# Patient Record
Sex: MALE | Race: WHITE | Employment: FULL TIME | URBAN - METROPOLITAN AREA
[De-identification: names, ages, dates, MRNs, and addresses within clinical notes are randomized per-mention and may not be internally consistent; named-entity substitution may affect disease eponyms.]

---

## 2018-08-20 ENCOUNTER — HOSPITAL ENCOUNTER (INPATIENT)
Age: 73
LOS: 1 days | Discharge: LEFT BEFORE DISCHARGE | DRG: 309 | End: 2018-08-21
Attending: EMERGENCY MEDICINE | Admitting: INTERNAL MEDICINE
Payer: MEDICARE

## 2018-08-20 ENCOUNTER — APPOINTMENT (OUTPATIENT)
Dept: GENERAL RADIOLOGY | Age: 73
DRG: 309 | End: 2018-08-20
Attending: EMERGENCY MEDICINE
Payer: MEDICARE

## 2018-08-20 DIAGNOSIS — I48.91 ATRIAL FIBRILLATION WITH RVR (HCC): Primary | ICD-10-CM

## 2018-08-20 LAB
ALBUMIN SERPL-MCNC: 3 G/DL (ref 3.5–5)
ALBUMIN/GLOB SERPL: 1 {RATIO} (ref 1.1–2.2)
ALP SERPL-CCNC: 61 U/L (ref 45–117)
ALT SERPL-CCNC: 39 U/L (ref 12–78)
ANION GAP SERPL CALC-SCNC: 6 MMOL/L (ref 5–15)
ANION GAP SERPL CALC-SCNC: 6 MMOL/L (ref 5–15)
AST SERPL-CCNC: 20 U/L (ref 15–37)
ATRIAL RATE: 147 BPM
BASOPHILS # BLD: 0 K/UL (ref 0–0.1)
BASOPHILS NFR BLD: 0 % (ref 0–1)
BILIRUB SERPL-MCNC: 1.4 MG/DL (ref 0.2–1)
BUN SERPL-MCNC: 16 MG/DL (ref 6–20)
BUN SERPL-MCNC: 16 MG/DL (ref 6–20)
BUN/CREAT SERPL: 14 (ref 12–20)
BUN/CREAT SERPL: 14 (ref 12–20)
CALCIUM SERPL-MCNC: 7.6 MG/DL (ref 8.5–10.1)
CALCIUM SERPL-MCNC: 7.9 MG/DL (ref 8.5–10.1)
CALCULATED R AXIS, ECG10: 0 DEGREES
CALCULATED T AXIS, ECG11: -173 DEGREES
CHLORIDE SERPL-SCNC: 102 MMOL/L (ref 97–108)
CHLORIDE SERPL-SCNC: 102 MMOL/L (ref 97–108)
CO2 SERPL-SCNC: 28 MMOL/L (ref 21–32)
CO2 SERPL-SCNC: 29 MMOL/L (ref 21–32)
CREAT SERPL-MCNC: 1.13 MG/DL (ref 0.7–1.3)
CREAT SERPL-MCNC: 1.13 MG/DL (ref 0.7–1.3)
DIAGNOSIS, 93000: NORMAL
DIFFERENTIAL METHOD BLD: ABNORMAL
EOSINOPHIL # BLD: 0.2 K/UL (ref 0–0.4)
EOSINOPHIL NFR BLD: 1 % (ref 0–7)
ERYTHROCYTE [DISTWIDTH] IN BLOOD BY AUTOMATED COUNT: 14.2 % (ref 11.5–14.5)
GLOBULIN SER CALC-MCNC: 3.1 G/DL (ref 2–4)
GLUCOSE SERPL-MCNC: 115 MG/DL (ref 65–100)
GLUCOSE SERPL-MCNC: 138 MG/DL (ref 65–100)
HCT VFR BLD AUTO: 38.2 % (ref 36.6–50.3)
HGB BLD-MCNC: 12.5 G/DL (ref 12.1–17)
IMM GRANULOCYTES # BLD: 0.1 K/UL (ref 0–0.04)
IMM GRANULOCYTES NFR BLD AUTO: 1 % (ref 0–0.5)
LACTATE SERPL-SCNC: 1.1 MMOL/L (ref 0.4–2)
LYMPHOCYTES # BLD: 0.8 K/UL (ref 0.8–3.5)
LYMPHOCYTES NFR BLD: 4 % (ref 12–49)
MAGNESIUM SERPL-MCNC: 1.9 MG/DL (ref 1.6–2.4)
MAGNESIUM SERPL-MCNC: 2 MG/DL (ref 1.6–2.4)
MCH RBC QN AUTO: 26.4 PG (ref 26–34)
MCHC RBC AUTO-ENTMCNC: 32.7 G/DL (ref 30–36.5)
MCV RBC AUTO: 80.8 FL (ref 80–99)
MONOCYTES # BLD: 1.7 K/UL (ref 0–1)
MONOCYTES NFR BLD: 9 % (ref 5–13)
NEUTS SEG # BLD: 16.6 K/UL (ref 1.8–8)
NEUTS SEG NFR BLD: 85 % (ref 32–75)
NRBC # BLD: 0 K/UL (ref 0–0.01)
NRBC BLD-RTO: 0 PER 100 WBC
P-R INTERVAL, ECG05: 158 MS
PLATELET # BLD AUTO: 122 K/UL (ref 150–400)
PMV BLD AUTO: 11.7 FL (ref 8.9–12.9)
POTASSIUM SERPL-SCNC: 3.7 MMOL/L (ref 3.5–5.1)
POTASSIUM SERPL-SCNC: 3.8 MMOL/L (ref 3.5–5.1)
PROT SERPL-MCNC: 6.1 G/DL (ref 6.4–8.2)
Q-T INTERVAL, ECG07: 260 MS
QRS DURATION, ECG06: 130 MS
QTC CALCULATION (BEZET), ECG08: 406 MS
RBC # BLD AUTO: 4.73 M/UL (ref 4.1–5.7)
SODIUM SERPL-SCNC: 136 MMOL/L (ref 136–145)
SODIUM SERPL-SCNC: 137 MMOL/L (ref 136–145)
TROPONIN I SERPL-MCNC: <0.05 NG/ML
TSH SERPL DL<=0.05 MIU/L-ACNC: 0.29 UIU/ML (ref 0.36–3.74)
VENTRICULAR RATE, ECG03: 147 BPM
WBC # BLD AUTO: 19.5 K/UL (ref 4.1–11.1)

## 2018-08-20 PROCEDURE — 74011000258 HC RX REV CODE- 258: Performed by: INTERNAL MEDICINE

## 2018-08-20 PROCEDURE — 96365 THER/PROPH/DIAG IV INF INIT: CPT

## 2018-08-20 PROCEDURE — 36415 COLL VENOUS BLD VENIPUNCTURE: CPT | Performed by: EMERGENCY MEDICINE

## 2018-08-20 PROCEDURE — 85025 COMPLETE CBC W/AUTO DIFF WBC: CPT | Performed by: EMERGENCY MEDICINE

## 2018-08-20 PROCEDURE — 83735 ASSAY OF MAGNESIUM: CPT | Performed by: EMERGENCY MEDICINE

## 2018-08-20 PROCEDURE — 94760 N-INVAS EAR/PLS OXIMETRY 1: CPT

## 2018-08-20 PROCEDURE — 80053 COMPREHEN METABOLIC PANEL: CPT | Performed by: EMERGENCY MEDICINE

## 2018-08-20 PROCEDURE — 84443 ASSAY THYROID STIM HORMONE: CPT | Performed by: EMERGENCY MEDICINE

## 2018-08-20 PROCEDURE — 87040 BLOOD CULTURE FOR BACTERIA: CPT | Performed by: EMERGENCY MEDICINE

## 2018-08-20 PROCEDURE — 74011250637 HC RX REV CODE- 250/637: Performed by: INTERNAL MEDICINE

## 2018-08-20 PROCEDURE — 74011250636 HC RX REV CODE- 250/636: Performed by: INTERNAL MEDICINE

## 2018-08-20 PROCEDURE — 84484 ASSAY OF TROPONIN QUANT: CPT | Performed by: INTERNAL MEDICINE

## 2018-08-20 PROCEDURE — 74011250636 HC RX REV CODE- 250/636: Performed by: EMERGENCY MEDICINE

## 2018-08-20 PROCEDURE — 93005 ELECTROCARDIOGRAM TRACING: CPT

## 2018-08-20 PROCEDURE — 83605 ASSAY OF LACTIC ACID: CPT | Performed by: EMERGENCY MEDICINE

## 2018-08-20 PROCEDURE — 74011000250 HC RX REV CODE- 250: Performed by: EMERGENCY MEDICINE

## 2018-08-20 PROCEDURE — 71045 X-RAY EXAM CHEST 1 VIEW: CPT

## 2018-08-20 PROCEDURE — 99285 EMERGENCY DEPT VISIT HI MDM: CPT

## 2018-08-20 PROCEDURE — 96375 TX/PRO/DX INJ NEW DRUG ADDON: CPT

## 2018-08-20 PROCEDURE — 96376 TX/PRO/DX INJ SAME DRUG ADON: CPT

## 2018-08-20 PROCEDURE — 65660000000 HC RM CCU STEPDOWN

## 2018-08-20 RX ORDER — AMOXICILLIN AND CLAVULANATE POTASSIUM 875; 125 MG/1; MG/1
1 TABLET, FILM COATED ORAL 2 TIMES DAILY WITH MEALS
Status: DISCONTINUED | OUTPATIENT
Start: 2018-08-20 | End: 2018-08-21 | Stop reason: HOSPADM

## 2018-08-20 RX ORDER — SODIUM CHLORIDE 0.9 % (FLUSH) 0.9 %
5-10 SYRINGE (ML) INJECTION EVERY 8 HOURS
Status: DISCONTINUED | OUTPATIENT
Start: 2018-08-20 | End: 2018-08-21 | Stop reason: HOSPADM

## 2018-08-20 RX ORDER — DILTIAZEM HYDROCHLORIDE 5 MG/ML
10 INJECTION INTRAVENOUS
Status: COMPLETED | OUTPATIENT
Start: 2018-08-20 | End: 2018-08-20

## 2018-08-20 RX ORDER — FUROSEMIDE 40 MG/1
40 TABLET ORAL DAILY
Status: DISCONTINUED | OUTPATIENT
Start: 2018-08-21 | End: 2018-08-21 | Stop reason: HOSPADM

## 2018-08-20 RX ORDER — ACETAMINOPHEN 325 MG/1
650 TABLET ORAL
Status: DISCONTINUED | OUTPATIENT
Start: 2018-08-20 | End: 2018-08-21 | Stop reason: HOSPADM

## 2018-08-20 RX ORDER — ONDANSETRON 2 MG/ML
4 INJECTION INTRAMUSCULAR; INTRAVENOUS
Status: DISCONTINUED | OUTPATIENT
Start: 2018-08-20 | End: 2018-08-21 | Stop reason: HOSPADM

## 2018-08-20 RX ORDER — ENOXAPARIN SODIUM 100 MG/ML
1 INJECTION SUBCUTANEOUS EVERY 12 HOURS
Status: DISCONTINUED | OUTPATIENT
Start: 2018-08-21 | End: 2018-08-21

## 2018-08-20 RX ORDER — CARVEDILOL 25 MG/1
25 TABLET ORAL
COMMUNITY
End: 2018-08-21

## 2018-08-20 RX ORDER — GUAIFENESIN 600 MG/1
600 TABLET, EXTENDED RELEASE ORAL
Status: DISCONTINUED | OUTPATIENT
Start: 2018-08-20 | End: 2018-08-21 | Stop reason: HOSPADM

## 2018-08-20 RX ORDER — PANTOPRAZOLE SODIUM 40 MG/1
40 TABLET, DELAYED RELEASE ORAL
Status: DISCONTINUED | OUTPATIENT
Start: 2018-08-21 | End: 2018-08-21 | Stop reason: HOSPADM

## 2018-08-20 RX ORDER — PANTOPRAZOLE SODIUM 40 MG/1
40 TABLET, DELAYED RELEASE ORAL DAILY
COMMUNITY

## 2018-08-20 RX ORDER — CARVEDILOL 12.5 MG/1
12.5 TABLET ORAL 2 TIMES DAILY WITH MEALS
Status: DISCONTINUED | OUTPATIENT
Start: 2018-08-20 | End: 2018-08-21

## 2018-08-20 RX ORDER — BENZONATATE 100 MG/1
100 CAPSULE ORAL
Status: DISCONTINUED | OUTPATIENT
Start: 2018-08-20 | End: 2018-08-21 | Stop reason: HOSPADM

## 2018-08-20 RX ORDER — ENOXAPARIN SODIUM 100 MG/ML
1 INJECTION SUBCUTANEOUS
Status: COMPLETED | OUTPATIENT
Start: 2018-08-20 | End: 2018-08-20

## 2018-08-20 RX ORDER — SODIUM CHLORIDE 0.9 % (FLUSH) 0.9 %
5-10 SYRINGE (ML) INJECTION AS NEEDED
Status: DISCONTINUED | OUTPATIENT
Start: 2018-08-20 | End: 2018-08-21 | Stop reason: HOSPADM

## 2018-08-20 RX ORDER — ATORVASTATIN CALCIUM 20 MG/1
20 TABLET, FILM COATED ORAL DAILY
Status: DISCONTINUED | OUTPATIENT
Start: 2018-08-21 | End: 2018-08-21 | Stop reason: HOSPADM

## 2018-08-20 RX ORDER — GUAIFENESIN 600 MG/1
600 TABLET, EXTENDED RELEASE ORAL
COMMUNITY
End: 2020-01-01

## 2018-08-20 RX ORDER — ASPIRIN 81 MG/1
81 TABLET ORAL
Status: DISCONTINUED | OUTPATIENT
Start: 2018-08-22 | End: 2018-08-21 | Stop reason: HOSPADM

## 2018-08-20 RX ADMIN — AMIODARONE HYDROCHLORIDE 150 MG: 50 INJECTION, SOLUTION INTRAVENOUS at 22:51

## 2018-08-20 RX ADMIN — Medication 10 ML: at 23:11

## 2018-08-20 RX ADMIN — AMIODARONE HYDROCHLORIDE 150 MG: 50 INJECTION, SOLUTION INTRAVENOUS at 19:41

## 2018-08-20 RX ADMIN — ENOXAPARIN SODIUM 100 MG: 100 INJECTION SUBCUTANEOUS at 17:41

## 2018-08-20 RX ADMIN — Medication 10 ML: at 18:16

## 2018-08-20 RX ADMIN — AMIODARONE HYDROCHLORIDE 150 MG: 50 INJECTION, SOLUTION INTRAVENOUS at 16:41

## 2018-08-20 RX ADMIN — GUAIFENESIN 600 MG: 600 TABLET, EXTENDED RELEASE ORAL at 20:01

## 2018-08-20 RX ADMIN — BENZONATATE 100 MG: 100 CAPSULE ORAL at 20:01

## 2018-08-20 RX ADMIN — DILTIAZEM HYDROCHLORIDE 10 MG: 5 INJECTION, SOLUTION INTRAVENOUS at 13:27

## 2018-08-20 RX ADMIN — AMIODARONE HYDROCHLORIDE 1 MG/MIN: 50 INJECTION, SOLUTION INTRAVENOUS at 17:04

## 2018-08-20 RX ADMIN — CARVEDILOL 12.5 MG: 12.5 TABLET, FILM COATED ORAL at 18:16

## 2018-08-20 RX ADMIN — CALCIUM GLUCONATE 2 G: 94 INJECTION, SOLUTION INTRAVENOUS at 18:16

## 2018-08-20 RX ADMIN — AMOXICILLIN AND CLAVULANATE POTASSIUM 1 TABLET: 875; 125 TABLET, FILM COATED ORAL at 18:16

## 2018-08-20 RX ADMIN — DILTIAZEM HYDROCHLORIDE 10 MG: 5 INJECTION INTRAVENOUS at 14:12

## 2018-08-20 NOTE — ED NOTES
TRANSFER - OUT REPORT:    Verbal report given to MATTHEW Huff(name) on Charly Urias  being transferred to 2214(unit) for routine progression of care       Report consisted of patients Situation, Background, Assessment and   Recommendations(SBAR). Information from the following report(s) SBAR, Kardex, ED Summary, MAR, Recent Results and Cardiac Rhythm sinus tach, when slowed down A-fib was reviewed with the receiving nurse. Lines:   Peripheral IV 08/20/18 Left Antecubital (Active)   Site Assessment Clean, dry, & intact 8/20/2018  1:18 PM   Phlebitis Assessment 0 8/20/2018  1:18 PM   Infiltration Assessment 0 8/20/2018  1:18 PM   Dressing Status Clean, dry, & intact 8/20/2018  1:18 PM        Opportunity for questions and clarification was provided. Patient transported with:   Monitor  Registered Nurse  Tech       **MATTHEW Huff, upstairs to start rocephin once 2nd IV is started in the ED.

## 2018-08-20 NOTE — PROGRESS NOTES
TRANSFER - IN REPORT:    Verbal report received from Justo garcía RN(name) on Eva Slice  being received from ED(unit) for routine progression of care      Report consisted of patients Situation, Background, Assessment and   Recommendations(SBAR). Information from the following report(s) SBAR, Kardex, MAR, Recent Results and Cardiac Rhythm (AFib) was reviewed with the receiving nurse. Opportunity for questions and clarification was provided. Assessment completed upon patients arrival to unit and care assumed.        Primary Nurse Hernando Batista and MATTHEW Burt performed a dual skin assessment on this patient skin intact patient with rash like area to [facility] where patient believes BP cuff irritated his skin  Amilcar score is 22    Visit Vitals    /89 (BP 1 Location: Right arm, BP Patient Position: Sitting;Post activity)    Pulse (!) 141    Temp 99.4 °F (37.4 °C)    Resp 19    Ht 5' 9\" (1.753 m)    Wt 104.2 kg (229 lb 11.5 oz)    SpO2 97%    BMI 33.92 kg/m2

## 2018-08-20 NOTE — ED NOTES
Pt HR remains elevated 140's at this time    Spoke with Dr Jimmie Maldonado who is consulting Cardiology for further plan of care.

## 2018-08-20 NOTE — PROGRESS NOTES
Problem: Falls - Risk of  Goal: *Absence of Falls  Document Geni Fall Risk and appropriate interventions in the flowsheet.    Outcome: Progressing Towards Goal  Fall Risk Interventions:            Medication Interventions: Assess postural VS orthostatic hypotension, Evaluate medications/consider consulting pharmacy, Patient to call before getting OOB, Teach patient to arise slowly  No slip socks with OOB, Call bell in reach

## 2018-08-20 NOTE — IP AVS SNAPSHOT
[address] 
[phone] Patient: Ez Marquis MRN: [id number] :1945 About your hospitalization You were admitted on:  2018 You last received care in the:  [facility] 2 CARDIOPULMONARY CARE You were discharged on:  2018 Why you were hospitalized Your primary diagnosis was:  Not on File Your diagnoses also included:  Atrial Fibrillation With Rvr (Hcc) Follow-up Information Follow up With Details Comments Contact Info Berenice Lindo MD On 2018 Cardiology - hospital follow up. Will be seen by Murali Hodges NP.  at 10:30am - Please arrive 15 minutes early. [employer] cards and photo ID [id number] [phone] Rodrigue Corrales MD In 2 weeks PCP - patient will schedule his own appointment. To be seen within 2 weeks of discharge. Ricki Armstrong [address] [phone] Discharge Orders None A check alissa indicates which time of day the medication should be taken. My Medications START taking these medications Instructions Each Dose to Equal  
 Morning Noon Evening Bedtime * amiodarone 200 mg tablet Commonly known as:  CORDARONE Your last dose was: Your next dose is: Take 1 Tab by mouth three (3) times daily for 10 days. 200 mg  
    
   
   
   
  
 * amiodarone 200 mg tablet Commonly known as:  CORDARONE Start taking on:  2018 Your last dose was: Your next dose is: Take 1 Tab by mouth two (2) times a day. 200 mg  
    
   
   
   
  
 amoxicillin-clavulanate 875-125 mg per tablet Commonly known as:  AUGMENTIN Your last dose was: Your next dose is: Take 1 Tab by mouth two (2) times daily (with meals) for 4 days. 1 Tab apixaban 5 mg tablet Commonly known as:  Tracie Soto Your last dose was: Your next dose is: Take 1 Tab by mouth two (2) times a day. 5 mg * Notice: This list has 2 medication(s) that are the same as other medications prescribed for you. Read the directions carefully, and ask your doctor or other care provider to review them with you. CHANGE how you take these medications Instructions Each Dose to Equal  
 Morning Noon Evening Bedtime  
 carvedilol 25 mg tablet Commonly known as:  Norbert Roth What changed:   
- when to take this - Another medication with the same name was removed. Continue taking this medication, and follow the directions you see here. Your last dose was: Your next dose is: Take 1 Tab by mouth two (2) times daily (with meals). 25 mg  
    
   
   
   
  
 pantoprazole 40 mg tablet Commonly known as:  PROTONIX What changed:  Another medication with the same name was removed. Continue taking this medication, and follow the directions you see here. Your last dose was: Your next dose is: Take 40 mg by mouth daily. 40 mg CONTINUE taking these medications Instructions Each Dose to Equal  
 Morning Noon Evening Bedtime  
 furosemide 40 mg tablet Commonly known as:  LASIX Your last dose was: Your next dose is: Take 1 Tab by mouth daily. 40 mg  
    
   
   
   
  
 MUCINEX 600 mg ER tablet Generic drug:  guaiFENesin ER Your last dose was: Your next dose is: Take 600 mg by mouth two (2) times daily as needed for Congestion. 600 mg  
    
   
   
   
  
 simvastatin 40 mg tablet Commonly known as:  ZOCOR Your last dose was: Your next dose is: TAKE 1 TABLET BY MOUTH NIGHTLY  
     
   
   
   
  
  
STOP taking these medications   
 aspirin 81 mg tablet ibuprofen 800 mg tablet Commonly known as:  MOTRIN Where to Get Your Medications These medications were sent to [place], [address]  [address] [facility], [address] [place] [id number] Hours:  24-hours Phone:  [phone]  
  amiodarone 200 mg tablet  
 amiodarone 200 mg tablet  
 amoxicillin-clavulanate 875-125 mg per tablet  
 apixaban 5 mg tablet  
 carvedilol 25 mg tablet Discharge Instructions Apixaban (By mouth) Apixaban (a-PIX-a-ban) Treats and prevents blood clots. This medicine is a blood thinner. Brand Name(s): Eliquis There may be other brand names for this medicine. When This Medicine Should Not Be Used: This medicine is not right for everyone. Do not use it if you had an allergic reaction to apixaban or you have active bleeding. How to Use This Medicine:  
Tablet · Your doctor will tell you how much medicine to use. Do not use more than directed. · If you are not able to swallow the tablets whole, they may be crushed and mixed in water, 5% dextrose in water (D5W), apple juice, or applesauce. The crushed tablets may be mixed with 60 mL of water or D5W dose and given through a nasogastric tube (NGT). · This medicine should come with a Medication Guide. Ask your pharmacist for a copy if you do not have one. · Missed dose: Take a dose as soon as you remember. If it is almost time for your next dose, wait until then and take a regular dose. Do not take extra medicine to make up for a missed dose. · Store the medicine in a closed container at room temperature, away from heat, moisture, and direct light. Drugs and Foods to Avoid: Ask your doctor or pharmacist before using any other medicine, including over-the-counter medicines, vitamins, and herbal products. · Some medicines can affect how apixaban works. Tell your doctor if you are using any of the following: ¨ Carbamazepine, clarithromycin, itraconazole, ketoconazole, phenytoin, rifampin, ritonavir, Delgado's wort ¨ Blood thinner (including clopidogrel, heparin, prasugrel, warfarin) ¨ Medicine to treat depression ¨ NSAID pain or arthritis medicine (including aspirin, celecoxib, diclofenac, ibuprofen, naproxen) Warnings While Using This Medicine: · Tell your doctor if you are pregnant or breastfeeding, or if you have kidney disease, liver disease, bleeding problems, or an artificial heart valve. · Do not stop using this medicine suddenly without asking your doctor. You might have a higher risk of stroke for a short time after you stop using this medicine. · This medicine increases your risk for bleeding that can become serious if not controlled. You may also bruise easily, and it may take longer than usual for bleeding to stop. · This medicine may increase your risk for blood clots in your spine or back if you undergo an epidural or spinal puncture. This could lead to paralysis. Tell your doctor if you ever had spine problems or back surgery. · Tell any doctor or dentist who treats you that you are using this medicine. With your doctor's supervision, you may need to stop using this medicine several days before you have surgery or medical tests. · Your doctor will do lab tests at regular visits to check on the effects of this medicine. Keep all appointments. · Keep all medicine out of the reach of children. Never share your medicine with anyone. Possible Side Effects While Using This Medicine:  
Call your doctor right away if you notice any of these side effects: · Allergic reaction: Itching or hives, swelling in your face or hands, swelling or tingling in your mouth or throat, chest tightness, trouble breathing · Change in how much or how often you urinate, red or pink urine · Chest pain, trouble breathing · Coughing up blood, vomiting blood or material that looks like coffee grounds · Numbness, tingling, or muscle weakness in your legs or feet · Red or black, tarry stools · Unusual bleeding, bruising, or weakness If you notice other side effects that you think are caused by this medicine, tell your doctor. Call your doctor for medical advice about side effects. You may report side effects to FDA at 2-889-PVR-3796 © 2017 [address] Information is for End User's use only and may not be sold, redistributed or otherwise used for commercial purposes. The above information is an  only. It is not intended as medical advice for individual conditions or treatments. Talk to your doctor, nurse or pharmacist before following any medical regimen to see if it is safe and effective for you. Peerio Announcement We are excited to announce that we are making your provider's discharge notes available to you in Peerio. You will see these notes when they are completed and signed by the physician that discharged you from your recent hospital stay. If you have any questions or concerns about any information you see in Peerio, please call the Health Information Department where you were seen or reach out to your Primary Care Provider for more information about your plan of care. Introducing [facility]! [facility] introduces Peerio patient portal. Now you can access parts of your medical record, email your doctor's office, and request medication refills online. 1. In your internet browser, go to [email or web address] 2. Click on the First Time User? Click Here link in the Sign In box. You will see the New Member Sign Up page. 3. Enter your Peerio Access Code exactly as it appears below. You will not need to use this code after youve completed the sign-up process. If you do not sign up before the expiration date, you must request a new code. · Peerio Access Code: [email or web address] Expires: 11/18/2018  1:05 PM 
 
 4. Enter the last four digits of your Social Security Number (xxxx) and Date of Birth (mm/dd/yyyy) as indicated and click Submit. You will be taken to the next sign-up page. 5. Create a [employer] ID. This will be your [employer] login ID and cannot be changed, so think of one that is secure and easy to remember. 6. Create a [employer] password. You can change your password at any time. 7. Enter your Password Reset Question and Answer. This can be used at a later time if you forget your password. 8. Enter your e-mail address. You will receive e-mail notification when new information is available in [address] 9. Click Sign Up. You can now view and download portions of your medical record. 10. Click the Download Summary menu link to download a portable copy of your medical information. If you have questions, please visit the Frequently Asked Questions section of the [employer] website. Remember, [employer] is NOT to be used for urgent needs. For medical emergencies, dial 911. Now available from your iPhone and Android! Introducing Junaid Gonzalez As a Malgorzata Cedeño patient, I wanted to make you aware of our electronic visit tool called Junaid CelayaNevigo. Malgorzata Cedeño 24/7 allows you to connect within minutes with a medical provider 24 hours a day, seven days a week via a mobile device or tablet or logging into a secure website from your computer. You can access Junaid Carlos from anywhere in the [place]. A virtual visit might be right for you when you have a simple condition and feel like you just dont want to get out of bed, or cant get away from work for an appointment, when your regular Malgorzata Cedeño provider is not available (evenings, weekends or holidays), or when youre out of town and need minor care.   Electronic visits cost only $49 and if the Junaid Gonzalez provider determines a prescription is needed to treat your condition, one can be electronically transmitted to a nearby pharmacy*. Please take a moment to enroll today if you have not already done so. The enrollment process is free and takes just a few minutes. To enroll, please download the [employer] 24/7 christiano to your tablet or phone, or visit [email or web address] to enroll on your computer. And, as an [address] [place] [address] patient with a [employer] account, the results of your visits will be scanned into your electronic medical record and your primary care provider will be able to view the scanned results. We urge you to continue to see your regular Angela MckeonAdomos provider for your ongoing medical care. And while your primary care provider may not be the one available when you seek a [employer] virtual visit, the peace of mind you get from getting a real diagnosis real time can be priceless. For more information on [employer], view our Frequently Asked Questions (FAQs) at [email or web address]. Sincerely, 
 
Hafsa Jones MD 
Chief Medical Officer [address] [place] [address] *:  certain medications cannot be prescribed via [employer] Unresulted Labs-Please follow up with your PCP about these lab tests Order Current Status CULTURE, BLOOD Preliminary result CULTURE, BLOOD Preliminary result Providers Seen During Your Hospitalization Provider Specialty Primary office phone Frutoso Abdullahi Almaraz MD Emergency Medicine [phone] Mignon Leahy MD Internal Medicine [phone] Your Primary Care Physician (PCP) Primary Care Physician Office Phone Office Fax [place] [facility] [phone] You are allergic to the following No active allergies Recent Documentation Height Weight BMI Smoking Status 1.753 m 104.2 kg 33.92 kg/m2 Former Smoker Emergency Contacts Name Discharge Info Relation Home Work Mobile [address] CAREGIVER [3] Spouse [3] [phone] Patient Belongings The following personal items are in your possession at time of discharge: 
  Dental Appliances: Lowers, Uppers, With patient  Visual Aid: Glasses, With patient      Home Medications: None   Jewelry: Necklace, Watch, With patient  Clothing: Pants, Shirt, Hat, Undergarments, With patient    Other Valuables: Eyeglasses, Cell Phone, Other (comment) (bluetooth headset) Please provide this summary of care documentation to your next provider. Signatures-by signing, you are acknowledging that this After Visit Summary has been reviewed with you and you have received a copy. Patient Signature:  ____________________________________________________________ Date:  ____________________________________________________________  
  
[facility] Provider Signature:  ____________________________________________________________ Date:  ____________________________________________________________

## 2018-08-20 NOTE — PROGRESS NOTES
08/20/18 1757   Vital Signs   Temp 99.4 °F (37.4 °C)   Temp Source Oral   Pulse (Heart Rate) (!) 141   Heart Rate Source Monitor   Resp Rate 19   O2 Sat (%) 97 %   Level of Consciousness Alert   /89   MAP (Monitor) 101   MAP (Calculated) 104   BP 1 Method Automatic   BP 1 Location Right arm   BP Patient Position Sitting;Post activity   MEWS Score 4     Patient continues on amio gtt @ 1mg/min.   Scheduled coreg given  \[facility]\"" [address] Silvano Medina paged due to patients HR maintaining in 140s

## 2018-08-20 NOTE — ED NOTES
Pt arrives accompanied by family for elevated HR. Pt states \"just give me the needle and the medicine so I can go home;\" Pt states he went to PCP for \"the medicine\" but states he was sent to the ED as his PCP was unable to give him the medicine. 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

## 2018-08-20 NOTE — PROGRESS NOTES
1842: Paged Dr. Jamaica Covington RE: HR sustaining in 140s, spoke with [facility].

## 2018-08-20 NOTE — PROGRESS NOTES
1915-Bedside shift change report given to MATTHEW Escobar (oncoming nurse) by Xiomara Mckinnon (offgoing nurse). Report included the following information SBAR and Kardex.

## 2018-08-20 NOTE — CONSULTS
Pt seen and examined. Full consult dictated    Alutter with :1 block and probable COPD exac    Need admit, rate control, echo, serial enzymes.     Would start IV diltiazem, Lovenox

## 2018-08-20 NOTE — H&P
Hospitalist Admission Note    NAME: Ramakrishna Parish   :  1945   MRN:  [id number]     Date/Time:  2018 5:32 PM    Patient PCP: Petra Sims NP  ______________________________________________________________________  Given the patient's current clinical presentation, I have a high level of concern for decompensation if discharged from the emergency department. Complex decision making was performed, which includes reviewing the patient's available past medical records, laboratory results, and x-ray films. My assessment of this patient's clinical condition and my plan of care is as follows. Assessment / Plan:  Atrial Fibrillation with RVR  -admit to telemetry, Inpatient  -appreciate Cardiology evaluation  -agree with amiodarone gtt  -continue sq Lovenox (treatment dose), started by Cardiology  -TTE  -patient states that he is leaving tomorrow, I informed him that if he converts to NSR and every thing looks okay that he may be discharged but I suspected taht he would at least be here a couple of days    Low TSH:  -check Free T4    CAD with CABG 10 years ago  Hyperlipidemia  Hypertension  -continue ASA and Coreg, Lasix as well as home statin    Acute Bronchitis  Leukocytosis: ? Related to infection or recent prednisone use for   SIRS, unclear if related to infection; CXR clear, no wheezing  -start Augmentin  -continue mucinex  -prn tessalon perles    Hypocalcemia: Calcium corrects to 8.4  -give IV calcium and monitor      Code Status: Full  Surrogate Decision Maker: Wife    DVT Prophylaxis: on treatment dose Lovenox  GI Prophylaxis: on home PPI    Baseline: Functional, still works      Subjective:   CHIEF COMPLAINT: fatigue    HISTORY OF PRESENT ILLNESS:     Ramakrishna Parish is a 67 y.o.  male who presents with fatigue.  Patient states that this may have been present for a couple of days but reports that today at work was bad enough to where after 2 hours he decided to come to the ED. Patient was dfound to be in afib with RVR on presentation. Patient does not have a history of afib. He reports recent \"chest congestion\" and has been coughing up yellow mucus. He denies any wheezing or SOB. He denies lightheadedness/dizziness, CP, palpitations or nausea/vomiting. He deneis fevers. Patient reports having recently stopped prednisone because it made him feel \"wired. \" He was Rx'd this for treatment of poison ivy. In the ED patient was started on an Amiodarone gtt and treatment dose Lovenox by Cardiology. HR persistently in 140's at rest at this time. We were asked to admit for work up and evaluation of the above problems. Past Medical History:   Diagnosis Date    Aortal valvular stenosis     CAD (coronary artery disease)     COPD     Hypercholesterolemia     Hypertension     PAD (peripheral artery disease) (HCC)         Past Surgical History:   Procedure Laterality Date    CABG, ARTERY-VEIN, FOUR      HX AORTIC VALVE REPLACEMENT  10/3/07    mosaic ultra porcine medtronic [id number]       Social History   Substance Use Topics    Smoking status: Former Smoker     Quit date: 9/30/2007    Smokeless tobacco: Never Used    Alcohol use No      Family History: Patient reports that his mother had DM and \"heart problems\"    No Known Allergies     Prior to Admission medications    Medication Sig Start Date End Date Taking? Authorizing Provider   carvedilol (COREG) 25 mg tablet Take 25 mg by mouth nightly. Yes Abhi Sauceda MD   pantoprazole (PROTONIX) 40 mg tablet Take 40 mg by mouth daily. Yes Abhi Sauceda MD   guaiFENesin ER (MUCINEX) 600 mg ER tablet Take 600 mg by mouth two (2) times daily as needed for Congestion. Yes Abhi Sauceda MD   furosemide (LASIX) 40 mg tablet Take 1 Tab by mouth daily.  3/5/16  Yes Vamsi Stephens NP   simvastatin (ZOCOR) 40 mg tablet TAKE 1 TABLET BY MOUTH NIGHTLY 2/23/16  Yes Nhi Nicolas MD   ibuprofen (MOTRIN) 800 mg tablet TAKE 1 TABLET BY MOUTH EVERY 8 HOURS AS NEEDED FOR PAIN 2/1/16  Yes Marycarmen Suarez NP   aspirin 81 mg Tab Take 81 mg by mouth three (3) days a week. Monday, Wednesday and Friday   Yes Historical Provider       REVIEW OF SYSTEMS:     Total of 12 systems reviewed as follows:       POSITIVE= underlined text  Negative = text not underlined  General:  fever, chills, sweats, generalized weakness, weight loss/gain,      loss of appetite   Eyes:    blurred vision, eye pain, loss of vision, double vision  ENT:    rhinorrhea, pharyngitis   Respiratory:   cough, sputum production, SOB, CUEVA, wheezing, pleuritic pain   Cardiology:   chest pain, palpitations, orthopnea, PND, edema, syncope   Gastrointestinal:  abdominal pain , N/V, diarrhea, dysphagia, constipation, bleeding   Genitourinary:  frequency, urgency, dysuria, hematuria, incontinence   Muskuloskeletal :  arthralgia, myalgia, back pain  Hematology:  easy bruising, nose or gum bleeding, lymphadenopathy   Dermatological: rash, ulceration, pruritis, color change / jaundice  Endocrine:   hot flashes or polydipsia   Neurological:  headache, dizziness, confusion, focal weakness, paresthesia,     Speech difficulties, memory loss, gait difficulty  Psychological: Feelings of anxiety, depression, agitation    Objective:   VITALS:    Visit Vitals    /90    Pulse (!) 148    Temp 98.3 °F (36.8 °C)    Resp 20    Ht 5' 9\" (1.753 m)    Wt 104.2 kg (229 lb 11.5 oz)    SpO2 95%    BMI 33.92 kg/m2       PHYSICAL EXAM:    General:    Alert, cooperative, no distress, appears stated age. HEENT: Atraumatic, anicteric sclerae, pink conjunctivae     No oral ulcers, mucosa moist, throat clear, dentition fair  Neck:  Supple, symmetrical,  Thyroid not enlarged  Lungs:   Clear to auscultation bilaterally. No Wheezing or Rhonchi. No rales. Chest wall:  No tenderness  No Accessory muscle use.   Heart:   Irregular  Rhythm with tachycardia,  No  murmur   No edema  Abdomen:   Soft, non-tender. Not distended. Bowel sounds normal  Extremities: No cyanosis. No clubbing,      Skin turgor normal, Capillary refill normal  Skin:     Not pale. Not Jaundiced  No rashes   Psych:  Good insight. Not depressed. Not anxious or agitated. Neurologic: EOMs intact. No facial asymmetry. No aphasia or slurred speech. Symmetrical strength, Sensation grossly intact. Alert and oriented X 4.     _______________________________________________________________________  Care Plan discussed with:    Comments   Patient x    Family      RN     Care Manager                    Consultant:      _______________________________________________________________________  Expected  Disposition:   Home with Family x   HH/PT/OT/RN    SNF/LTC    [facility]    ________________________________________________________________________  TOTAL TIME:  61 Minutes    Critical Care Provided     Minutes non procedure based      Comments    x Reviewed previous records   >50% of visit spent in counseling and coordination of care x Discussion with patient and/or family and questions answered       ________________________________________________________________________  Signed: Virginia Martinez MD    Procedures: see electronic medical records for all procedures/Xrays and details which were not copied into this note but were reviewed prior to creation of Plan. LAB DATA REVIEWED:    Recent Results (from the past 24 hour(s))   EKG, 12 LEAD, INITIAL    Collection Time: 08/20/18  1:03 PM   Result Value Ref Range    Ventricular Rate 147 BPM    Atrial Rate 147 BPM    P-R Interval 158 ms    QRS Duration 130 ms    Q-T Interval 260 ms    QTC Calculation (Bezet) 406 ms    Calculated R Axis 0 degrees    Calculated T Axis -173 degrees    Diagnosis       Probable aflutter with 2 to 1 block   Left ventricular hypertrophy with QRS widening and repolarization abnormality  When compared with ECG of 29-SEP-2007 04:59,  Vent.  rate has increased BY  79 BPM  QRS duration has increased  Minimal criteria for Septal infarct are no longer present  ST more depressed in Inferior leads  ST now depressed in Lateral leads  T wave inversion less evident in Anterior leads  Confirmed by Pancho Khanna ([id number]) on 8/20/2018 8:41:99 PM     METABOLIC PANEL, BASIC    Collection Time: 08/20/18  1:27 PM   Result Value Ref Range    Sodium 136 136 - 145 mmol/L    Potassium 3.8 3.5 - 5.1 mmol/L    Chloride 102 97 - 108 mmol/L    CO2 28 21 - 32 mmol/L    Anion gap 6 5 - 15 mmol/L    Glucose 138 (H) 65 - 100 mg/dL    BUN 16 6 - 20 MG/DL    Creatinine 1.13 0.70 - 1.30 MG/DL    BUN/Creatinine ratio 14 12 - 20      GFR est AA >60 >60 ml/min/1.73m2    GFR est non-AA >60 >60 ml/min/1.73m2    Calcium 7.9 (L) 8.5 - 10.1 MG/DL   MAGNESIUM    Collection Time: 08/20/18  1:27 PM   Result Value Ref Range    Magnesium 1.9 1.6 - 2.4 mg/dL   LACTIC ACID    Collection Time: 08/20/18  1:27 PM   Result Value Ref Range    Lactic acid 1.1 0.4 - 2.0 MMOL/L   TSH 3RD GENERATION    Collection Time: 08/20/18  1:27 PM   Result Value Ref Range    TSH 0.29 (L) 0.36 - 3.74 uIU/mL   CBC WITH AUTOMATED DIFF    Collection Time: 08/20/18  2:10 PM   Result Value Ref Range    WBC 19.5 (H) 4.1 - 11.1 K/uL    RBC 4.73 4.10 - 5.70 M/uL    HGB 12.5 12.1 - 17.0 g/dL    HCT 38.2 36.6 - 50.3 %    MCV 80.8 80.0 - 99.0 FL    MCH 26.4 26.0 - 34.0 PG    MCHC 32.7 30.0 - 36.5 g/dL    RDW 14.2 11.5 - 14.5 %    PLATELET 946 (L) 516 - 400 K/uL    MPV 11.7 8.9 - 12.9 FL    NRBC 0.0 0  WBC    ABSOLUTE NRBC 0.00 0.00 - 0.01 K/uL    NEUTROPHILS 85 (H) 32 - 75 %    LYMPHOCYTES 4 (L) 12 - 49 %    MONOCYTES 9 5 - 13 %    EOSINOPHILS 1 0 - 7 %    BASOPHILS 0 0 - 1 %    IMMATURE GRANULOCYTES 1 (H) 0.0 - 0.5 %    ABS. NEUTROPHILS 16.6 (H) 1.8 - 8.0 K/UL    ABS. LYMPHOCYTES 0.8 0.8 - 3.5 K/UL    ABS. MONOCYTES 1.7 (H) 0.0 - 1.0 K/UL    ABS. EOSINOPHILS 0.2 0.0 - 0.4 K/UL    ABS. BASOPHILS 0.0 0.0 - 0.1 K/UL    ABS. IMM.  GRANS. 0.1 (H) 0.00 - 0.04 K/UL    DF AUTOMATED     METABOLIC PANEL, COMPREHENSIVE    Collection Time: 08/20/18  2:10 PM   Result Value Ref Range    Sodium 137 136 - 145 mmol/L    Potassium 3.7 3.5 - 5.1 mmol/L    Chloride 102 97 - 108 mmol/L    CO2 29 21 - 32 mmol/L    Anion gap 6 5 - 15 mmol/L    Glucose 115 (H) 65 - 100 mg/dL    BUN 16 6 - 20 MG/DL    Creatinine 1.13 0.70 - 1.30 MG/DL    BUN/Creatinine ratio 14 12 - 20      GFR est AA >60 >60 ml/min/1.73m2    GFR est non-AA >60 >60 ml/min/1.73m2    Calcium 7.6 (L) 8.5 - 10.1 MG/DL    Bilirubin, total 1.4 (H) 0.2 - 1.0 MG/DL    ALT (SGPT) 39 12 - 78 U/L    AST (SGOT) 20 15 - 37 U/L    Alk.  phosphatase 61 45 - 117 U/L    Protein, total 6.1 (L) 6.4 - 8.2 g/dL    Albumin 3.0 (L) 3.5 - 5.0 g/dL    Globulin 3.1 2.0 - 4.0 g/dL    A-G Ratio 1.0 (L) 1.1 - 2.2     MAGNESIUM    Collection Time: 08/20/18  2:10 PM   Result Value Ref Range    Magnesium 2.0 1.6 - 2.4 mg/dL   TROPONIN I    Collection Time: 08/20/18  4:16 PM   Result Value Ref Range    Troponin-I, Qt. <0.05 <0.05 ng/mL

## 2018-08-20 NOTE — ED PROVIDER NOTES
EMERGENCY DEPARTMENT HISTORY AND PHYSICAL EXAM      Date: 8/20/2018  Patient Name: Sofia Graham    History of Presenting Illness     Chief Complaint   Patient presents with    Palpitations     sent by PCP for Afib with RVR; onset three days ago       History Provided By: Patient    HPI: Sofia Graham, 67 y.o. male with PMHx significant for hypercholesterolemia, HTN, CAD, COPD, PAD, presents via wheelchair to the ED for further evaluation of new onset heart racing palpitations over the last 3 days. Pt reports associated sx of a dry cough and a transient 100.0F fever yesterday. He expresses over the last 3 days he has had intermittent episodes of heart racing palpitations leading him to his PCP's office for evaluation. Upon arrival pt was found to be in A-fib with RVR and was referred to the ED. Pt discloses a h/o a 5 way bypass ~11 years ago but denies any previous h/o A-fib. Pt denies any exacerbating or alleviating factors to his sx. He denies any chills, chest pain, SOB, abdominal pain, nausea, vomiting, diarrhea, or dysuria. There are no other complaints, changes, or physical findings at this time. PCP: Quin Redmond MD    Current Outpatient Prescriptions   Medication Sig Dispense Refill    apixaban (ELIQUIS) 5 mg tablet Take 1 Tab by mouth two (2) times a day. 60 Tab 0    amoxicillin-clavulanate (AUGMENTIN) 875-125 mg per tablet Take 1 Tab by mouth two (2) times daily (with meals) for 4 days. 8 Tab 0    amiodarone (CORDARONE) 200 mg tablet Take 1 Tab by mouth three (3) times daily for 10 days. 30 Tab 0    [START ON 9/1/2018] amiodarone (CORDARONE) 200 mg tablet Take 1 Tab by mouth two (2) times a day. 30 Tab 0    carvedilol (COREG) 25 mg tablet Take 1 Tab by mouth two (2) times daily (with meals). 60 Tab 0    pantoprazole (PROTONIX) 40 mg tablet Take 40 mg by mouth daily.  guaiFENesin ER (MUCINEX) 600 mg ER tablet Take 600 mg by mouth two (2) times daily as needed for Congestion.       furosemide (LASIX) 40 mg tablet Take 1 Tab by mouth daily. 90 Tab 0    simvastatin (ZOCOR) 40 mg tablet TAKE 1 TABLET BY MOUTH NIGHTLY 90 Tab 0       Past History     Past Medical History:  Past Medical History:   Diagnosis Date    Aortal valvular stenosis     CAD (coronary artery disease)     COPD     Hypercholesterolemia     Hypertension     PAD (peripheral artery disease) (HCC)        Past Surgical History:  Past Surgical History:   Procedure Laterality Date    CABG, ARTERY-VEIN, FOUR      HX AORTIC VALVE REPLACEMENT  10/3/07    mosaic ultra porcine medtronic [id number]       Family History:  No family history on file. Social History:  Social History   Substance Use Topics    Smoking status: Former Smoker     Quit date: 9/30/2007    Smokeless tobacco: Never Used    Alcohol use No       Allergies:  No Known Allergies      Review of Systems   Review of Systems   Constitutional: Positive for fever (100.0F transient ). Negative for activity change, appetite change, chills and unexpected weight change. HENT: Negative for congestion. Eyes: Negative for pain and visual disturbance. Respiratory: Positive for cough (dry ). Negative for shortness of breath. Cardiovascular: Positive for palpitations. Negative for chest pain. Gastrointestinal: Negative for abdominal pain, diarrhea, nausea and vomiting. Genitourinary: Negative for dysuria. Musculoskeletal: Negative for back pain. Skin: Negative for rash. Neurological: Negative for headaches. Physical Exam   Physical Exam   Constitutional: He is oriented to person, place, and time. He appears well-developed and well-nourished. Anxious    HENT:   Head: Normocephalic and atraumatic. Mouth/Throat: Oropharynx is clear and moist.   Eyes: Conjunctivae and EOM are normal. Pupils are equal, round, and reactive to light. Right eye exhibits no discharge. Left eye exhibits no discharge. Neck: Normal range of motion. Neck supple. Cardiovascular: Normal heart sounds. An irregularly irregular rhythm present. Tachycardia present. No murmur heard. Pulmonary/Chest: Effort normal and breath sounds normal. No respiratory distress. He has no wheezes. He has no rales. Cough on exam      Abdominal: Soft. Bowel sounds are normal. He exhibits no distension. There is no tenderness. Musculoskeletal: Normal range of motion. He exhibits no edema. Neurological: He is alert and oriented to person, place, and time. No cranial nerve deficit. He exhibits normal muscle tone. Skin: Skin is warm and dry. No rash noted. He is not diaphoretic. Nursing note and vitals reviewed. Diagnostic Study Results     Labs -     No results found for this or any previous visit (from the past 12 hour(s)). Radiologic Studies -   CXR Results  (Last 48 hours)    None            Medical Decision Making   I am the first provider for this patient. I reviewed the vital signs, available nursing notes, past medical history, past surgical history, family history and social history. Vital Signs-Reviewed the patient's vital signs. No data found. Pulse Oximetry Analysis - 99% on room air    Cardiac Monitor:   Rate: 150 bpm  Rhythm: Atrial Fibrillation      EKG interpretation: (Preliminary) 1303  Rhythm: sinus tachycardia; and regular . Rate (approx.): 147; Axis: normal; LA interval: normal; QRS interval: widened; ST/T wave: normal; Other findings: LVH; non-ischemic. Records Reviewed: Nursing Notes, Old Medical Records, Previous electrocardiograms, Previous Radiology Studies and Previous Laboratory Studies    Provider Notes (Medical Decision Making):   new onset a-fib with current infection previously deemed not PNA by PCP. Rule out metabolic, TSH infection and cardiogenic etiology    ED Course:   Initial assessment performed.  The patients presenting problems have been discussed, and they are in agreement with the care plan formulated and outlined with them.  I have encouraged them to ask questions as they arise throughout their visit. Progress Notes:  13:45 Patient continues in RVR. Discussed results, IVF infusing. Recommend repeat dosing with continued IVF. 14:30 Rhythm continues in RVR at 140-150 after #2 doses of IV antiarrhythmics. Recommend admission and consultation with cardiology. Spouse agrees, although patient disconcerted and does not want to stay in house. Will review case with cards. CONSULT NOTE:  3:05 PM  Mary Parker MD spoke with Dr. Wes Degroot,  Specialty: Cardiology  Discussed pt's hx, disposition, and available diagnostic and imaging results. Reviewed care plans. Consultant recommends he will evaluate the pt in the ED. Written by Zbigniew Armas, ED Scribe, as dictated by Mary Parker MD      CRITICAL CARE NOTE :  3:16 PM  IMPENDING DETERIORATION -Airway, Respiratory, Cardiovascular, CNS and Metabolic  ASSOCIATED RISK FACTORS - Hypotension, Shock, Hypoxia, Dysrhythmia, Metabolic changes and Dehydration  MANAGEMENT- Bedside Assessment and Supervision of Care  INTERPRETATION -  Xrays, ECG and Blood Pressure  INTERVENTIONS - hemodynamic mngmt and chemical cardioversion  CASE REVIEW - Nursing and Family  TREATMENT RESPONSE -Stable  PERFORMED BY - Self  NOTES   :  I have spent 50 minutes of critical care time involved in lab review, consultations with specialist, family decision- making, bedside attention and documentation. During this entire length of time I was immediately available to the patient . Marie Denis MD      SIGN OUT:  3:22 PM  Patient's presentation, labs/imaging and plan of care was reviewed with Nicci Almodovar MD as part of sign out. They will wait for cardiology evaluation prior to disposition as part of the plan discussed with the patient.     Nicci Almodovar MD's assistance in completion of this plan is greatly appreciated but it should be noted that I will be the provider of record for this patient. PROGRESS NOTE:  4:42 PM  Dr. Marquita Blue has seen the pt and is requesting for hospitalist to admit the pt. CONSULT NOTE:   4:44 PM  Tracy Holloway MD spoke with Dr. Peggy Cox,   Specialty: Hospitalist  Discussed pt's hx, disposition, and available diagnostic and imaging results. Reviewed care plans. Consultant will evaluate pt for admission. Written by KASSANDRA Castellanosibdemetrio, as dictated by Tracy Holloway MD.    Critical Care Time:   0    Disposition:  PLAN:  1. Admit    ADMIT NOTE:  4:44 PM  Patient is being admitted to the hospital by Dr. Peggy Cox. The results of their tests and reasons for their admission have been discussed with them and/or available family. They convey agreement and understanding for the need to be admitted and for their admission diagnosis. Consultation has been made with the inpatient physician specialist for hospitalization. Diagnosis     Clinical Impression:   1. Atrial fibrillation with RVR ([address]        Attestations:    Attestation: This note is prepared by Milla Branch. Pawel, acting as Scribe for Rona Ocampo MD.      Roan Ocampo MD: The scribe's documentation has been prepared under my direction and personally reviewed by me in its entirety. I confirm that the note above accurately reflects all work, treatment, procedures, and medical decision making performed by me.

## 2018-08-20 NOTE — CONSULTS
[address] [facility]  MR#: [id number]  : 1945  ACCOUNT #: [de-identified]   DATE OF SERVICE: 2018    REQUESTING PHYSICIAN:  Dr. Andrew Robins, [address] [place] [address] [address] [facility]:  Evaluate atrial fibrillation/flutter. CHIEF COMPLAINT:  Cough. HISTORY OF PRESENT ILLNESS:  The patient is a 79-year-old man with a history of coronary artery disease and aortic valve disease. He underwent bypass surgery and aortic valve replacement in 10/2007 at [address]  He received a porcine Medtronic valve. He has a history of hypertension and dyslipidemia. No prior history of myocardial infarction. He does have a history of congestive heart failure. He has not seen a cardiologist in 7 or 8 years. He is physically active and works as a supervisor for an apartment complex. He quit smoking a few years ago. The patient states that he has been \"feeling crappy. \"  He has had a cough with some thick sputum. He did have a low-grade fever recently. He has been feeling poorly and came into the [address] [place] [address] ER for evaluation. He was found to be in rapid atrial flutter and we were asked to see the patient for evaluation. He denies palpitations. No dizziness or syncope. No chest pain. He denies shortness of breath or leg swelling. As stated above, he has not seen a cardiologist in some time. PAST MEDICAL HISTORY:  Otherwise, healthy. No recent hospitalizations or other medical issues. PAST SURGICAL HISTORY:  Prior bypass surgery and aortic valve replacement, prior toenail removal.    CURRENT MEDICATIONS:  Carvedilol, Protonix, Mucinex, Lasix, Zocor, ibuprofen, aspirin. SOCIAL HISTORY:  The patient is a former smoker. He does not drink alcohol. He is  and supervises an apartment complex. He lives in [place]. FAMILY HISTORY:  Negative for heart disease. REVIEW OF SYSTEMS:  As noted above. No fever, no chills, no melena, no hematochezia.   No nausea or vomiting. No strokes, no TIAs, no pulmonary embolism, no cancer, no thyroid trouble. No diabetes. PHYSICAL EXAMINATION:  GENERAL:  Reveals an elderly white male in no acute distress. VITAL SIGNS:  Blood pressure 109/82, pulse 140, respirations 20-25 temperature 98. 3. HEENT:  Pupils are equal.  Oropharynx shows moist oral mucosa. NECK:  Supple. No masses or thyromegaly. No cervical or supraclavicular adenopathy. No definite carotid bruits or JVD. CHEST:  Scattered coarse breath sounds bilaterally. SKIN:  Warm and dry. No rashes. HEART:  Tachycardic rhythm, II/VI systolic murmur. No gallop or diastolic murmur heard. ABDOMEN:  Obese, soft, nontender, no masses or organomegaly. Bowel sounds positive. EXTREMITIES:  No cyanosis or clubbing. No edema. Distal pulses not well palpated. NEUROLOGIC:  No obvious gross motor deficits. LABORATORY DATA:  Hemoglobin 12.5, white count 19.5. BUN 16, creatinine 1.1. Chest x-ray:  No infiltrates or edema. EKG:  Atrial flutter, rate of 147 beats per minute, 2:1 AV block, nonspecific ST-T changes. ASSESSMENT:  1. Atrial flutter with rapid ventricular response. 2.  History coronary artery disease with prior coronary artery bypass grafting. 3.  History of congestive heart failure, unknown EF. 4.  Prior bioprosthetic aortic valve replacement. 5.  Hypertension. 6.  Dyslipidemia. 7.  Probable COPD with COPD exacerbation. 8.  Elevated white count. RECOMMENDATIONS:  I think the patient needs to be admitted and treated for probable COPD exacerbation. He is in atrial flutter, which was probably triggered by his underlying medical illness. He does need rate control and I would recommend starting IV amiodarone and Lovenox for anticoagulation. He needs an echocardiogram and cardiac enzymes need to be ordered as well. Further recommendations will follow. Thank you for this consult.       Kolton Iraheta MD       [facility] / [facility]  D: 08/20/2018 16:22     T: 08/20/2018 18:15  JOB #: [id number]  CC: Willie Matos MD  CC: Doreen Miller NP

## 2018-08-20 NOTE — PROGRESS NOTES
Pharmacy Clarification of Prior to Admission Medication Regimen     The patient was interviewed regarding clarification of the prior to admission medication regimen. Wife was present in room and obtained permission from patient to discuss drug regimen with visitor(s) present. Patient was questioned regarding use of any other inhalers, topical products, over the counter medications, herbal medications, vitamin products or ophthalmic/nasal/otic medication use. Information Obtained From: Patient, personal med list, RX Query    Pertinent Pharmacy Findings: NONE    PTA medication list was corrected to the following:     Prior to Admission Medications   Prescriptions Last Dose Informant Patient Reported? Taking?   aspirin 81 mg Tab 8/20/2018 at Unknown time Self Yes Yes   Sig: Take 81 mg by mouth three (3) days a week. Monday, Wednesday and Friday   carvedilol (COREG) 25 mg tablet 8/19/2018 at Unknown time Self Yes Yes   Sig: Take 25 mg by mouth nightly. furosemide (LASIX) 40 mg tablet 8/20/2018 at Unknown time Self No Yes   Sig: Take 1 Tab by mouth daily. guaiFENesin ER (MUCINEX) 600 mg ER tablet 8/20/2018 at Unknown time Self Yes Yes   Sig: Take 600 mg by mouth two (2) times daily as needed for Congestion. ibuprofen (MOTRIN) 800 mg tablet 8/19/2018 at Unknown time Self No Yes   Sig: TAKE 1 TABLET BY MOUTH EVERY 8 HOURS AS NEEDED FOR PAIN   pantoprazole (PROTONIX) 40 mg tablet 8/20/2018 at Unknown time Self Yes Yes   Sig: Take 40 mg by mouth daily.    simvastatin (ZOCOR) 40 mg tablet 8/19/2018 at Unknown time Self No Yes   Sig: TAKE 1 TABLET BY MOUTH NIGHTLY      Facility-Administered Medications: None          Thank you,  Josefina Heimlich, CPhT  Medication History Pharmacy Technician

## 2018-08-21 VITALS
HEART RATE: 65 BPM | DIASTOLIC BLOOD PRESSURE: 74 MMHG | SYSTOLIC BLOOD PRESSURE: 143 MMHG | HEIGHT: 69 IN | BODY MASS INDEX: 34.02 KG/M2 | TEMPERATURE: 98.1 F | WEIGHT: 229.72 LBS | RESPIRATION RATE: 18 BRPM | OXYGEN SATURATION: 99 %

## 2018-08-21 LAB
ALBUMIN SERPL-MCNC: 2.7 G/DL (ref 3.5–5)
ALBUMIN/GLOB SERPL: 0.8 {RATIO} (ref 1.1–2.2)
ALP SERPL-CCNC: 58 U/L (ref 45–117)
ALT SERPL-CCNC: 33 U/L (ref 12–78)
ANION GAP SERPL CALC-SCNC: 6 MMOL/L (ref 5–15)
AST SERPL-CCNC: 18 U/L (ref 15–37)
BASOPHILS # BLD: 0 K/UL (ref 0–0.1)
BASOPHILS NFR BLD: 0 % (ref 0–1)
BILIRUB SERPL-MCNC: 1.7 MG/DL (ref 0.2–1)
BUN SERPL-MCNC: 14 MG/DL (ref 6–20)
BUN/CREAT SERPL: 14 (ref 12–20)
CALCIUM SERPL-MCNC: 8.1 MG/DL (ref 8.5–10.1)
CHLORIDE SERPL-SCNC: 102 MMOL/L (ref 97–108)
CO2 SERPL-SCNC: 27 MMOL/L (ref 21–32)
CREAT SERPL-MCNC: 1.01 MG/DL (ref 0.7–1.3)
DIFFERENTIAL METHOD BLD: ABNORMAL
EOSINOPHIL # BLD: 0.4 K/UL (ref 0–0.4)
EOSINOPHIL NFR BLD: 2 % (ref 0–7)
ERYTHROCYTE [DISTWIDTH] IN BLOOD BY AUTOMATED COUNT: 14.4 % (ref 11.5–14.5)
GLOBULIN SER CALC-MCNC: 3.4 G/DL (ref 2–4)
GLUCOSE SERPL-MCNC: 108 MG/DL (ref 65–100)
HCT VFR BLD AUTO: 37.1 % (ref 36.6–50.3)
HGB BLD-MCNC: 12.1 G/DL (ref 12.1–17)
IMM GRANULOCYTES # BLD: 0.1 K/UL (ref 0–0.04)
IMM GRANULOCYTES NFR BLD AUTO: 1 % (ref 0–0.5)
LYMPHOCYTES # BLD: 1.1 K/UL (ref 0.8–3.5)
LYMPHOCYTES NFR BLD: 7 % (ref 12–49)
MCH RBC QN AUTO: 26.1 PG (ref 26–34)
MCHC RBC AUTO-ENTMCNC: 32.6 G/DL (ref 30–36.5)
MCV RBC AUTO: 80.1 FL (ref 80–99)
MONOCYTES # BLD: 1.5 K/UL (ref 0–1)
MONOCYTES NFR BLD: 9 % (ref 5–13)
NEUTS SEG # BLD: 12.8 K/UL (ref 1.8–8)
NEUTS SEG NFR BLD: 81 % (ref 32–75)
NRBC # BLD: 0 K/UL (ref 0–0.01)
NRBC BLD-RTO: 0 PER 100 WBC
PLATELET # BLD AUTO: 118 K/UL (ref 150–400)
PMV BLD AUTO: 12.3 FL (ref 8.9–12.9)
POTASSIUM SERPL-SCNC: 3.7 MMOL/L (ref 3.5–5.1)
PROT SERPL-MCNC: 6.1 G/DL (ref 6.4–8.2)
RBC # BLD AUTO: 4.63 M/UL (ref 4.1–5.7)
SODIUM SERPL-SCNC: 135 MMOL/L (ref 136–145)
T4 FREE SERPL-MCNC: 1.2 NG/DL (ref 0.8–1.5)
WBC # BLD AUTO: 15.8 K/UL (ref 4.1–11.1)

## 2018-08-21 PROCEDURE — 74011250636 HC RX REV CODE- 250/636: Performed by: INTERNAL MEDICINE

## 2018-08-21 PROCEDURE — 74011250637 HC RX REV CODE- 250/637: Performed by: INTERNAL MEDICINE

## 2018-08-21 PROCEDURE — 80053 COMPREHEN METABOLIC PANEL: CPT | Performed by: INTERNAL MEDICINE

## 2018-08-21 PROCEDURE — 84439 ASSAY OF FREE THYROXINE: CPT | Performed by: INTERNAL MEDICINE

## 2018-08-21 PROCEDURE — 85025 COMPLETE CBC W/AUTO DIFF WBC: CPT | Performed by: INTERNAL MEDICINE

## 2018-08-21 PROCEDURE — 36415 COLL VENOUS BLD VENIPUNCTURE: CPT | Performed by: INTERNAL MEDICINE

## 2018-08-21 PROCEDURE — 93306 TTE W/DOPPLER COMPLETE: CPT

## 2018-08-21 RX ORDER — AMIODARONE HYDROCHLORIDE 200 MG/1
200 TABLET ORAL 2 TIMES DAILY
Qty: 30 TAB | Refills: 0 | Status: SHIPPED | OUTPATIENT
Start: 2018-09-01 | End: 2020-01-01

## 2018-08-21 RX ORDER — CARVEDILOL 25 MG/1
25 TABLET ORAL 2 TIMES DAILY WITH MEALS
Qty: 60 TAB | Refills: 0 | Status: ON HOLD | OUTPATIENT
Start: 2018-08-21 | End: 2021-01-01

## 2018-08-21 RX ORDER — AMOXICILLIN AND CLAVULANATE POTASSIUM 875; 125 MG/1; MG/1
1 TABLET, FILM COATED ORAL 2 TIMES DAILY WITH MEALS
Qty: 8 TAB | Refills: 0 | Status: SHIPPED | OUTPATIENT
Start: 2018-08-21 | End: 2018-08-25

## 2018-08-21 RX ORDER — CARVEDILOL 12.5 MG/1
25 TABLET ORAL 2 TIMES DAILY WITH MEALS
Status: DISCONTINUED | OUTPATIENT
Start: 2018-08-21 | End: 2018-08-21 | Stop reason: HOSPADM

## 2018-08-21 RX ORDER — AMIODARONE HYDROCHLORIDE 200 MG/1
200 TABLET ORAL 3 TIMES DAILY
Status: DISCONTINUED | OUTPATIENT
Start: 2018-08-21 | End: 2018-08-21 | Stop reason: HOSPADM

## 2018-08-21 RX ORDER — AMIODARONE HYDROCHLORIDE 200 MG/1
200 TABLET ORAL 2 TIMES DAILY
Status: DISCONTINUED | OUTPATIENT
Start: 2018-08-31 | End: 2018-08-21 | Stop reason: HOSPADM

## 2018-08-21 RX ORDER — AMIODARONE HYDROCHLORIDE 200 MG/1
200 TABLET ORAL 3 TIMES DAILY
Qty: 30 TAB | Refills: 0 | Status: SHIPPED | OUTPATIENT
Start: 2018-08-21 | End: 2018-08-31

## 2018-08-21 RX ADMIN — ENOXAPARIN SODIUM 100 MG: 100 INJECTION SUBCUTANEOUS at 06:14

## 2018-08-21 RX ADMIN — ATORVASTATIN CALCIUM 20 MG: 20 TABLET, FILM COATED ORAL at 08:59

## 2018-08-21 RX ADMIN — GUAIFENESIN 600 MG: 600 TABLET, EXTENDED RELEASE ORAL at 09:09

## 2018-08-21 RX ADMIN — AMOXICILLIN AND CLAVULANATE POTASSIUM 1 TABLET: 875; 125 TABLET, FILM COATED ORAL at 08:59

## 2018-08-21 RX ADMIN — CARVEDILOL 12.5 MG: 12.5 TABLET, FILM COATED ORAL at 08:59

## 2018-08-21 RX ADMIN — Medication 10 ML: at 05:46

## 2018-08-21 RX ADMIN — BENZONATATE 100 MG: 100 CAPSULE ORAL at 04:45

## 2018-08-21 RX ADMIN — AMIODARONE HYDROCHLORIDE 0.5 MG/MIN: 50 INJECTION, SOLUTION INTRAVENOUS at 01:08

## 2018-08-21 RX ADMIN — PANTOPRAZOLE SODIUM 40 MG: 40 TABLET, DELAYED RELEASE ORAL at 08:59

## 2018-08-21 RX ADMIN — AMIODARONE HYDROCHLORIDE 200 MG: 200 TABLET ORAL at 10:57

## 2018-08-21 NOTE — PROGRESS NOTES
[address] Dr. Rubin Villaseñor came out of patients room stating that he wanted to leave AMA and he was in the process of removing telemetry box. Writer attempted to get [facility] paperwork together. [address] filled out AMA form and gave to Dr. Rubin Villaseñor to sign then writer was going to bring to patient. 26 Dr. Rubin Villaseñor took [facility] paperwork to patient. Entered patients room behind Dr. Rubin Villaseñor and overheard patient stated to Dr. Rubin Villaseñor I am not signing that paperwork    [phone] Patient at door sticking his hand out stating 'take this out' writer was in the process of getting tape off the roll when patient stated 'i have a band aid' writer took out IV with tip intact and discarded. Patient stated 'bye' and rushed out the room.  Unable to provide patient with DC paperwork due to patient not wanting to stay to receive

## 2018-08-21 NOTE — PROGRESS NOTES
Problem: Falls - Risk of  Goal: *Absence of Falls  Document Geni Fall Risk and appropriate interventions in the flowsheet.    Outcome: Progressing Towards Goal  Fall Risk Interventions:            Medication Interventions: Patient to call before getting OOB, Teach patient to arise slowly

## 2018-08-21 NOTE — DISCHARGE SUMMARY
Hospitalist Discharge Summary     Patient ID:  Louisa Sacks  [id number]  74 y.o.  1945    PCP on record: Felicia Coleman MD    Admit date: 8/20/2018  Discharge date and time: 8/21/2018      DISCHARGE DIAGNOSIS:  See below      CONSULTATIONS:  None    Excerpted HPI from H&P of Jennifer Zimmer MD:  Louisa Sacks is a 67 y.o.  male who presents with fatigue. Patient states that this may have been present for a couple of days but reports that today at work was bad enough to where after 2 hours he decided to come to the ED. Patient was dfound to be in afib with RVR on presentation. Patient does not have a history of afib. He reports recent \"chest congestion\" and has been coughing up yellow mucus. He denies any wheezing or SOB. He denies lightheadedness/dizziness, CP, palpitations or nausea/vomiting. He deneis fevers.      Patient reports having recently stopped prednisone because it made him feel \"wired. \" He was Rx'd this for treatment of poison ivy.     In the ED patient was started on an Amiodarone gtt and treatment dose Lovenox by Cardiology. HR persistently in 140's at rest at this time. ______________________________________________________________________  DISCHARGE SUMMARY/HOSPITAL COURSE:  for full details see H&P, daily progress notes, labs, consult notes. Atrial Fibrillation with RVR - resolved, currently rate controlled, in AFib still  -admit to telemetry, Inpatient  -appreciate Cardiology evaluation  -agree with amiodarone gtt  -continue sq Lovenox (treatment dose), started by Cardiology  -TTE  -patient states that he is leaving tomorrow, I informed him that if he converts to NSR and every thing looks okay that he may be discharged but I suspected taht he would at least be here a couple of days    8/21:  Currently on IV Amio - being converted to PO by Cardiology.   Started on Eliquis 5mg BID  Spoke with CM to have it priced out as well  Echo eval pending    Addendum to Progress Note from earlier:  Tele review shows pt in Afib with rate controlled. Will DC on Amiodarone 200mg TID for 10 days and then 200mg BID and Coreg dose increased to 25mg BID as advised by Cardiology. Will also dc on Eliquis 5mg BID. Had CM price out the drug and pt and Lana's interaction can be found under her note. Echo is still pending - was hopeful that it would be completed but pt does not want to wait anymore. RN and CM have been asking the pt to be     Attempted to speak with pt about the change in his medications as well as Eliquis and Amiodarone, two meds new to him, and he stated that he did not want to hear me. He stated that \"you will tell me something different from [facility]. \"     Pt states that after discharge he is going to find the hospital , the congressman, the engineering department and his . He then pointed his finger at me threateningly and said he was going \"to phuc. \"    After saying goodbye to the pt and wishing him the best, he ripped off his leads and asked for somebody to help take out his IV. I informed the RN. Pt to sign AMA. When I attempted to have pt sign the paperwork, he snatched it from my hand, crumbled it up, and threw it behind the bed. I asked if he was willing to sign another one, and he refused. The rest of my interaction with the patient from earlier in the day can be found in my progress note from this morning. CAD with CABG 10 years ago  Hyperlipidemia  Hypertension  -Continue with Coreg, Lasix as well as home statin  -Hold ASA      Acute Bronchitis  Leukocytosis, imprving: ? Related to infection or recent prednisone use for   SIRS, unclear if related to infection; CXR clear, no wheezing  -start Augmentin  -continue mucinex  -prn tessalon perles    8/21:   Will DC on Augmetin total for 5 days.      Hypocalcemia: Calcium corrects to 8.4  -give IV calcium and monitor  -Pt does not believe there is \"anything wrong with my calcium\"  ___________________________________________________________  Patient seen and examined by me on discharge day. Pertinent Findings:  Refer to my findings on the Progress Note from today.  _______________________________________________________________________  DISCHARGE MEDICATIONS:   Current Discharge Medication List      START taking these medications    Details   apixaban (ELIQUIS) 5 mg tablet Take 1 Tab by mouth two (2) times a day. Qty: 60 Tab, Refills: 0      amoxicillin-clavulanate (AUGMENTIN) 875-125 mg per tablet Take 1 Tab by mouth two (2) times daily (with meals) for 4 days. Qty: 8 Tab, Refills: 0      !! amiodarone (CORDARONE) 200 mg tablet Take 1 Tab by mouth three (3) times daily for 10 days. Qty: 30 Tab, Refills: 0      !! amiodarone (CORDARONE) 200 mg tablet Take 1 Tab by mouth two (2) times a day. Qty: 30 Tab, Refills: 0       !! - Potential duplicate medications found. Please discuss with provider. CONTINUE these medications which have CHANGED    Details   carvedilol (COREG) 25 mg tablet Take 1 Tab by mouth two (2) times daily (with meals). Qty: 60 Tab, Refills: 0         CONTINUE these medications which have NOT CHANGED    Details   pantoprazole (PROTONIX) 40 mg tablet Take 40 mg by mouth daily. guaiFENesin ER (MUCINEX) 600 mg ER tablet Take 600 mg by mouth two (2) times daily as needed for Congestion. furosemide (LASIX) 40 mg tablet Take 1 Tab by mouth daily.   Qty: 90 Tab, Refills: 0    Comments: **Patient requests 90 day supply**  Associated Diagnoses: Essential hypertension, benign      simvastatin (ZOCOR) 40 mg tablet TAKE 1 TABLET BY MOUTH NIGHTLY  Qty: 90 Tab, Refills: 0         STOP taking these medications       ibuprofen (MOTRIN) 800 mg tablet Comments:   Reason for Stopping:         aspirin 81 mg Tab Comments:   Reason for Stopping:               My Recommended Diet, Activity, Wound Care, and follow-up labs are listed in the patient's Discharge Insturctions which I have personally completed and reviewed. ______________________________________________________________________    Risk of deterioration: Low    Condition at Discharge:  Stable  ______________________________________________________________________    Disposition  Home with family, no needs  ______________________________________________________________________    Care Plan discussed with:   Patient, Family, RN, Care Manager, Consultant    ______________________________________________________________________    Code Status: Full Code  ______________________________________________________________________      Follow up with:   PCP : Emily Bright MD  Follow-up Information     Follow up With Details Comments Patria Cool MD On 8/29/2018 Cardiology - hospital follow up. Will be seen by Zulma Gardner NP. August 29 at 10:30am - Please arrive 15 minutes early. [employer] cards and photo ID [id number]  [id number]  [facility]  [phone]      Emily Bright MD In 2 weeks PCP - patient will schedule his own appointment. To be seen within 2 weeks of discharge.   [address] [facility]  [phone]                Total time in minutes spent coordinating this discharge (includes going over instructions, follow-up, prescriptions, and preparing report for sign off to her PCP) :  35 minutes    Signed:  Ewa Dasilva MD

## 2018-08-21 NOTE — PROGRESS NOTES
Bedside report completed with [facility]. Writer assumed care of patient. [address] stopped and patient transitioned to PO    1402 Dr. Camilo Avery paged due to patient still asking about DC    [phone] Spoke with Dr. Camilo Avery who stated that they are still waiting for the echo to be read before DC of patient    [phone] Informed patient who stated 'there is nothing wrong with my heart. I have been sitting here waiting to leave since 10 this morning'.  Informed patient that we are not holding him here and he can leave but it would be considered against medical advice as it is in his best interest to stay until Dr. Camilo Avery & cardio clear him pending the echo results

## 2018-08-21 NOTE — PROGRESS NOTES
Reason for Admission:   Afib w rvr                   RRAT Score:        5             Plan for utilizing home health: Will be returning to work. Likelihood of Readmission:  Low                         Transition of Care Plan:        Home with scripts. Follow up appointment with Cardiology 1 week. Follow up with PCP 2 weeks. CM met with patient to discuss discharge planning. Pt. Name and  confirmed as pt identifiers. Demographics confirmed. New phone number - add cell [phone]     New PCP - Ryan Dean MD    Patient is employed as a . Full time employment. Pt and wife live in a 2 story home, 3 steps entry, 13 steps bt floors. ADL's/IADL's - independent prior to admission to include driving  DME - none  Rx - [facility] [place]    CM discussed cost of Eliquis with patient.  $356 due to deductible not yet met. Patient is confident that he has met deductible. CM provided 30 day free trial card and $10 copay card. Patient will provide EOB to [facility] that states deductibe has been met. CM reviewed importance of Eliquis. Patient informed CM \"I will try it. If I don't like it I will stop it\". CM reviewed importance of discussing this with Cardiologist before stopping medication. Patient verbalized understanding. CM discussed follow up appointments to be scheduled. \"My PCP has walk in hours. I will set that appointment up\". Patient agreed to have CM schedule cardiology appointment. Wife will provide transportation home. Patient informed CM \"I am going home today\". CM discussed time with patient. Patient has meds running at this time and PO meds to be given after IV meds. Reviewed with nursing. Anticipate a 2pm discharge today. Attending informed of discharge plan. Care Management Interventions  PCP Verified by CM: Yes Laron Green MD)  Mode of Transport at Discharge:  Other (see comment) (wife)  Discharge Durable Medical Equipment: No  Physical Therapy Consult: No  Occupational Therapy Consult: No  Speech Therapy Consult: No  Current Support Network: Lives with Spouse  Confirm Follow Up Transport: Self  Plan discussed with Pt/Family/Caregiver: Yes  Discharge Location  Discharge Placement: Darío Lambert RN CM  Ext [phone]

## 2018-08-21 NOTE — DISCHARGE INSTRUCTIONS
Apixaban (By mouth)   Apixaban (a-PIX-a-ban)  Treats and prevents blood clots. This medicine is a blood thinner. Brand Name(s): Eliquis   There may be other brand names for this medicine. When This Medicine Should Not Be Used: This medicine is not right for everyone. Do not use it if you had an allergic reaction to apixaban or you have active bleeding. How to Use This Medicine:   Tablet  · Your doctor will tell you how much medicine to use. Do not use more than directed. · If you are not able to swallow the tablets whole, they may be crushed and mixed in water, 5% dextrose in water (D5W), apple juice, or applesauce. The crushed tablets may be mixed with 60 mL of water or D5W dose and given through a nasogastric tube (NGT). · This medicine should come with a Medication Guide. Ask your pharmacist for a copy if you do not have one. · Missed dose: Take a dose as soon as you remember. If it is almost time for your next dose, wait until then and take a regular dose. Do not take extra medicine to make up for a missed dose. · Store the medicine in a closed container at room temperature, away from heat, moisture, and direct light. Drugs and Foods to Avoid:   Ask your doctor or pharmacist before using any other medicine, including over-the-counter medicines, vitamins, and herbal products. · Some medicines can affect how apixaban works. Tell your doctor if you are using any of the following:   ¨ Carbamazepine, clarithromycin, itraconazole, ketoconazole, phenytoin, rifampin, ritonavir, Delgado's wort  ¨ Blood thinner (including clopidogrel, heparin, prasugrel, warfarin)  ¨ Medicine to treat depression  ¨ NSAID pain or arthritis medicine (including aspirin, celecoxib, diclofenac, ibuprofen, naproxen)  Warnings While Using This Medicine:   · Tell your doctor if you are pregnant or breastfeeding, or if you have kidney disease, liver disease, bleeding problems, or an artificial heart valve.   · Do not stop using this medicine suddenly without asking your doctor. You might have a higher risk of stroke for a short time after you stop using this medicine. · This medicine increases your risk for bleeding that can become serious if not controlled. You may also bruise easily, and it may take longer than usual for bleeding to stop. · This medicine may increase your risk for blood clots in your spine or back if you undergo an epidural or spinal puncture. This could lead to paralysis. Tell your doctor if you ever had spine problems or back surgery. · Tell any doctor or dentist who treats you that you are using this medicine. With your doctor's supervision, you may need to stop using this medicine several days before you have surgery or medical tests. · Your doctor will do lab tests at regular visits to check on the effects of this medicine. Keep all appointments. · Keep all medicine out of the reach of children. Never share your medicine with anyone. Possible Side Effects While Using This Medicine:   Call your doctor right away if you notice any of these side effects:  · Allergic reaction: Itching or hives, swelling in your face or hands, swelling or tingling in your mouth or throat, chest tightness, trouble breathing  · Change in how much or how often you urinate, red or pink urine  · Chest pain, trouble breathing  · Coughing up blood, vomiting blood or material that looks like coffee grounds  · Numbness, tingling, or muscle weakness in your legs or feet  · Red or black, tarry stools  · Unusual bleeding, bruising, or weakness  If you notice other side effects that you think are caused by this medicine, tell your doctor. Call your doctor for medical advice about side effects. You may report side effects to FDA at 5-762-FDA-1925  © 2017 [address] Information is for End User's use only and may not be sold, redistributed or otherwise used for commercial purposes. The above information is an  only. It is not intended as medical advice for individual conditions or treatments. Talk to your doctor, nurse or pharmacist before following any medical regimen to see if it is safe and effective for you.

## 2018-08-21 NOTE — PROGRESS NOTES
[address] August 21, 2018       RE: Zach Bowen      To Whom It May Concern,    This is to certify that Zach Bowen may return to his regular work activities on 8/22/2018. He was admitted to [facility] on 8/20 and treated for his condition before being discharged on 8/21. Please feel free to contact my office if you have any questions or concerns. Thank you for your assistance in this matter.       Sincerely,  Shlomo Arauz MD

## 2018-08-21 NOTE — PROGRESS NOTES
1940 Receiving bedside report from [facility]. introduced myself to patient as being his nurse for the night. Patient replied, \"Sorry for you. \" He went on to jokingly explain that he was from [place] and had a \"[place] [employer] attitude\" when he needed to.     2001 Administered PRN mucinex and tessalon mesha per patient request. Explained what they were and how they worked. 2108 Patient reporting that it is hot in room and he has opened the window. Checked thermostat which is set on 65 but no air seems to be moving from vent above sofa in room. After checking the vent, patient says, \"I work in air conditioning. 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 \" Offered patient ice packs to place behind neck or a cold wash cloth. Patient declined both. Patient complaining that he is getting \"different stories from 15 different people and no one is on the same page. \" Patient said, \"That's why I have to talk to my daughter who is a nurse because she can explain these things to me in lamens terms. \" Explained to patient I was reading the information right from the physician's note and that I had explained what the medications were for and what they would do in the simplest way possible. Patient reported that the \"Mucinex I was taking at home I could take every 4 hours. \"    Discussed again medications - mucinex ER and tessalon mesha- with patient. Patient said, \"I don't take medications often but when I do I expect them to work fast.\"    [phone] elevated d/t HR and RR. Patient agitated and in a-fib w/ RVR. Will administer PRN amiodarone bolus. 2251 HR still in 120s-130s. Administered ordered PRN amiodarone bolus. While administering bolus patient said that he would \"need to see someone tomorrow\" in reference to the [facility] not working. Told him that we would call as soon as possible in the morning to notify maintenance of the issue. He said, \"I mean I need to see someone higher up. Someone in charge. \" Offered to call the nursing supervisor to come see him and he declined. Asked him what we could do to make the situation better. He said, \"nothing. \" Asked him if he would like me to get the shift resource nurse to come speak to him. Patient again declined. Asked the patient if he would like anything else including another beverage and he declined everything. He said his heart rate was going up because he was \"aggravated. \" Answers to questions remain short. [address] on loan from CCU to patient's room and set up for patient. Placed on settings patient wanted. 1336 Patient calling out asking when he could have something for cough again. Explained to patient, as I previously explained, that even though medications were ordered BID and TID dosages should be take 12 and 8 hours apart. Called and confirmed this with pharmacist and relayed this back to patient.

## 2018-08-21 NOTE — PROGRESS NOTES
Pt extremely agitated about \"everything\" prior to me even entering the room. Pt states that he \"knows his body\" and that he has known his body for \"71 years old. \" States that \"you have only been a doctor for 8 years. \" Pt states \"the darren downstairs told me one thing, the Cardiologist another thing, you another thing. \" Pt states that he takes medications that his wife gives him but gives no guarantee about whether he will take any new meds that are prescribed to him. I went over what still needed to be done, in particular how we needed to control his HR and convert him from IV meds to oral. I also spoke to him about the need to be on a blood thinner due to his increased risk for stroke with Afib. Discussed risks vs benefits which the pt stated \"I know all about that okay? \" Pt also stated, \"I am not dumb. Don't make me out to be a dumb darren, okay? \"    Advised pt that I am trying to complete the workup and trying to discharge him safely today. Pt states that \"I am leaving today no matter what. \" Told pt that he can sign out against medical advice if he so wishes but that I would not advise it. He states that he has done that before and that \"they said I was going to die in 30 mins if I walked out of here and look at me, I am obviously fine. I know my body. \"    Again stated to the pt that I will try to complete everything by the end of this afternoon and then discharge him in a safe and appropriate manner.         Hospitalist Progress Note    NAME: Dillan Ferrari   :  1945   MRN:  [id number]       Assessment / Plan:  Atrial Fibrillation with RVR  -admit to telemetry, Inpatient  -appreciate Cardiology evaluation  -agree with amiodarone gtt  -continue sq Lovenox (treatment dose), started by Cardiology  -TTE  -patient states that he is leaving tomorrow, I informed him that if he converts to NSR and every thing looks okay that he may be discharged but I suspected taht he would at least be here a couple of days    8/21:  Currently on IV Amio - being converted to PO by Cardiology. Started on Eliquis 5mg BID  Spoke with CM to have it priced out as well  Echo eval pending     Low TSH:  -check Free T4     CAD with CABG 10 years ago  Hyperlipidemia  Hypertension  -continue ASA and Coreg, Lasix as well as home statin     Acute Bronchitis  Leukocytosis: ? Related to infection or recent prednisone use for   SIRS, unclear if related to infection; CXR clear, no wheezing  -start Augmentin  -continue mucinex  -prn tessalon perles    8/21: Will DC on Augmetin for 5 days     Hypocalcemia: Calcium corrects to 8.4  -give IV calcium and monitor  -Pt does not believe there is \"anything wrong with my calcium\"      30.0 - 39.9 Obese / Body mass index is 33.92 kg/(m^2). Code status: Full  Prophylaxis: Eliquis  Recommended Disposition: Home w/Family     Subjective:     Chief Complaint / Reason for Physician Visit  Note the conversation listed above. Discussed with RN events overnight. Review of Systems:  Symptom Y/N Comments  Symptom Y/N Comments   Fever/Chills    Chest Pain     Poor Appetite    Edema     Cough    Abdominal Pain     Sputum    Joint Pain     SOB/CUEVA    Pruritis/Rash     Nausea/vomit    Tolerating PT/OT     Diarrhea    Tolerating Diet     Constipation    Other       Could NOT obtain due to: uncooperative     Objective:     VITALS:   Last 24hrs VS reviewed since prior progress note.  Most recent are:  Patient Vitals for the past 24 hrs:   Temp Pulse Resp BP SpO2   08/21/18 0442 98 °F (36.7 °C) 93 20 116/85 95 %   08/20/18 2249 98.6 °F (37 °C) (!) 132 24 (!) 150/98 98 %   08/20/18 1935 98.9 °F (37.2 °C) (!) 142 22 134/75 97 %   08/20/18 1757 99.4 °F (37.4 °C) (!) 141 19 134/89 97 %   08/20/18 1615 - (!) 148 20 105/90 95 %   08/20/18 1600 - (!) 149 20 (!) 137/97 96 %   08/20/18 1530 - (!) 146 25 (!) 123/98 95 %   08/20/18 1502 - (!) 117 - - -   08/20/18 1500 - (!) 144 29 109/82 93 %   08/20/18 1426 - (!) 111 21 - 94 % 08/20/18 1415 - (!) 149 29 (!) 108/91 94 %   08/20/18 1412 - (!) 148 - (!) 108/91 -   08/20/18 1331 - (!) 151 19 (!) 131/105 97 %   08/20/18 1311 98.3 °F (36.8 °C) (!) 150 18 (!) 132/103 99 %       Intake/Output Summary (Last 24 hours) at 08/21/18 0900  Last data filed at 08/20/18 2003   Gross per 24 hour   Intake              480 ml   Output                0 ml   Net              480 ml        PHYSICAL EXAM:  General: Agitated, uncooperative  EENT:  EOMI. Anicteric sclerae. MMM  Resp:  CTA bilaterally, no wheezing or rales. No accessory muscle use  CV:  Irregular rate and rhythm  GI:  Soft, Non distended, Non tender.  +Bowel sounds  Neurologic:  Alert and oriented X 3, normal speech,   Psych:   Fair insight.   Skin:  No rashes. No jaundice    Reviewed most current lab test results and cultures  YES  Reviewed most current radiology test results   YES  Review and summation of old records today    NO  Reviewed patient's current orders and MAR    YES  PMH/ reviewed - no change compared to H&P  ________________________________________________________________________  Care Plan discussed with:    Comments   Patient x    Family      RN x    Care Manager     Consultant  x                      Multidiciplinary team rounds were held today with , nursing, pharmacist and clinical coordinator. Patient's plan of care was discussed; medications were reviewed and discharge planning was addressed. ________________________________________________________________________  Total NON critical care TIME:  45   Minutes    Total CRITICAL CARE TIME Spent:   Minutes non procedure based      Comments   >50% of visit spent in counseling and coordination of care x    ________________________________________________________________________  Pepper MD Frank     Procedures: see electronic medical records for all procedures/Xrays and details which were not copied into this note but were reviewed prior to creation of Plan. LABS:  I reviewed today's most current labs and imaging studies.   Pertinent labs include:  Recent Labs      08/21/18   0451  08/20/18   1410   WBC  15.8*  19.5*   HGB  12.1  12.5   HCT  37.1  38.2   PLT  118*  122*     Recent Labs      08/21/18   0451  08/20/18   1410  08/20/18   1327   NA  135*  137  136   K  3.7  3.7  3.8   CL  102  102  102   CO2  27  29  28   GLU  108*  115*  138*   BUN  14  16  16   CREA  1.01  1.13  1.13   CA  8.1*  7.6*  7.9*   MG   --   2.0  1.9   ALB  2.7*  3.0*   --    TBILI  1.7*  1.4*   --    SGOT  18  20   --    ALT  33  39   --        Signed: Sergo Ocampo MD

## 2018-08-21 NOTE — PROGRESS NOTES
Cardiology Progress Note      8/21/2018 8:35 AM    Admit Date: 8/20/2018          Subjective:  Up in chair. Wants to leave hosp. Remains in aflutter. Echo results pending. Overall feels better          Visit Vitals    /85 (BP 1 Location: Right arm, BP Patient Position: Sitting; At rest)    Pulse 93    Temp 98 °F (36.7 °C)    Resp 20    Ht 5' 9\" (1.753 m)    Wt 104.2 kg (229 lb 11.5 oz)    SpO2 95%    BMI 33.92 kg/m2     08/19 1901 - 08/21 0700  In: 480 [P.O.:360; I.V.:120]  Out: -         Objective:      Physical Exam:  VS as above  Chest CTA  Card tachy No S3     Data Review:   Labs:    BUN 14  Creat 1.0  Plat 118K  Hgb 12.1  Trop neg     Telemetry: aflutter R 120-130       Assessment:     1. Atrial flutter with rapid ventricular response. 2.  History coronary artery disease with prior coronary artery bypass grafting. 3.  History of congestive heart failure, unknown EF. 4.  Prior bioprosthetic aortic valve replacement. 5.  Hypertension. 6.  Dyslipidemia. 7.  Bronchitis with prob underlying COPD   8. Elevated white count. Plan: Start Eliquis and change amiodarone to PO. Increase Coreg to optimize BP and rate control. If echo ok ( no critical AS or EF less than 20%) and resting HR under 100  can be d/[place] and see me in 1-2 weeks.  Amiodarone should be 200 mg tid for 10 days then 200 mg bid

## 2018-08-26 LAB
BACTERIA SPEC CULT: NORMAL
BACTERIA SPEC CULT: NORMAL
SERVICE CMNT-IMP: NORMAL
SERVICE CMNT-IMP: NORMAL

## 2020-01-01 ENCOUNTER — APPOINTMENT (OUTPATIENT)
Dept: GENERAL RADIOLOGY | Age: 75
End: 2020-01-01
Attending: INTERNAL MEDICINE
Payer: MEDICARE

## 2020-01-01 ENCOUNTER — APPOINTMENT (OUTPATIENT)
Dept: GENERAL RADIOLOGY | Age: 75
DRG: 308 | End: 2020-01-01
Attending: EMERGENCY MEDICINE
Payer: MEDICARE

## 2020-01-01 ENCOUNTER — HOSPITAL ENCOUNTER (OUTPATIENT)
Dept: CARDIAC CATH/INVASIVE PROCEDURES | Age: 75
Setting detail: OBSERVATION
Discharge: HOME | End: 2020-10-16
Attending: INTERNAL MEDICINE | Admitting: INTERNAL MEDICINE
Payer: MEDICARE

## 2020-01-01 ENCOUNTER — APPOINTMENT (OUTPATIENT)
Dept: NON INVASIVE DIAGNOSTICS | Age: 75
DRG: 308 | End: 2020-01-01
Attending: HOSPITALIST
Payer: MEDICARE

## 2020-01-01 ENCOUNTER — HOSPITAL ENCOUNTER (OUTPATIENT)
Dept: PREADMISSION TESTING | Age: 75
Discharge: HOME | End: 2020-10-13
Payer: MEDICARE

## 2020-01-01 ENCOUNTER — PATIENT OUTREACH (OUTPATIENT)
Dept: CASE MANAGEMENT | Age: 75
End: 2020-01-01

## 2020-01-01 ENCOUNTER — ANESTHESIA EVENT (OUTPATIENT)
Dept: CARDIAC CATH/INVASIVE PROCEDURES | Age: 75
End: 2020-01-01
Payer: MEDICARE

## 2020-01-01 ENCOUNTER — TRANSCRIBE ORDER (OUTPATIENT)
Dept: REGISTRATION | Age: 75
End: 2020-01-01

## 2020-01-01 ENCOUNTER — APPOINTMENT (OUTPATIENT)
Dept: GENERAL RADIOLOGY | Age: 75
DRG: 308 | End: 2020-01-01
Attending: INTERNAL MEDICINE
Payer: MEDICARE

## 2020-01-01 ENCOUNTER — HOSPITAL ENCOUNTER (INPATIENT)
Age: 75
LOS: 3 days | Discharge: HOME | DRG: 308 | End: 2020-10-11
Attending: EMERGENCY MEDICINE | Admitting: HOSPITALIST
Payer: MEDICARE

## 2020-01-01 ENCOUNTER — ANESTHESIA (OUTPATIENT)
Dept: CARDIAC CATH/INVASIVE PROCEDURES | Age: 75
End: 2020-01-01
Payer: MEDICARE

## 2020-01-01 VITALS
RESPIRATION RATE: 16 BRPM | OXYGEN SATURATION: 98 % | HEIGHT: 68 IN | TEMPERATURE: 97.9 F | WEIGHT: 207.23 LBS | BODY MASS INDEX: 31.41 KG/M2 | SYSTOLIC BLOOD PRESSURE: 115 MMHG | HEART RATE: 66 BPM | DIASTOLIC BLOOD PRESSURE: 49 MMHG

## 2020-01-01 VITALS
BODY MASS INDEX: 30.43 KG/M2 | HEART RATE: 123 BPM | TEMPERATURE: 98.2 F | WEIGHT: 205.47 LBS | SYSTOLIC BLOOD PRESSURE: 131 MMHG | RESPIRATION RATE: 18 BRPM | OXYGEN SATURATION: 98 % | DIASTOLIC BLOOD PRESSURE: 87 MMHG | HEIGHT: 69 IN

## 2020-01-01 DIAGNOSIS — I48.3 TYPICAL ATRIAL FLUTTER (HCC): ICD-10-CM

## 2020-01-01 DIAGNOSIS — I50.9 CONGESTIVE HEART FAILURE, UNSPECIFIED HF CHRONICITY, UNSPECIFIED HEART FAILURE TYPE (HCC): ICD-10-CM

## 2020-01-01 DIAGNOSIS — I50.9 ACUTE CONGESTIVE HEART FAILURE, UNSPECIFIED HEART FAILURE TYPE (HCC): Primary | ICD-10-CM

## 2020-01-01 DIAGNOSIS — Z01.812 PRE-PROCEDURE LAB EXAM: ICD-10-CM

## 2020-01-01 DIAGNOSIS — Z01.812 PRE-PROCEDURE LAB EXAM: Primary | ICD-10-CM

## 2020-01-01 DIAGNOSIS — I48.91 ATRIAL FIBRILLATION WITH RVR (HCC): ICD-10-CM

## 2020-01-01 LAB
ALBUMIN SERPL-MCNC: 3.8 G/DL (ref 3.5–5)
ALBUMIN SERPL-MCNC: 4.1 G/DL (ref 3.5–5)
ALBUMIN/GLOB SERPL: 1 {RATIO} (ref 1.1–2.2)
ALBUMIN/GLOB SERPL: 1.1 {RATIO} (ref 1.1–2.2)
ALP SERPL-CCNC: 85 U/L (ref 45–117)
ALP SERPL-CCNC: 92 U/L (ref 45–117)
ALT SERPL-CCNC: 35 U/L (ref 12–78)
ALT SERPL-CCNC: 42 U/L (ref 12–78)
ANION GAP SERPL CALC-SCNC: 5 MMOL/L (ref 5–15)
ANION GAP SERPL CALC-SCNC: 7 MMOL/L (ref 5–15)
ANION GAP SERPL CALC-SCNC: 7 MMOL/L (ref 5–15)
ANION GAP SERPL CALC-SCNC: 9 MMOL/L (ref 5–15)
APPEARANCE UR: CLEAR
AST SERPL-CCNC: 33 U/L (ref 15–37)
AST SERPL-CCNC: 46 U/L (ref 15–37)
ATRIAL RATE: 111 BPM
ATRIAL RATE: 242 BPM
ATRIAL RATE: 50 BPM
BACTERIA URNS QL MICRO: NEGATIVE /HPF
BASOPHILS # BLD: 0.1 K/UL (ref 0–0.1)
BASOPHILS NFR BLD: 1 % (ref 0–1)
BILIRUB SERPL-MCNC: 1 MG/DL (ref 0.2–1)
BILIRUB SERPL-MCNC: 2.1 MG/DL (ref 0.2–1)
BILIRUB UR QL: NEGATIVE
BNP SERPL-MCNC: 1722 PG/ML
BNP SERPL-MCNC: 3911 PG/ML
BNP SERPL-MCNC: 4888 PG/ML
BUN SERPL-MCNC: 15 MG/DL (ref 6–20)
BUN SERPL-MCNC: 19 MG/DL (ref 6–20)
BUN SERPL-MCNC: 19 MG/DL (ref 6–20)
BUN SERPL-MCNC: 23 MG/DL (ref 6–20)
BUN/CREAT SERPL: 15 (ref 12–20)
BUN/CREAT SERPL: 15 (ref 12–20)
BUN/CREAT SERPL: 17 (ref 12–20)
BUN/CREAT SERPL: 19 (ref 12–20)
CALCIUM SERPL-MCNC: 8.4 MG/DL (ref 8.5–10.1)
CALCIUM SERPL-MCNC: 8.5 MG/DL (ref 8.5–10.1)
CALCIUM SERPL-MCNC: 9.1 MG/DL (ref 8.5–10.1)
CALCIUM SERPL-MCNC: 9.4 MG/DL (ref 8.5–10.1)
CALCULATED P AXIS, ECG09: -38 DEGREES
CALCULATED P AXIS, ECG09: 65 DEGREES
CALCULATED R AXIS, ECG10: 1 DEGREES
CALCULATED R AXIS, ECG10: 17 DEGREES
CALCULATED R AXIS, ECG10: 2 DEGREES
CALCULATED T AXIS, ECG11: -154 DEGREES
CALCULATED T AXIS, ECG11: 149 DEGREES
CALCULATED T AXIS, ECG11: 165 DEGREES
CHLORIDE SERPL-SCNC: 102 MMOL/L (ref 97–108)
CHLORIDE SERPL-SCNC: 103 MMOL/L (ref 97–108)
CHLORIDE SERPL-SCNC: 105 MMOL/L (ref 97–108)
CHLORIDE SERPL-SCNC: 97 MMOL/L (ref 97–108)
CO2 SERPL-SCNC: 26 MMOL/L (ref 21–32)
CO2 SERPL-SCNC: 27 MMOL/L (ref 21–32)
CO2 SERPL-SCNC: 28 MMOL/L (ref 21–32)
CO2 SERPL-SCNC: 31 MMOL/L (ref 21–32)
COLOR UR: ABNORMAL
COMMENT, HOLDF: NORMAL
COMMENT, HOLDF: NORMAL
CREAT SERPL-MCNC: 1.02 MG/DL (ref 0.7–1.3)
CREAT SERPL-MCNC: 1.14 MG/DL (ref 0.7–1.3)
CREAT SERPL-MCNC: 1.24 MG/DL (ref 0.7–1.3)
CREAT SERPL-MCNC: 1.26 MG/DL (ref 0.7–1.3)
DIAGNOSIS, 93000: NORMAL
DIFFERENTIAL METHOD BLD: ABNORMAL
DIGOXIN SERPL-MCNC: 1 NG/ML (ref 0.9–2)
ECHO AV AREA PEAK VELOCITY: 0.9 CM2
ECHO AV AREA PEAK VELOCITY: 0.9 CM2
ECHO AV AREA VTI: 1.05 CM2
ECHO AV AREA VTI: 1.05 CM2
ECHO AV MEAN GRADIENT: 9.36 MMHG
ECHO AV PEAK GRADIENT: 16.21 MMHG
ECHO AV PEAK VELOCITY: 201.31 CM/S
ECHO AV VTI: 37.76 CM
ECHO LA AREA 4C: 32.17 CM2
ECHO LA MAJOR AXIS: 4.33 CM
ECHO LA MINOR AXIS: 2.05 CM
ECHO LA VOL 2C: 138.58 ML (ref 18–58)
ECHO LA VOL 4C: 120.79 ML (ref 18–58)
ECHO LA VOL BP: 135.56 ML (ref 18–58)
ECHO LA VOL/BSA BIPLANE: 64.15 ML/M2 (ref 16–28)
ECHO LA VOLUME INDEX A2C: 65.58 ML/M2 (ref 16–28)
ECHO LA VOLUME INDEX A4C: 57.16 ML/M2 (ref 16–28)
ECHO LV EDV A2C: 277.05 ML
ECHO LV EDV A4C: 334.66 ML
ECHO LV EDV BP: 306.6 ML (ref 67–155)
ECHO LV EDV INDEX A4C: 158.4 ML/M2
ECHO LV EDV INDEX BP: 145.1 ML/M2
ECHO LV EDV NDEX A2C: 131.1 ML/M2
ECHO LV EJECTION FRACTION A2C: 21 PERCENT
ECHO LV EJECTION FRACTION A4C: 31 PERCENT
ECHO LV EJECTION FRACTION BIPLANE: 26.2 PERCENT (ref 55–100)
ECHO LV ESV A2C: 219.9 ML
ECHO LV ESV A4C: 231.39 ML
ECHO LV ESV BP: 226.21 ML (ref 22–58)
ECHO LV ESV INDEX A2C: 104.1 ML/M2
ECHO LV ESV INDEX A4C: 109.5 ML/M2
ECHO LV ESV INDEX BP: 107 ML/M2
ECHO LV INTERNAL DIMENSION DIASTOLIC: 6.63 CM (ref 4.2–5.9)
ECHO LV INTERNAL DIMENSION SYSTOLIC: 5.97 CM
ECHO LV IVSD: 1.13 CM (ref 0.6–1)
ECHO LV MASS 2D: 315.5 G (ref 88–224)
ECHO LV MASS INDEX 2D: 149.3 G/M2 (ref 49–115)
ECHO LV POSTERIOR WALL DIASTOLIC: 0.99 CM (ref 0.6–1)
ECHO LVOT DIAM: 2.18 CM
ECHO LVOT PEAK GRADIENT: 0.94 MMHG
ECHO LVOT PEAK VELOCITY: 48.42 CM/S
ECHO LVOT SV: 39.8 ML
ECHO LVOT VTI: 10.68 CM
ECHO MV AREA PHT: 2.85 CM2
ECHO MV AREA VTI: 1.23 CM2
ECHO MV EROA PISA: 0.25 CM2
ECHO MV MAX VELOCITY: 143.83 CM/S
ECHO MV MEAN GRADIENT: 2.49 MMHG
ECHO MV PEAK GRADIENT: 8.27 MMHG
ECHO MV PRESSURE HALF TIME (PHT): 0.08 S
ECHO MV REGURGITANT RADIUS PISA: 0.69 CM
ECHO MV REGURGITANT VOLUME: 37.46 ML
ECHO MV REGURGITANT VTIA: 150.41 CM
ECHO MV VTI: 32.2 CM
ECHO PV PEAK INSTANTANEOUS GRADIENT SYSTOLIC: 2.09 MMHG
ECHO RV INTERNAL DIMENSION: 4.76 CM
ECHO RV TAPSE: 0.75 CM (ref 1.5–2)
ECHO TV REGURGITANT MAX VELOCITY: 180.59 CM/S
ECHO TV REGURGITANT MAX VELOCITY: 198.01 CM/S
ECHO TV REGURGITANT MAX VELOCITY: 229.54 CM/S
ECHO TV REGURGITANT MAX VELOCITY: 233.83 CM/S
ECHO TV REGURGITANT MAX VELOCITY: 233.83 CM/S
ECHO TV REGURGITANT PEAK GRADIENT: 13.04 MMHG
ECHO TV REGURGITANT PEAK GRADIENT: 15.68 MMHG
ECHO TV REGURGITANT PEAK GRADIENT: 21.08 MMHG
ECHO TV REGURGITANT PEAK GRADIENT: 21.87 MMHG
ECHO TV REGURGITANT PEAK GRADIENT: 21.87 MMHG
EOSINOPHIL # BLD: 1 K/UL (ref 0–0.4)
EOSINOPHIL NFR BLD: 7 % (ref 0–7)
EPITH CASTS URNS QL MICRO: ABNORMAL /LPF
ERYTHROCYTE [DISTWIDTH] IN BLOOD BY AUTOMATED COUNT: 14.3 % (ref 11.5–14.5)
ERYTHROCYTE [DISTWIDTH] IN BLOOD BY AUTOMATED COUNT: 14.5 % (ref 11.5–14.5)
GLOBULIN SER CALC-MCNC: 3.8 G/DL (ref 2–4)
GLOBULIN SER CALC-MCNC: 3.9 G/DL (ref 2–4)
GLUCOSE SERPL-MCNC: 100 MG/DL (ref 65–100)
GLUCOSE SERPL-MCNC: 105 MG/DL (ref 65–100)
GLUCOSE SERPL-MCNC: 205 MG/DL (ref 65–100)
GLUCOSE SERPL-MCNC: 94 MG/DL (ref 65–100)
GLUCOSE UR STRIP.AUTO-MCNC: NEGATIVE MG/DL
HCT VFR BLD AUTO: 46.3 % (ref 36.6–50.3)
HCT VFR BLD AUTO: 47.5 % (ref 36.6–50.3)
HGB BLD-MCNC: 15 G/DL (ref 12.1–17)
HGB BLD-MCNC: 15.5 G/DL (ref 12.1–17)
HGB UR QL STRIP: NEGATIVE
HYALINE CASTS URNS QL MICRO: ABNORMAL /LPF (ref 0–5)
IMM GRANULOCYTES # BLD AUTO: 0 K/UL (ref 0–0.04)
IMM GRANULOCYTES NFR BLD AUTO: 0 % (ref 0–0.5)
KETONES UR QL STRIP.AUTO: NEGATIVE MG/DL
LACTATE SERPL-SCNC: 1.1 MMOL/L (ref 0.4–2)
LACTATE SERPL-SCNC: 2.8 MMOL/L (ref 0.4–2)
LEUKOCYTE ESTERASE UR QL STRIP.AUTO: NEGATIVE
LIPASE SERPL-CCNC: 154 U/L (ref 73–393)
LYMPHOCYTES # BLD: 1.6 K/UL (ref 0.8–3.5)
LYMPHOCYTES NFR BLD: 11 % (ref 12–49)
MAGNESIUM SERPL-MCNC: 1.6 MG/DL (ref 1.6–2.4)
MAGNESIUM SERPL-MCNC: 2 MG/DL (ref 1.6–2.4)
MCH RBC QN AUTO: 26.3 PG (ref 26–34)
MCH RBC QN AUTO: 26.4 PG (ref 26–34)
MCHC RBC AUTO-ENTMCNC: 32.4 G/DL (ref 30–36.5)
MCHC RBC AUTO-ENTMCNC: 32.6 G/DL (ref 30–36.5)
MCV RBC AUTO: 80.6 FL (ref 80–99)
MCV RBC AUTO: 81.5 FL (ref 80–99)
MONOCYTES # BLD: 1.2 K/UL (ref 0–1)
MONOCYTES NFR BLD: 8 % (ref 5–13)
NEUTS SEG # BLD: 10.6 K/UL (ref 1.8–8)
NEUTS SEG NFR BLD: 73 % (ref 32–75)
NITRITE UR QL STRIP.AUTO: NEGATIVE
NRBC # BLD: 0 K/UL (ref 0–0.01)
NRBC # BLD: 0 K/UL (ref 0–0.01)
NRBC BLD-RTO: 0 PER 100 WBC
NRBC BLD-RTO: 0 PER 100 WBC
P-R INTERVAL, ECG05: 240 MS
PH UR STRIP: 6 [PH] (ref 5–8)
PHOSPHATE SERPL-MCNC: 2.1 MG/DL (ref 2.6–4.7)
PLATELET # BLD AUTO: 152 K/UL (ref 150–400)
PLATELET # BLD AUTO: 155 K/UL (ref 150–400)
PMV BLD AUTO: 12.8 FL (ref 8.9–12.9)
POTASSIUM SERPL-SCNC: 2.8 MMOL/L (ref 3.5–5.1)
POTASSIUM SERPL-SCNC: 3.5 MMOL/L (ref 3.5–5.1)
POTASSIUM SERPL-SCNC: 3.7 MMOL/L (ref 3.5–5.1)
POTASSIUM SERPL-SCNC: 3.8 MMOL/L (ref 3.5–5.1)
PROT SERPL-MCNC: 7.6 G/DL (ref 6.4–8.2)
PROT SERPL-MCNC: 8 G/DL (ref 6.4–8.2)
PROT UR STRIP-MCNC: 100 MG/DL
Q-T INTERVAL, ECG07: 304 MS
Q-T INTERVAL, ECG07: 364 MS
Q-T INTERVAL, ECG07: 432 MS
QRS DURATION, ECG06: 118 MS
QRS DURATION, ECG06: 120 MS
QRS DURATION, ECG06: 122 MS
QTC CALCULATION (BEZET), ECG08: 413 MS
QTC CALCULATION (BEZET), ECG08: 431 MS
QTC CALCULATION (BEZET), ECG08: 485 MS
RBC # BLD AUTO: 5.68 M/UL (ref 4.1–5.7)
RBC # BLD AUTO: 5.89 M/UL (ref 4.1–5.7)
RBC #/AREA URNS HPF: ABNORMAL /HPF (ref 0–5)
RBC MORPH BLD: ABNORMAL
SAMPLES BEING HELD,HOLD: NORMAL
SAMPLES BEING HELD,HOLD: NORMAL
SARS-COV-2, COV2NT: NOT DETECTED
SODIUM SERPL-SCNC: 134 MMOL/L (ref 136–145)
SODIUM SERPL-SCNC: 136 MMOL/L (ref 136–145)
SODIUM SERPL-SCNC: 138 MMOL/L (ref 136–145)
SODIUM SERPL-SCNC: 139 MMOL/L (ref 136–145)
SP GR UR REFRACTOMETRY: 1.02 (ref 1–1.03)
TROPONIN I SERPL-MCNC: 0.09 NG/ML
TROPONIN I SERPL-MCNC: 0.09 NG/ML
UR CULT HOLD, URHOLD: NORMAL
UROBILINOGEN UR QL STRIP.AUTO: 2 EU/DL (ref 0.2–1)
VENTRICULAR RATE, ECG03: 107 BPM
VENTRICULAR RATE, ECG03: 121 BPM
VENTRICULAR RATE, ECG03: 55 BPM
WBC # BLD AUTO: 10.8 K/UL (ref 4.1–11.1)
WBC # BLD AUTO: 14.5 K/UL (ref 4.1–11.1)
WBC URNS QL MICRO: ABNORMAL /HPF (ref 0–4)

## 2020-01-01 PROCEDURE — 85027 COMPLETE CBC AUTOMATED: CPT

## 2020-01-01 PROCEDURE — 74011250636 HC RX REV CODE- 250/636: Performed by: NURSE ANESTHETIST, CERTIFIED REGISTERED

## 2020-01-01 PROCEDURE — 77030039046 HC PAD DEFIB RADIOTRNSPNT CNMD -B: Performed by: INTERNAL MEDICINE

## 2020-01-01 PROCEDURE — 5A09357 ASSISTANCE WITH RESPIRATORY VENTILATION, LESS THAN 24 CONSECUTIVE HOURS, CONTINUOUS POSITIVE AIRWAY PRESSURE: ICD-10-PCS | Performed by: EMERGENCY MEDICINE

## 2020-01-01 PROCEDURE — 83605 ASSAY OF LACTIC ACID: CPT

## 2020-01-01 PROCEDURE — 76060000031 HC ANESTHESIA FIRST 0.5 HR: Performed by: INTERNAL MEDICINE

## 2020-01-01 PROCEDURE — 83735 ASSAY OF MAGNESIUM: CPT

## 2020-01-01 PROCEDURE — 77030028700 HC BLD TISS PLSM MEDT -E: Performed by: INTERNAL MEDICINE

## 2020-01-01 PROCEDURE — 80048 BASIC METABOLIC PNL TOTAL CA: CPT

## 2020-01-01 PROCEDURE — 74011250637 HC RX REV CODE- 250/637: Performed by: HOSPITALIST

## 2020-01-01 PROCEDURE — 99218 HC RM OBSERVATION: CPT

## 2020-01-01 PROCEDURE — 74011250637 HC RX REV CODE- 250/637: Performed by: INTERNAL MEDICINE

## 2020-01-01 PROCEDURE — 74011250636 HC RX REV CODE- 250/636: Performed by: HOSPITALIST

## 2020-01-01 PROCEDURE — 99153 MOD SED SAME PHYS/QHP EA: CPT | Performed by: INTERNAL MEDICINE

## 2020-01-01 PROCEDURE — 80053 COMPREHEN METABOLIC PANEL: CPT

## 2020-01-01 PROCEDURE — 83880 ASSAY OF NATRIURETIC PEPTIDE: CPT

## 2020-01-01 PROCEDURE — 74011000272 HC RX REV CODE- 272: Performed by: INTERNAL MEDICINE

## 2020-01-01 PROCEDURE — 80162 ASSAY OF DIGOXIN TOTAL: CPT

## 2020-01-01 PROCEDURE — 74011000250 HC RX REV CODE- 250: Performed by: INTERNAL MEDICINE

## 2020-01-01 PROCEDURE — 74011250637 HC RX REV CODE- 250/637: Performed by: EMERGENCY MEDICINE

## 2020-01-01 PROCEDURE — 99285 EMERGENCY DEPT VISIT HI MDM: CPT

## 2020-01-01 PROCEDURE — 92960 CARDIOVERSION ELECTRIC EXT: CPT | Performed by: INTERNAL MEDICINE

## 2020-01-01 PROCEDURE — 81001 URINALYSIS AUTO W/SCOPE: CPT

## 2020-01-01 PROCEDURE — 71045 X-RAY EXAM CHEST 1 VIEW: CPT

## 2020-01-01 PROCEDURE — 93306 TTE W/DOPPLER COMPLETE: CPT

## 2020-01-01 PROCEDURE — C1898 LEAD, PMKR, OTHER THAN TRANS: HCPCS | Performed by: INTERNAL MEDICINE

## 2020-01-01 PROCEDURE — 36415 COLL VENOUS BLD VENIPUNCTURE: CPT

## 2020-01-01 PROCEDURE — 74011000258 HC RX REV CODE- 258: Performed by: HOSPITALIST

## 2020-01-01 PROCEDURE — 74011000250 HC RX REV CODE- 250: Performed by: HOSPITALIST

## 2020-01-01 PROCEDURE — 85025 COMPLETE CBC W/AUTO DIFF WBC: CPT

## 2020-01-01 PROCEDURE — 87635 SARS-COV-2 COVID-19 AMP PRB: CPT

## 2020-01-01 PROCEDURE — 74011000250 HC RX REV CODE- 250: Performed by: NURSE ANESTHETIST, CERTIFIED REGISTERED

## 2020-01-01 PROCEDURE — 2709999900 HC NON-CHARGEABLE SUPPLY: Performed by: INTERNAL MEDICINE

## 2020-01-01 PROCEDURE — 84484 ASSAY OF TROPONIN QUANT: CPT

## 2020-01-01 PROCEDURE — C1892 INTRO/SHEATH,FIXED,PEEL-AWAY: HCPCS | Performed by: INTERNAL MEDICINE

## 2020-01-01 PROCEDURE — 93005 ELECTROCARDIOGRAM TRACING: CPT

## 2020-01-01 PROCEDURE — 84100 ASSAY OF PHOSPHORUS: CPT

## 2020-01-01 PROCEDURE — A4565 SLINGS: HCPCS | Performed by: INTERNAL MEDICINE

## 2020-01-01 PROCEDURE — 94660 CPAP INITIATION&MGMT: CPT

## 2020-01-01 PROCEDURE — 65660000001 HC RM ICU INTERMED STEPDOWN

## 2020-01-01 PROCEDURE — 94760 N-INVAS EAR/PLS OXIMETRY 1: CPT

## 2020-01-01 PROCEDURE — 74011000636 HC RX REV CODE- 636: Performed by: INTERNAL MEDICINE

## 2020-01-01 PROCEDURE — 33208 INSRT HEART PM ATRIAL & VENT: CPT | Performed by: INTERNAL MEDICINE

## 2020-01-01 PROCEDURE — 83690 ASSAY OF LIPASE: CPT

## 2020-01-01 PROCEDURE — 99152 MOD SED SAME PHYS/QHP 5/>YRS: CPT | Performed by: INTERNAL MEDICINE

## 2020-01-01 PROCEDURE — 77030010507 HC ADH SKN DERMBND J&J -B: Performed by: INTERNAL MEDICINE

## 2020-01-01 PROCEDURE — 96374 THER/PROPH/DIAG INJ IV PUSH: CPT

## 2020-01-01 PROCEDURE — 74011250636 HC RX REV CODE- 250/636: Performed by: INTERNAL MEDICINE

## 2020-01-01 PROCEDURE — C1785 PMKR, DUAL, RATE-RESP: HCPCS | Performed by: INTERNAL MEDICINE

## 2020-01-01 PROCEDURE — 77030040375: Performed by: INTERNAL MEDICINE

## 2020-01-01 PROCEDURE — 93312 ECHO TRANSESOPHAGEAL: CPT

## 2020-01-01 PROCEDURE — 77030022704 HC SUT VLOC COVD -B: Performed by: INTERNAL MEDICINE

## 2020-01-01 DEVICE — LEAD PCMKR 58CM -- CAPSURE SENSE MRI SURESCAN: Type: IMPLANTABLE DEVICE | Status: FUNCTIONAL

## 2020-01-01 DEVICE — LEAD 5076-52 MRI US RCMCRD
Type: IMPLANTABLE DEVICE | Status: FUNCTIONAL
Brand: CAPSUREFIX NOVUS MRI™ SURESCAN®

## 2020-01-01 DEVICE — IPG W1DR01 AZURE XT DR MRI WL USA
Type: IMPLANTABLE DEVICE | Status: FUNCTIONAL
Brand: AZURE™ XT DR MRI SURESCAN™

## 2020-01-01 RX ORDER — DIGOXIN 125 MCG
0.25 TABLET ORAL DAILY
Qty: 60 TAB | Refills: 0 | Status: SHIPPED | OUTPATIENT
Start: 2020-01-01 | End: 2020-01-01

## 2020-01-01 RX ORDER — FUROSEMIDE 40 MG/1
40 TABLET ORAL DAILY
Status: DISCONTINUED | OUTPATIENT
Start: 2020-01-01 | End: 2020-01-01

## 2020-01-01 RX ORDER — SPIRONOLACTONE 25 MG/1
25 TABLET ORAL DAILY
Status: DISCONTINUED | OUTPATIENT
Start: 2020-01-01 | End: 2020-01-01

## 2020-01-01 RX ORDER — SPIRONOLACTONE 25 MG/1
25 TABLET ORAL DAILY
Qty: 30 TAB | Refills: 0 | Status: SHIPPED | OUTPATIENT
Start: 2020-01-01

## 2020-01-01 RX ORDER — ASPIRIN 81 MG/1
81 TABLET ORAL
COMMUNITY
End: 2020-01-01

## 2020-01-01 RX ORDER — FUROSEMIDE 40 MG/1
40 TABLET ORAL 2 TIMES DAILY
Qty: 60 TAB | Refills: 0 | Status: SHIPPED | OUTPATIENT
Start: 2020-01-01 | End: 2020-01-01

## 2020-01-01 RX ORDER — ENOXAPARIN SODIUM 100 MG/ML
1 INJECTION SUBCUTANEOUS EVERY 12 HOURS
Status: DISCONTINUED | OUTPATIENT
Start: 2020-01-01 | End: 2020-01-01

## 2020-01-01 RX ORDER — PANTOPRAZOLE SODIUM 40 MG/1
40 TABLET, DELAYED RELEASE ORAL DAILY
Status: DISCONTINUED | OUTPATIENT
Start: 2020-01-01 | End: 2020-01-01 | Stop reason: HOSPADM

## 2020-01-01 RX ORDER — PROPOFOL 10 MG/ML
INJECTION, EMULSION INTRAVENOUS AS NEEDED
Status: DISCONTINUED | OUTPATIENT
Start: 2020-01-01 | End: 2020-01-01 | Stop reason: HOSPADM

## 2020-01-01 RX ORDER — SODIUM CHLORIDE 9 MG/ML
INJECTION, SOLUTION INTRAVENOUS
Status: DISCONTINUED | OUTPATIENT
Start: 2020-01-01 | End: 2020-01-01 | Stop reason: HOSPADM

## 2020-01-01 RX ORDER — ASPIRIN 325 MG
325 TABLET ORAL DAILY
Status: DISCONTINUED | OUTPATIENT
Start: 2020-01-01 | End: 2020-01-01

## 2020-01-01 RX ORDER — FENTANYL CITRATE 50 UG/ML
INJECTION, SOLUTION INTRAMUSCULAR; INTRAVENOUS AS NEEDED
Status: DISCONTINUED | OUTPATIENT
Start: 2020-01-01 | End: 2020-01-01 | Stop reason: HOSPADM

## 2020-01-01 RX ORDER — ATORVASTATIN CALCIUM 40 MG/1
40 TABLET, FILM COATED ORAL
Status: DISCONTINUED | OUTPATIENT
Start: 2020-01-01 | End: 2020-01-01 | Stop reason: HOSPADM

## 2020-01-01 RX ORDER — DIGOXIN 125 MCG
0.12 TABLET ORAL DAILY
Status: DISCONTINUED | OUTPATIENT
Start: 2020-01-01 | End: 2020-01-01 | Stop reason: HOSPADM

## 2020-01-01 RX ORDER — CEFAZOLIN SODIUM/WATER 2 G/20 ML
SYRINGE (ML) INTRAVENOUS AS NEEDED
Status: DISCONTINUED | OUTPATIENT
Start: 2020-01-01 | End: 2020-01-01 | Stop reason: HOSPADM

## 2020-01-01 RX ORDER — IPRATROPIUM BROMIDE AND ALBUTEROL SULFATE 2.5; .5 MG/3ML; MG/3ML
3 SOLUTION RESPIRATORY (INHALATION)
Status: DISCONTINUED | OUTPATIENT
Start: 2020-01-01 | End: 2020-01-01 | Stop reason: HOSPADM

## 2020-01-01 RX ORDER — PHENYLEPHRINE HCL IN 0.9% NACL 0.4MG/10ML
SYRINGE (ML) INTRAVENOUS AS NEEDED
Status: DISCONTINUED | OUTPATIENT
Start: 2020-01-01 | End: 2020-01-01 | Stop reason: HOSPADM

## 2020-01-01 RX ORDER — METOPROLOL TARTRATE 50 MG/1
50 TABLET ORAL EVERY 8 HOURS
Status: DISCONTINUED | OUTPATIENT
Start: 2020-01-01 | End: 2020-01-01

## 2020-01-01 RX ORDER — SODIUM CHLORIDE 0.9 % (FLUSH) 0.9 %
5-40 SYRINGE (ML) INJECTION EVERY 8 HOURS
Status: DISCONTINUED | OUTPATIENT
Start: 2020-01-01 | End: 2020-01-01 | Stop reason: HOSPADM

## 2020-01-01 RX ORDER — FUROSEMIDE 10 MG/ML
40 INJECTION INTRAMUSCULAR; INTRAVENOUS 2 TIMES DAILY
Status: DISCONTINUED | OUTPATIENT
Start: 2020-01-01 | End: 2020-01-01

## 2020-01-01 RX ORDER — ATORVASTATIN CALCIUM 40 MG/1
40 TABLET, FILM COATED ORAL
Qty: 30 TAB | Refills: 0 | Status: SHIPPED | OUTPATIENT
Start: 2020-01-01

## 2020-01-01 RX ORDER — SODIUM CHLORIDE 0.9 % (FLUSH) 0.9 %
5-40 SYRINGE (ML) INJECTION AS NEEDED
Status: DISCONTINUED | OUTPATIENT
Start: 2020-01-01 | End: 2020-01-01 | Stop reason: HOSPADM

## 2020-01-01 RX ORDER — CEFAZOLIN SODIUM/WATER 2 G/20 ML
2 SYRINGE (ML) INTRAVENOUS
Status: DISCONTINUED | OUTPATIENT
Start: 2020-01-01 | End: 2020-01-01 | Stop reason: HOSPADM

## 2020-01-01 RX ORDER — LIDOCAINE HYDROCHLORIDE AND EPINEPHRINE 10; 10 MG/ML; UG/ML
INJECTION, SOLUTION INFILTRATION; PERINEURAL AS NEEDED
Status: DISCONTINUED | OUTPATIENT
Start: 2020-01-01 | End: 2020-01-01 | Stop reason: HOSPADM

## 2020-01-01 RX ORDER — METOPROLOL TARTRATE 25 MG/1
25 TABLET, FILM COATED ORAL EVERY 8 HOURS
Status: DISCONTINUED | OUTPATIENT
Start: 2020-01-01 | End: 2020-01-01

## 2020-01-01 RX ORDER — EPHEDRINE SULFATE/0.9% NACL/PF 50 MG/5 ML
SYRINGE (ML) INTRAVENOUS AS NEEDED
Status: DISCONTINUED | OUTPATIENT
Start: 2020-01-01 | End: 2020-01-01 | Stop reason: HOSPADM

## 2020-01-01 RX ORDER — SPIRONOLACTONE 25 MG/1
25 TABLET ORAL DAILY
Status: DISCONTINUED | OUTPATIENT
Start: 2020-01-01 | End: 2020-01-01 | Stop reason: HOSPADM

## 2020-01-01 RX ORDER — ASPIRIN 81 MG/1
81 TABLET ORAL DAILY
Status: DISCONTINUED | OUTPATIENT
Start: 2020-01-01 | End: 2020-01-01 | Stop reason: HOSPADM

## 2020-01-01 RX ORDER — CEPHALEXIN 500 MG/1
500 CAPSULE ORAL 2 TIMES DAILY
Qty: 14 CAP | Refills: 0 | Status: SHIPPED | OUTPATIENT
Start: 2020-01-01 | End: 2020-01-01

## 2020-01-01 RX ORDER — MIDAZOLAM HYDROCHLORIDE 1 MG/ML
INJECTION, SOLUTION INTRAMUSCULAR; INTRAVENOUS AS NEEDED
Status: DISCONTINUED | OUTPATIENT
Start: 2020-01-01 | End: 2020-01-01 | Stop reason: HOSPADM

## 2020-01-01 RX ORDER — CARVEDILOL 12.5 MG/1
25 TABLET ORAL
Status: COMPLETED | OUTPATIENT
Start: 2020-01-01 | End: 2020-01-01

## 2020-01-01 RX ORDER — LIDOCAINE HYDROCHLORIDE 20 MG/ML
INJECTION, SOLUTION EPIDURAL; INFILTRATION; INTRACAUDAL; PERINEURAL AS NEEDED
Status: DISCONTINUED | OUTPATIENT
Start: 2020-01-01 | End: 2020-01-01 | Stop reason: HOSPADM

## 2020-01-01 RX ORDER — ATROPINE SULFATE 0.4 MG/ML
INJECTION, SOLUTION ENDOTRACHEAL; INTRAMEDULLARY; INTRAMUSCULAR; INTRAVENOUS; SUBCUTANEOUS AS NEEDED
Status: DISCONTINUED | OUTPATIENT
Start: 2020-01-01 | End: 2020-01-01 | Stop reason: HOSPADM

## 2020-01-01 RX ORDER — FUROSEMIDE 40 MG/1
40 TABLET ORAL
Status: DISCONTINUED | OUTPATIENT
Start: 2020-01-01 | End: 2020-01-01 | Stop reason: HOSPADM

## 2020-01-01 RX ORDER — CARVEDILOL 12.5 MG/1
25 TABLET ORAL 2 TIMES DAILY WITH MEALS
Status: DISCONTINUED | OUTPATIENT
Start: 2020-01-01 | End: 2020-01-01 | Stop reason: HOSPADM

## 2020-01-01 RX ORDER — POTASSIUM CHLORIDE 750 MG/1
40 TABLET, FILM COATED, EXTENDED RELEASE ORAL EVERY 4 HOURS
Status: COMPLETED | OUTPATIENT
Start: 2020-01-01 | End: 2020-01-01

## 2020-01-01 RX ORDER — SODIUM CHLORIDE 9 MG/ML
50 INJECTION, SOLUTION INTRAVENOUS CONTINUOUS
Status: DISCONTINUED | OUTPATIENT
Start: 2020-01-01 | End: 2020-01-01 | Stop reason: HOSPADM

## 2020-01-01 RX ADMIN — Medication 40 MCG: at 13:45

## 2020-01-01 RX ADMIN — ASPIRIN 325 MG ORAL TABLET 325 MG: 325 PILL ORAL at 09:34

## 2020-01-01 RX ADMIN — DILTIAZEM HYDROCHLORIDE 7.5 MG/HR: 5 INJECTION, SOLUTION INTRAVENOUS at 18:24

## 2020-01-01 RX ADMIN — FUROSEMIDE 40 MG: 10 INJECTION, SOLUTION INTRAMUSCULAR; INTRAVENOUS at 02:33

## 2020-01-01 RX ADMIN — PROPOFOL 10 MG: 10 INJECTION, EMULSION INTRAVENOUS at 13:52

## 2020-01-01 RX ADMIN — DILTIAZEM HYDROCHLORIDE 10 MG/HR: 5 INJECTION, SOLUTION INTRAVENOUS at 23:47

## 2020-01-01 RX ADMIN — Medication 5 MG: at 14:05

## 2020-01-01 RX ADMIN — DILTIAZEM HYDROCHLORIDE 5 MG/HR: 5 INJECTION, SOLUTION INTRAVENOUS at 02:33

## 2020-01-01 RX ADMIN — Medication 40 MCG: at 13:54

## 2020-01-01 RX ADMIN — FUROSEMIDE 40 MG: 40 TABLET ORAL at 10:48

## 2020-01-01 RX ADMIN — POTASSIUM CHLORIDE 40 MEQ: 750 TABLET, FILM COATED, EXTENDED RELEASE ORAL at 18:52

## 2020-01-01 RX ADMIN — PROPOFOL 15 MG: 10 INJECTION, EMULSION INTRAVENOUS at 13:49

## 2020-01-01 RX ADMIN — ENOXAPARIN SODIUM 100 MG: 100 INJECTION SUBCUTANEOUS at 14:08

## 2020-01-01 RX ADMIN — PROPOFOL 25 MG: 10 INJECTION, EMULSION INTRAVENOUS at 13:47

## 2020-01-01 RX ADMIN — DILTIAZEM HYDROCHLORIDE 5 MG/HR: 5 INJECTION, SOLUTION INTRAVENOUS at 12:24

## 2020-01-01 RX ADMIN — PROPOFOL 10 MG: 10 INJECTION, EMULSION INTRAVENOUS at 13:55

## 2020-01-01 RX ADMIN — ATORVASTATIN CALCIUM 40 MG: 40 TABLET, FILM COATED ORAL at 21:05

## 2020-01-01 RX ADMIN — Medication 0.8 MG: at 14:01

## 2020-01-01 RX ADMIN — Medication 40 MCG: at 13:47

## 2020-01-01 RX ADMIN — ATORVASTATIN CALCIUM 40 MG: 40 TABLET, FILM COATED ORAL at 21:08

## 2020-01-01 RX ADMIN — ASPIRIN 81 MG: 81 TABLET, COATED ORAL at 08:26

## 2020-01-01 RX ADMIN — DILTIAZEM HYDROCHLORIDE 0 MG/HR: 5 INJECTION, SOLUTION INTRAVENOUS at 02:39

## 2020-01-01 RX ADMIN — CARVEDILOL 25 MG: 12.5 TABLET, FILM COATED ORAL at 10:48

## 2020-01-01 RX ADMIN — ENOXAPARIN SODIUM 100 MG: 100 INJECTION SUBCUTANEOUS at 02:32

## 2020-01-01 RX ADMIN — CARVEDILOL 25 MG: 12.5 TABLET, FILM COATED ORAL at 08:26

## 2020-01-01 RX ADMIN — METOPROLOL TARTRATE 25 MG: 25 TABLET, FILM COATED ORAL at 09:20

## 2020-01-01 RX ADMIN — PANTOPRAZOLE SODIUM 40 MG: 40 TABLET, DELAYED RELEASE ORAL at 08:10

## 2020-01-01 RX ADMIN — PROPOFOL 25 MG: 10 INJECTION, EMULSION INTRAVENOUS at 13:45

## 2020-01-01 RX ADMIN — SODIUM CHLORIDE: 900 INJECTION, SOLUTION INTRAVENOUS at 13:43

## 2020-01-01 RX ADMIN — METOPROLOL TARTRATE 50 MG: 50 TABLET, FILM COATED ORAL at 03:57

## 2020-01-01 RX ADMIN — LIDOCAINE HYDROCHLORIDE 100 MG: 20 INJECTION, SOLUTION EPIDURAL; INFILTRATION; INTRACAUDAL; PERINEURAL at 13:45

## 2020-01-01 RX ADMIN — FUROSEMIDE 40 MG: 40 TABLET ORAL at 06:56

## 2020-01-01 RX ADMIN — ATORVASTATIN CALCIUM 40 MG: 40 TABLET, FILM COATED ORAL at 02:38

## 2020-01-01 RX ADMIN — METOPROLOL TARTRATE 50 MG: 50 TABLET, FILM COATED ORAL at 09:34

## 2020-01-01 RX ADMIN — POTASSIUM CHLORIDE 40 MEQ: 750 TABLET, FILM COATED, EXTENDED RELEASE ORAL at 08:09

## 2020-01-01 RX ADMIN — DILTIAZEM HYDROCHLORIDE 10 MG/HR: 5 INJECTION, SOLUTION INTRAVENOUS at 10:00

## 2020-01-01 RX ADMIN — FUROSEMIDE 40 MG: 10 INJECTION, SOLUTION INTRAMUSCULAR; INTRAVENOUS at 18:52

## 2020-01-01 RX ADMIN — ASPIRIN 325 MG ORAL TABLET 325 MG: 325 PILL ORAL at 08:09

## 2020-01-01 RX ADMIN — RIVAROXABAN 20 MG: 20 TABLET, FILM COATED ORAL at 17:08

## 2020-01-01 RX ADMIN — METOPROLOL TARTRATE 25 MG: 25 TABLET, FILM COATED ORAL at 02:32

## 2020-01-01 RX ADMIN — FUROSEMIDE 40 MG: 40 TABLET ORAL at 17:08

## 2020-01-01 RX ADMIN — DIGOXIN 0.12 MG: 125 TABLET ORAL at 08:26

## 2020-01-01 RX ADMIN — DILTIAZEM HYDROCHLORIDE 5 MG/HR: 5 INJECTION, SOLUTION INTRAVENOUS at 17:10

## 2020-01-01 RX ADMIN — CARVEDILOL 25 MG: 12.5 TABLET, FILM COATED ORAL at 17:08

## 2020-01-01 RX ADMIN — SPIRONOLACTONE 25 MG: 25 TABLET ORAL at 14:12

## 2020-01-01 RX ADMIN — ENOXAPARIN SODIUM 100 MG: 100 INJECTION SUBCUTANEOUS at 03:57

## 2020-01-01 RX ADMIN — FUROSEMIDE 40 MG: 10 INJECTION, SOLUTION INTRAMUSCULAR; INTRAVENOUS at 09:34

## 2020-01-01 RX ADMIN — Medication 40 MCG: at 13:50

## 2020-01-01 RX ADMIN — Medication 40 MCG: at 13:58

## 2020-01-01 RX ADMIN — Medication 40 MCG: at 13:56

## 2020-01-01 RX ADMIN — DILTIAZEM HYDROCHLORIDE 5 MG/HR: 5 INJECTION, SOLUTION INTRAVENOUS at 10:21

## 2020-01-01 RX ADMIN — SPIRONOLACTONE 25 MG: 25 TABLET ORAL at 08:26

## 2020-01-01 RX ADMIN — POTASSIUM CHLORIDE 40 MEQ: 750 TABLET, FILM COATED, EXTENDED RELEASE ORAL at 11:01

## 2020-01-01 RX ADMIN — FUROSEMIDE 40 MG: 10 INJECTION, SOLUTION INTRAMUSCULAR; INTRAVENOUS at 08:11

## 2020-01-01 RX ADMIN — PANTOPRAZOLE SODIUM 40 MG: 40 TABLET, DELAYED RELEASE ORAL at 08:26

## 2020-01-01 RX ADMIN — METOPROLOL TARTRATE 25 MG: 25 TABLET, FILM COATED ORAL at 18:52

## 2020-01-01 RX ADMIN — PROPOFOL 25 MG: 10 INJECTION, EMULSION INTRAVENOUS at 13:46

## 2020-01-01 RX ADMIN — PANTOPRAZOLE SODIUM 40 MG: 40 TABLET, DELAYED RELEASE ORAL at 09:34

## 2020-01-01 RX ADMIN — ATORVASTATIN CALCIUM 40 MG: 40 TABLET, FILM COATED ORAL at 21:07

## 2020-01-01 RX ADMIN — SODIUM CHLORIDE 50 ML/HR: 900 INJECTION, SOLUTION INTRAVENOUS at 15:16

## 2020-01-01 RX ADMIN — Medication 0.4 MG: at 14:02

## 2020-10-08 PROBLEM — I48.91 RAPID ATRIAL FIBRILLATION (HCC): Status: ACTIVE | Noted: 2020-01-01

## 2020-10-08 PROBLEM — I50.9 CHF (CONGESTIVE HEART FAILURE), NYHA CLASS IV (HCC): Status: ACTIVE | Noted: 2020-01-01

## 2020-10-08 NOTE — ACP (ADVANCE CARE PLANNING)
Advance Care Planning Advance Care Planning Activator (Inpatient) Conversation Note Date of ACP Conversation: 10/08/20 Conversation Conducted with:   Patient with Decision Making Capacity ACP Activator: Erik Bell Health Care Decision Maker: 
 
Current Designated Health Care Decision Maker:   Primary Decision Maker: Corona Crossgrey Spouse - [phone] Current Advanced Directive/Advance Care Plan: No ACP documentation on file. Patient does not wish to complete ACP documentation this admission. Identified health care decision maker is patient's spouse, Mackenzie Meaduphin [phone]. JB Méndez/Critical access hospital Care Management 4:35 PM

## 2020-10-08 NOTE — PROGRESS NOTES
Transition of Care Plan: 1. TBD/subject to change pending recommendations. 
 -Anticipate home with family assistance. 2. Family able to transport at discharge. CM will continue to follow and assist with LIONEL needs as they arise. Reason for Admission:  Acute congestive heart failure, unspecified heart failure type RUR Score:     9 Plan for utilizing home health:   Not at this time. TBD/subject to change pending recommendations. PCP: YES First and Last name:  Beverley Carlisle .[phone] Name of Practice: [facility] [phone] Are you a current patient: Yes/No: YES Approximate date of last visit: Patient reports months ago. Can you participate in a virtual visit with your PCP: YES Current Advanced Directive/Advance Care Plan: No ACP documentation on file. Patient does not wish to complete ACP documentation this admission. Identified health care decision maker is patient's spouse, Sola Powell [phone]. 
 
76year old male, AOx4. Independent with ADL's and IADL's. No DME utilized. Resides with spouse in their own 2 story home with 4 steps to enter. 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 No significant financial stressors or concerns. Insurance verified: Medicare A&B/ [employer]. [facility] is utilized for prescriptions. Family able to transport at discharge. Care Management Interventions PCP Verified by CM: Yes 
Palliative Care Criteria Met (RRAT>21 & CHF Dx)?: No 
Mode of Transport at Discharge: Other (see comment)(Family) Transition of Care Consult (CM Consult): Discharge Planning, Other(Heart Failure) Discharge Durable Medical Equipment: No 
Physical Therapy Consult: No 
Occupational Therapy Consult: No 
Speech Therapy Consult: No 
Current Support Network: Lives with Spouse, Own Home Confirm Follow Up Transport: Family Discharge Location Discharge Placement: Home with family assistance(TBD/subject to change pending recommendations) JB Richards/PATRICK Care Management 4:20 PM

## 2020-10-08 NOTE — ED PROVIDER NOTES
Please note that this dictation was completed with "[employer] the computer voice recognition software.  Quite often unanticipated grammatical, syntax, homophones, and other interpretive errors are inadvertently transcribed by the computer software.  Please disregard these errors.  Please excuse any errors that have escaped final proofreading. 66-year-old male past medical history markable for aortic valvular stenosis, coronary artery disease, COPD, high cholesterol, hypertension, peripheral artery disease also s/p aortic valve replacement with porcine Medtronic valve previous four-vessel CABG presents to the ER via EMS complaining increased shortness of breath times \"since 1 AM last night. \"  Patient states that shortness of breath is gradually worsening overnight denies being on home oxygen states that then suddenly worsened this morning. Patient was brought in by EMS noted to be tripoding more comfortable on a nonrebreather. When reassessed patient was taken off of oxygen immediately became more short of breath only able to speak 1-2 word sentences. Was emergently placed on BiPAP. He denies recent illnesses fevers productive cough chest pain extremity edema. pt denies HA, vison changes, diff swallowing, CP,  Abd pain, F/Ch, N/V, D/Cons or other current systemic complaints Social/ PSH reviewed in EMR 
 
EMR Chart Reviewed Past Medical History:  
Diagnosis Date  Aortal valvular stenosis  CAD (coronary artery disease)  COPD  Hypercholesterolemia  Hypertension  PAD (peripheral artery disease) ([facility] [address] Past Surgical History:  
Procedure Laterality Date  CABG, ARTERY-VEIN, FOUR    
 HX AORTIC VALVE REPLACEMENT  10/3/07  
 mosaic ultra porcine medtronic [id number] No family history on file. Social History Socioeconomic History  Marital status:  Spouse name: Not on file  Number of children: Not on file  Years of education: Not on file  Highest education level: Not on file Occupational History  Not on file Social Needs  Financial resource strain: Not on file  Food insecurity Worry: Not on file Inability: Not on file  Transportation needs Medical: Not on file Non-medical: Not on file Tobacco Use  Smoking status: Former Smoker Last attempt to quit: 2007 Years since quittin.0  Smokeless tobacco: Never Used Substance and Sexual Activity  Alcohol use: No  
 Drug use: No  
 Sexual activity: Not on file Lifestyle  Physical activity Days per week: Not on file Minutes per session: Not on file  Stress: Not on file Relationships  Social connections Talks on phone: Not on file Gets together: Not on file Attends Buddhist service: Not on file Active member of club or organization: Not on file Attends meetings of clubs or organizations: Not on file Relationship status: Not on file  Intimate partner violence Fear of current or ex partner: Not on file Emotionally abused: Not on file Physically abused: Not on file Forced sexual activity: Not on file Other Topics Concern  Not on file Social History Narrative  Not on file ALLERGIES: Patient has no known allergies. Review of Systems Constitutional: Negative for appetite change, chills, fatigue and fever. HENT: Negative for trouble swallowing and voice change. Eyes: Negative for photophobia and visual disturbance. Respiratory: Positive for shortness of breath. Negative for stridor. Cardiovascular: Negative for chest pain, palpitations and leg swelling. Gastrointestinal: Negative for abdominal pain, constipation, diarrhea, nausea and vomiting. Genitourinary: Negative for dysuria. Musculoskeletal: Negative for back pain. Skin: Negative for rash. Neurological: Negative for facial asymmetry and speech difficulty. All other systems reviewed and are negative. Vitals:  
 10/08/20 8452 10/08/20 0820 10/08/20 0824 10/08/20 0900 BP: (!) 156/113   (!) 128/94 Pulse: (!) 136   (!) 123 Resp: (!) 35   (!) 33 Temp: 97.3 °F (36.3 °C) SpO2: 99% 100%  99% Weight:   100.6 kg (221 lb 12.5 oz) Physical Exam 
Vitals signs and nursing note reviewed. Constitutional:   
   General: He is not in acute distress. Appearance: Normal appearance. He is well-developed. He is not ill-appearing, toxic-appearing or diaphoretic. Comments: Uncomfortable appearing, AxOx4, speaking in 1-2 word  Sentences HENT:  
   Head: Normocephalic and atraumatic. Comments: Cn intact; Right Ear: External ear normal.  
   Left Ear: External ear normal.  
Eyes:  
   General:     
   Right eye: No discharge. Left eye: No discharge. Extraocular Movements: Extraocular movements intact. Conjunctiva/sclera: Conjunctivae normal.  
   Pupils: Pupils are equal, round, and reactive to light. Neck: Musculoskeletal: Normal range of motion and neck supple. No muscular tenderness. Cardiovascular:  
   Rate and Rhythm: Normal rate and regular rhythm. Pulses: Normal pulses. Heart sounds: Normal heart sounds. No murmur. No friction rub. No gallop. Pulmonary:  
   Effort: Respiratory distress present. Breath sounds: No stridor. Wheezing present. No rhonchi or rales. Chest:  
   Chest wall: No tenderness. Abdominal:  
   General: Bowel sounds are normal. There is no distension. Palpations: Abdomen is soft. There is no mass. Tenderness: There is no abdominal tenderness. There is no guarding or rebound. Comments: nttp Genitourinary: 
   Comments: Pt denies urinary/ Testicular/ scrotal or penile  complaints Musculoskeletal: Normal range of motion. General: No swelling, tenderness, deformity or signs of injury. Right lower leg: No edema. Left lower leg: No edema. Lymphadenopathy:  
   Cervical: No cervical adenopathy. Skin: 
   General: Skin is warm and dry. Capillary Refill: Capillary refill takes less than 2 seconds. Coloration: Skin is not jaundiced or pale. Findings: No bruising, erythema, lesion or rash. Neurological:  
   General: No focal deficit present. Mental Status: He is alert and oriented to person, place, and time. Cranial Nerves: No cranial nerve deficit. Sensory: No sensory deficit. Motor: No weakness. Coordination: Coordination normal.  
   Gait: Gait normal.  
   Deep Tendon Reflexes: Reflexes normal.  
   Comments: pt has motor/ CV/ Sensation grossly intact to all extremities, R = L in strength;  
 
  
 
MDM Number of Diagnoses or Management Options Acute congestive heart failure, unspecified heart failure type [facility]): Atrial fibrillation with RVR ([facility] [address]):  
Risk of Complications, Morbidity, and/or Mortality Presenting problems: high Diagnostic procedures: moderate Management options: moderate General comments: 45 min Patient Progress Patient progress: improved Procedures No chief complaint on file. 
 
 
8:06 AM 
The patients presenting problems have been discussed, and they are in agreement with the care plan formulated and outlined with them. I have encouraged them to ask questions as they arise throughout their visit. MEDICATIONS GIVEN: 
Medications - No data to display LABS REVIEWED: 
Labs Reviewed - No data to display RADIOLOGY RESULTS: 
The following have been ordered and reviewed: 
_____________________________________________________________________ 
_____________________________________________________________________ EKG interpretation:  
Rhythm: a fib/ flutter rhythm; and regular .  Rate (approx.): 120; Axis: normal; P wave: normal; QRS interval: normal ; ST/T wave: normal; Negative acute significant segmental elevations/ compared to study dated 08/20/2018 PROCEDURES: 
 
 
 
CONSULTATIONS:  
 
 
PROGRESS NOTES: 
 
 
DIAGNOSIS: 
 
1. Acute congestive heart failure, unspecified heart failure type ([facility] [address] 2. Atrial fibrillation with RVR ([facility] [address] PLAN: 
1- 
 
 
ED COURSE: The patients hospital course has been uncomplicated. 8:23 AM 
'doing better now'; on BIPAP; awaiting results;  
 
8:34 AM 
'doing better'; awaiting results 'BTW, my heart rate is always around 120';  
  
Perfect Serve Consult for Admission 9:25 AM 
 
ED Room Number: AJ15/40 Patient Name and age:  Genet Rhodes 76 y.o.  male Working Diagnosis: 1. Acute congestive heart failure, unspecified heart failure type ([facility] [address] 2. Atrial fibrillation with RVR ([facility] [address] COVID-19 Suspicion:  no 
Sepsis present:  no  Reassessment needed: no 
Code Status:  Full Code Readmission: no 
Isolation Requirements:  no 
Recommended Level of Care:  telemetry Department:[facility] Adult ED - [phone] Other:  Given lasix/ on BIPAP;

## 2020-10-08 NOTE — ED TRIAGE NOTES
Patient reports shortness of breath since last night. Arrived via EMS RR 38, 99% on room air. Denies chest pain.

## 2020-10-08 NOTE — ED NOTES
PT resting comfortably on ED stretcher. Call bell in reach. Monitor x3. Tolerating room air comfortably with no signs of respiratory distress. Denies needs at this time.

## 2020-10-08 NOTE — ED NOTES
Verbal shift change report given to Abel Caicedo RN (oncoming nurse) by Lisa Watt RN (offgoing nurse). Report included the following information SBAR, ED Summary, Intake/Output, MAR and Recent Results.

## 2020-10-08 NOTE — PROGRESS NOTES
Admission Medication Reconciliation: 
 
 
 
Spoke with wife Te Allen) by telephone @ [phone], unable to speak with patient face to face at this time due to general isolation precautions in the ED related to COVID-19 pandemic. Wife is a reliable historian. RX query is not available at this time. Interview included questions regarding use of: PTA medications including prescription/OTC, vitamins, supplements, inhaled, topical, injectable, otic and ophthalmic medications Medication changes (since last review): Added: ASA 81 mg, takes M-W-F Revised: 
Carvedilol: takes once daily (wife states BP drops too much when he takes twice daily) Deleted: 
Amiodarone Apixaban: cost issues Mucinex Thank you for allowing me to participate in the care of your patient. Evaristo Díaz PharmD, RN # [phone] ¹[employer] pharmacy benefit data reflects medications filled and processed through the patient's insurance, however  
this data does NOT capture whether the medication was picked up or is currently being taken by the patient. Allergies:  Patient has no known allergies. Significant PMH/Disease States:  
Past Medical History:  
Diagnosis Date Aortal valvular stenosis CAD (coronary artery disease) COPD Hypercholesterolemia Hypertension PAD (peripheral artery disease) ([facility] [address] Chief Complaint for this Admission: Chief Complaint Patient presents with Shortness of Breath Prior to Admission Medications:  
Prior to Admission Medications Prescriptions Last Dose Informant Taking?  
aspirin delayed-release 81 mg tablet 10/7/2020 at Unknown time  Yes Sig: Take 81 mg by mouth every Monday, Wednesday, Friday. carvedilol (COREG) 25 mg tablet 10/7/2020 at Unknown time  Yes Sig: Take 1 Tab by mouth two (2) times daily (with meals). Patient taking differently: Take 25 mg by mouth daily. furosemide (LASIX) 40 mg tablet 10/7/2020 at Unknown time Self Yes Sig: Take 1 Tab by mouth daily. pantoprazole (PROTONIX) 40 mg tablet 10/7/2020 at Unknown time Self Yes Sig: Take 40 mg by mouth daily. simvastatin (ZOCOR) 40 mg tablet 10/7/2020 at Unknown time Self Yes Sig: TAKE 1 TABLET BY MOUTH NIGHTLY Facility-Administered Medications: None Please contact the main inpatient pharmacy with any questions or concerns at [phone] and we will direct you to the clinical pharmacist covering this patient's care while in-house.   
DESIRAE Ospina

## 2020-10-08 NOTE — H&P
History & Physical 
 
Primary Care Provider: Donna Ashraf MD 
Source of Information: Patient History of Presenting Illness:  
Nahomi Lindquist is a 76 y.o. male who presents with sob 26-year-old male past medical history markable for aortic valvular stenosis, coronary artery disease, COPD, high cholesterol, hypertension, peripheral artery disease also s/p aortic valve replacement with porcine Medtronic valve previous four-vessel CABG, Afib  presents to the ER via EMS complaining increased shortness of breath times \"since 1 AM last night. \"  Patient states that shortness of breath is gradually worsening overnight denies being on home oxygen states that then suddenly worsened this morning. Patient was brought in by EMS noted to be tripoding more comfortable on a nonrebreather. When reassessed patient was taken off of oxygen immediately became more short of breath only able to speak 1-2 word sentences. Was emergently placed on BiPAP. His breathing improved in 3 hours of bipap. Currently he is off bipap. He denies recent illnesses fevers productive cough chest pain extremity edema. Denied any fever, no COVID 19 contract history. Review of Systems: 
General: HPI, no changes of weight HEENT: no headache, no vision changes, no nose discharge, no hearing changes RES: hpi  
CVS: no cp, no palpitation. Muscular: no joint swelling, no muscle pain, no leg swelling Skin: no rash, no itching GI: no vomiting, no diarrhea : no dysuria, no hematuria Hemo: no gum bleeding, no petechial  
Neuro: no sensation changes, no focal weakness Endo: no polydipsia Psych: denied depression Past Medical History:  
Diagnosis Date  Aortal valvular stenosis  CAD (coronary artery disease)  COPD  Hypercholesterolemia  Hypertension  PAD (peripheral artery disease) ([facility] [address] Past Surgical History:  
Procedure Laterality Date  CABG, ARTERY-VEIN, FOUR    
  HX AORTIC VALVE REPLACEMENT  10/3/07  
 mosaic ultra porcine medtronic [id number] Prior to Admission medications Medication Sig Start Date End Date Taking? Authorizing Provider  
aspirin delayed-release 81 mg tablet Take 81 mg by mouth every Monday, Wednesday, Friday. Yes Provider, Historical  
carvedilol (COREG) 25 mg tablet Take 1 Tab by mouth two (2) times daily (with meals). Patient taking differently: Take 25 mg by mouth daily. 8/21/18  Yes Reggie Berman MD  
pantoprazole (PROTONIX) 40 mg tablet Take 40 mg by mouth daily. Yes Other, MD Abhi  
furosemide (LASIX) 40 mg tablet Take 1 Tab by mouth daily. 3/5/16  Yes Katia Slade NP  
simvastatin (ZOCOR) 40 mg tablet TAKE 1 TABLET BY MOUTH NIGHTLY 2/23/16  Yes Sheila Roman MD  
 
No Known Allergies History reviewed. No pertinent family history. SOCIAL HISTORY: 
Patient resides: 
Independently [facility] With family care Smoking history:  
None X Former Chronic Alcohol history:  
None X Social   
Chronic Ambulates:  
Independently X  
w/cane   
w/walker   
w/wc CODE STATUS: 
DNR Full X Other Objective:  
 
Physical Exam:  
 
Visit Vitals BP (!) 128/91 Pulse (!) 121 Temp 97.3 °F (36.3 °C) Resp 23 Wt 100.6 kg (221 lb 12.5 oz) SpO2 94% BMI 32.75 kg/m² O2 Flow Rate (L/min): 50 l/min O2 Device: Room air General:  Alert, cooperative, no distress, appears stated age. Head:  Normocephalic, without obvious abnormality, atraumatic. Eyes:  Conjunctivae/corneas clear. PERRL, EOMs intact. Nose: Nares normal. Septum midline. Mucosa normal. No drainage or sinus tenderness. Throat: Lips, mucosa, and tongue normal. Teeth and gums normal.  
Neck: Supple, symmetrical, trachea midline, no adenopathy, thyroid: no enlargement/tenderness/nodules, no carotid bruit and no JVD. Back:   Symmetric, no curvature. ROM normal. No CVA tenderness. Lungs:   Basilar crackles Chest wall:  No tenderness or deformity. Heart:  irregular rate and rhythm, S1, S2 normal, no murmur, click, rub or gallop. Abdomen:   Soft, non-tender. Bowel sounds normal. No masses,  No organomegaly. Extremities: Extremities normal, atraumatic, no cyanosis or edema. Pulses: 2+ and symmetric all extremities. Skin: Skin color, texture, turgor normal. No rashes or lesions Neurologic: CNII-XII intact. No focal weakness Data Review:  
 
Recent Days: 
Recent Labs 10/08/20 
5233 WBC 14.5* HGB 15.0  
HCT 46.3  Recent Labs 10/08/20 
5205   
K 3.7  CO2 26 * BUN 19  
CREA 1.26  
CA 8.5 ALB 3.8 ALT 42 No results for input(s): PH, PCO2, PO2, HCO3, FIO2 in the last 72 hours. 24 Hour Results: 
Recent Results (from the past 24 hour(s)) TROPONIN I Collection Time: 10/08/20  8:23 AM  
Result Value Ref Range Troponin-I, Qt. 0.09 (H) <0.05 ng/mL LIPASE Collection Time: 10/08/20  8:23 AM  
Result Value Ref Range Lipase 154 73 - 393 U/L  
SAMPLES BEING HELD Collection Time: 10/08/20  8:23 AM  
Result Value Ref Range SAMPLES BEING HELD 1BLU,1RED COMMENT Add-on orders for these samples will be processed based on acceptable specimen integrity and analyte stability, which may vary by analyte. LACTIC ACID Collection Time: 10/08/20  8:23 AM  
Result Value Ref Range Lactic acid 2.8 (HH) 0.4 - 2.0 MMOL/L  
CBC WITH AUTOMATED DIFF Collection Time: 10/08/20  8:23 AM  
Result Value Ref Range WBC 14.5 (H) 4.1 - 11.1 K/uL  
 RBC 5.68 4.10 - 5.70 M/uL  
 HGB 15.0 12.1 - 17.0 g/dL HCT 46.3 36.6 - 50.3 % MCV 81.5 80.0 - 99.0 FL  
 MCH 26.4 26.0 - 34.0 PG  
 MCHC 32.4 30.0 - 36.5 g/dL  
 RDW 14.3 11.5 - 14.5 % PLATELET 206 893 - 695 K/uL NRBC 0.0 0  WBC ABSOLUTE NRBC 0.00 0.00 - 0.01 K/uL NEUTROPHILS 73 32 - 75 % LYMPHOCYTES 11 (L) 12 - 49 % MONOCYTES 8 5 - 13 % EOSINOPHILS 7 0 - 7 % BASOPHILS 1 0 - 1 % IMMATURE GRANULOCYTES 0 0.0 - 0.5 % ABS. NEUTROPHILS 10.6 (H) 1.8 - 8.0 K/UL  
 ABS. LYMPHOCYTES 1.6 0.8 - 3.5 K/UL  
 ABS. MONOCYTES 1.2 (H) 0.0 - 1.0 K/UL  
 ABS. EOSINOPHILS 1.0 (H) 0.0 - 0.4 K/UL  
 ABS. BASOPHILS 0.1 0.0 - 0.1 K/UL  
 ABS. IMM. GRANS. 0.0 0.00 - 0.04 K/UL  
 DF SMEAR SCANNED    
 RBC COMMENTS NORMOCYTIC, NORMOCHROMIC    
NT-PRO BNP Collection Time: 10/08/20  8:23 AM  
Result Value Ref Range NT pro-BNP 4,888 (H) <949 PG/ML  
METABOLIC PANEL, COMPREHENSIVE Collection Time: 10/08/20  8:23 AM  
Result Value Ref Range Sodium 138 136 - 145 mmol/L Potassium 3.7 3.5 - 5.1 mmol/L Chloride 105 97 - 108 mmol/L  
 CO2 26 21 - 32 mmol/L Anion gap 7 5 - 15 mmol/L Glucose 205 (H) 65 - 100 mg/dL BUN 19 6 - 20 MG/DL Creatinine 1.26 0.70 - 1.30 MG/DL  
 BUN/Creatinine ratio 15 12 - 20 GFR est AA >60 >60 ml/min/1.73m2 GFR est non-AA 56 (L) >60 ml/min/1.73m2 Calcium 8.5 8.5 - 10.1 MG/DL Bilirubin, total 1.0 0.2 - 1.0 MG/DL  
 ALT (SGPT) 42 12 - 78 U/L  
 AST (SGOT) 46 (H) 15 - 37 U/L Alk. phosphatase 92 45 - 117 U/L Protein, total 7.6 6.4 - 8.2 g/dL Albumin 3.8 3.5 - 5.0 g/dL Globulin 3.8 2.0 - 4.0 g/dL A-G Ratio 1.0 (L) 1.1 - 2.2 EKG, 12 LEAD, INITIAL Collection Time: 10/08/20  8:27 AM  
Result Value Ref Range Ventricular Rate 121 BPM  
 Atrial Rate 242 BPM  
 QRS Duration 120 ms  
 Q-T Interval 304 ms QTC Calculation (Bezet) 431 ms Calculated P Axis -38 degrees Calculated R Axis 1 degrees Calculated T Axis 165 degrees Diagnosis Atrial flutter Incomplete left bundle branch block ST & T wave abnormality, consider lateral ischemia When compared with ECG of 20-AUG-2018 13:03, 
Previous ECG has undetermined rhythm, needs review Incomplete left bundle branch block is now present URINALYSIS W/MICROSCOPIC Collection Time: 10/08/20 12:17 PM  
Result Value Ref Range Color DARK YELLOW Appearance CLEAR CLEAR Specific gravity 1.023 1.003 - 1.030    
 pH (UA) 6.0 5.0 - 8.0 Protein 100 (A) NEG mg/dL Glucose Negative NEG mg/dL Ketone Negative NEG mg/dL Bilirubin Negative NEG Blood Negative NEG Urobilinogen 2.0 (H) 0.2 - 1.0 EU/dL Nitrites Negative NEG Leukocyte Esterase Negative NEG    
 WBC 0-4 0 - 4 /hpf  
 RBC 0-5 0 - 5 /hpf Epithelial cells FEW FEW /lpf Bacteria Negative NEG /hpf Hyaline cast 5-10 0 - 5 /lpf URINE CULTURE HOLD SAMPLE Collection Time: 10/08/20 12:17 PM  
 Specimen: Serum Result Value Ref Range Urine culture hold Urine on hold in Microbiology dept for 2 days. If unpreserved urine is submitted, it cannot be used for addtional testing after 24 hours, recollection will be required. Imaging: Xr Chest [facility] Result Date: 10/8/2020 Impression: Mild pulmonary edema. Possible trace left pleural effusion. Assessment:  
 
Active Problems: 
  CHF (congestive heart failure), NYHA class IV ([address] (10/8/2020) Rapid atrial fibrillation ([facility] [address] (10/8/2020) Plan: 1. Rapid afib: start cardizem drip, titrate. Pt said he saw Dr. Jw Eddy in the past and later he saw Dr. Randa Salinas, but he reviewed certain test that Dr. Randa Salinas recommended so he lost follow up last 2 years. He said he was not tolerate anticoagulation including eliquis or pradaxa. He only takes asa 81mg every other day, but still has petechia intermittently. consult cardiologist. I will start him on full dose lovenox now. I changed his home coreg to metoprolol. 2. Positive troponin: acs vs demanding ischemia from rapid afib and chf. Trending CE. Repeat echo. Asa, metoprolol. 3. Acute respiratory failure due to pulmonary edema/acute on chronic CHF: previous ef was ok. Increase lasix to 40mg bid and repeat echo. 4. HTN: will monitor bp Signed By: Gopi White MD   
 October 8, 2020

## 2020-10-09 NOTE — PROGRESS NOTES
[address] [facility]  Hospitalist Group Hospitalist Progress Note Vonnie Veloz MD 
Answering service: [phone] OR [phone] from in house phone Date of Service:  10/9/2020 NAME:  Maribel Fernandez :  1945 MRN:  [id number] Admission Summary: A 51-year-old male past medical history markable for aortic valvular stenosis, coronary artery disease, COPD, high cholesterol, hypertension, peripheral artery disease also s/p aortic valve replacement with porcine Medtronic valve previous four-vessel CABG, Afib  presents to the ER via EMS complaining increased shortness of breath times \"since 1 AM last night. \" Virgle Patella states that shortness of breath is gradually worsening overnight denies being on home oxygen states that then suddenly worsened this morning.  Patient was brought in by EMS noted to be tripoding more comfortable on a nonrebreather.  When reassessed patient was taken off of oxygen immediately became more short of breath only able to speak 1-2 word sentences.  Was emergently placed on BiPAP.  His breathing improved in 3 hours of bipap. Currently he is off bipap. He denies recent illnesses fevers productive cough chest pain extremity edema. Denied any fever, no COVID 19 contract history. Interval history / Subjective: He said he feels better, no chest pain or shortness of breath, no cough or wheezing Assessment & Plan: A Flutter with RVR  
-improved and off cardizem gtt, received metoprolol po, now RVR in 120 s, he said his HR runs always in 120s at home, may restart cardizem gtt 
-continue aspirin and lovenox 1 mg/kg sc q 12 
-he said Eliquis makes him sick and doesn't want to take it, had history of bruising with eliquis 
-cardiologist is consulted Elevated troponin, hx of CAD s/p CABG 
-troponin 0.09 x 2, no left side chest pain, possible related to a flutter with RVR 
-EKG atrial flutter vent rate 121 incomplete left bundle branch block, non specific st t wave 
-continue aspirin, metoprolol, lipitor and lovenox 
-cardiologist is consulted Acute hypoxic respiratory failure due to pulmonary edema 
-off BiPAP, SpO2 93-98% on RA 
-elevated porbnp 
-CXR mild pulmonary edema. Possible trace left pleural effusion. 
-continue lasix 40 mg iv q 12 
-follow up on Echo  
-cardiologist consulted Leukocytosis  
-afebrile 
-UA unremarkable 
-lactic acid normal 
-repeat cbc in am 
 
Hx of AS s/p bioprosthetic AVR 
-stable HTN 
-BP normal, continue metoprolol, lasix, monitor BP Hx of COPD  
-not on bronchospastic 
-add prn duo neb, monitor pulse ox Hx of PAD   
-continue aspirin and lipitor Hx of hypercholesterolemia  
-continue lipitor Code status: Full Code DVT prophylaxis: Lovenox Care Plan discussed with: Patient/Family, Nurse and  Anticipated Disposition: Home w/Family Anticipated Discharge: 24 hours to 48 hours Hospital Problems  Date Reviewed: 8/21/2018 Codes Class Noted POA  
 CHF (congestive heart failure), NY[facility] class IV [facility] ICD-10-CM: I50.9 ICD-9-CM: 428.0  10/8/2020 Unknown Rapid atrial fibrillation [facility]) ICD-10-CM: I48.91 
ICD-9-CM: 427.31  10/8/2020 Unknown Vital Signs:  
 Last 24hrs VS reviewed since prior progress note. Most recent are: 
Visit Vitals /78 (BP 1 Location: Left arm, BP Patient Position: At rest) Pulse (!) 105 Temp 98.2 °F (36.8 °C) Resp 16 Wt 100.6 kg (221 lb 12.5 oz) SpO2 98% BMI 32.75 kg/m² Intake/Output Summary (Last 24 hours) at 10/9/2020 7210 Last data filed at 10/8/2020 1700 Gross per 24 hour Intake  Output 1500 ml Net -1500 ml Physical Examination:  
 
 
     
Constitutional:  No acute distress, cooperative, pleasant ENT:  Oral mucosa moist, oropharynx benign. Resp:  Decrease bronchial breath sound bilaterally.  No wheezing/rhonchi/rales. No accessory muscle use CV:  Regular rhythm, tachycardia, no murmurs, gallops, rubs GI:  Soft, non distended, non tender. normoactive bowel sounds, no hepatosplenomegaly Musculoskeletal:  No edema Neurologic:  Moves all extremities. AAOx3, CN II-XII reviewed Skin:  Good turgor, no rashes or ulcers Data Review:  
 Review and/or order of clinical lab test 
Review and/or order of tests in the radiology section of CPT Review and/or order of tests in the medicine section of CPT Labs:  
 
Recent Labs 10/08/20 
5890 WBC 14.5* HGB 15.0  
HCT 46.3  Recent Labs 10/09/20 
6479 10/08/20 
6727  138  
K 2.8* 3.7  105 CO2 31 26 BUN 15 19 CREA 1.02 1.26  
GLU 94 205* CA 8.4* 8.5 MG 1.6  --   
PHOS 2.1*  --   
 
Recent Labs 10/08/20 
7506 ALT 42 AP 92 TBILI 1.0 TP 7.6 ALB 3.8 GLOB 3.8 LPSE 154 No results for input(s): INR, PTP, APTT, INREXT, INREXT in the last 72 hours. No results for input(s): FE, TIBC, PSAT, FERR in the last 72 hours. No results found for: FOL, RBCF No results for input(s): PH, PCO2, PO2 in the last 72 hours. Recent Labs 10/09/20 
9173 10/08/20 
5382 TROIQ 0.09* 0.09* Lab Results Component Value Date/Time Cholesterol, total 143 07/06/2015 12:13 PM  
 HDL Cholesterol 28 (L) 07/06/2015 12:13 PM  
 LDL, calculated 72 07/06/2015 12:13 PM  
 Triglyceride 216 (H) 07/06/2015 12:13 PM  
 
No results found for: Rice Ino Lab Results Component Value Date/Time  Color DARK YELLOW 10/08/2020 12:17 PM  
 Appearance CLEAR 10/08/2020 12:17 PM  
 Specific gravity 1.023 10/08/2020 12:17 PM  
 pH (UA) 6.0 10/08/2020 12:17 PM  
 Protein 100 (A) 10/08/2020 12:17 PM  
 Glucose Negative 10/08/2020 12:17 PM  
 Ketone Negative 10/08/2020 12:17 PM  
 Bilirubin Negative 10/08/2020 12:17 PM  
 Urobilinogen 2.0 (H) 10/08/2020 12:17 PM  
 Nitrites Negative 10/08/2020 12:17 PM  
 Leukocyte Esterase Negative 10/08/2020 12:17 PM  
 Epithelial cells FEW 10/08/2020 12:17 PM  
 Bacteria Negative 10/08/2020 12:17 PM  
 WBC 0-4 10/08/2020 12:17 PM  
 RBC 0-5 10/08/2020 12:17 PM  
 
 
 
Medications Reviewed:  
 
Current Facility-Administered Medications Medication Dose Route Frequency  furosemide (LASIX) injection 40 mg  40 mg IntraVENous BID  aspirin tablet 325 mg  325 mg Oral DAILY  pantoprazole (PROTONIX) tablet 40 mg  40 mg Oral DAILY  atorvastatin (LIPITOR) tablet 40 mg  40 mg Oral QHS  metoprolol tartrate (LOPRESSOR) tablet 25 mg  25 mg Oral Q8H  
 enoxaparin (LOVENOX) injection 100 mg  1 mg/kg SubCUTAneous Q12H  potassium chloride SR (KLOR-CON 10) tablet 40 mEq  40 mEq Oral Q4H  
 albuterol-ipratropium (DUO-NEB) 2.5 MG-0.5 MG/3 ML  3 mL Nebulization Q4H PRN  
 dilTIAZem (CARDIZEM) 125 mg in dextrose 5% 125 mL infusion  0-15 mg/hr IntraVENous TITRATE Current Outpatient Medications Medication Sig  
 aspirin delayed-release 81 mg tablet Take 81 mg by mouth every Monday, Wednesday, Friday.  carvedilol (COREG) 25 mg tablet Take 1 Tab by mouth two (2) times daily (with meals). (Patient taking differently: Take 25 mg by mouth daily.)  pantoprazole (PROTONIX) 40 mg tablet Take 40 mg by mouth daily.  furosemide (LASIX) 40 mg tablet Take 1 Tab by mouth daily.  simvastatin (ZOCOR) 40 mg tablet TAKE 1 TABLET BY MOUTH NIGHTLY  
 
______________________________________________________________________ EXPECTED LENGTH OF STAY: - - - 
ACTUAL LENGTH OF STAY:          1 Yue Cody MD

## 2020-10-09 NOTE — ED NOTES
Verbal shift change report given to Kailyn Almaraz (oncoming nurse) by Norma Stroud RN (offgoing nurse). Report included the following information SBAR, ED Summary, Procedure Summary, Intake/Output, MAR and Recent Results.

## 2020-10-09 NOTE — ED NOTES
Discussed delay in bed assignment with the Pt. Pt has significant concerns about delay in bed being assigned, concerned about lack of sleep, increased anxiety from being \"stuck in the room\". Called Bed board to inquire about bed, informed he is awaiting a [facility] bed. Will update the Pt on bed status.

## 2020-10-09 NOTE — PROGRESS NOTES
Bedside shift change report given to [address] (oncoming nurse) by Chang Stewart (offgoing nurse). Report included the following information SBAR, Kardex, ED Summary, Intake/Output, Accordion and Recent Results. Problem: Afib Pathway: Day 1 Goal: Treatments/Interventions/Procedures Outcome: Progressing Towards Goal 
  
 
Pt HR Afib/Aflutter 's. Pt on  Cardizem gtt. Last troponin 0.09.  Not rechecking troponin per MD.

## 2020-10-09 NOTE — ED NOTES
Assumed care of pt who is resting on stretcher with family in the room. Pt denies shortness of breath or pain. Moniotr x 3. Call bell in reach.

## 2020-10-09 NOTE — PROGRESS NOTES
1525: TRANSFER - IN REPORT: 
 
Verbal report received from Jhony(name) on Nancy Frederick  being received from ED(unit) for routine progression of care Report consisted of patients Situation, Background, Assessment and  
Recommendations(SBAR). Information from the following report(s) SBAR, Kardex, MAR, Recent Results and Cardiac Rhythm A fib was reviewed with the receiving nurse. Opportunity for questions and clarification was provided. Assessment completed upon patients arrival to unit and care assumed. 1930: Bedside shift change report given to Fransisco Sellers (oncoming nurse) by Matidle Allen (offgoing nurse). Report included the following information SBAR, Kardex, MAR, Recent Results and Cardiac Rhythm A fib.

## 2020-10-09 NOTE — NURSE NAVIGATOR
Chart reviewed by Heart Failure Nurse Navigator. Heart Failure database completed. EF:  Pending. Prior EF 50% 8/21/18 ACEi/ARB/ARNi: Echo pending BB: echo pending. On Coreg prior to admission Aldosterone Antagonist: echo pending Obstructive Sleep Apnea Screening: N/2 
 STOP-BANG score: 
 Referred to Sleep Medicine: CRT not indicated NYHA Functional Class IV Heart Failure Teach Back in Patient Education. Heart Failure Avoiding Triggers on Discharge Instructions. Cardiologist: [facility] Post discharge follow up phone call to be made within 48-72 hours of discharge.

## 2020-10-09 NOTE — ED NOTES
Verbal shift change report given to Garima Frank RN (oncoming nurse) by Hugh East RN (offgoing nurse). Report included the following information SBAR, ED Summary, Intake/Output, MAR and Recent Results.

## 2020-10-09 NOTE — ED NOTES
Bedside and Verbal shift change report given to Lyla White (oncoming nurse) by Lelo Urias RN (offgoing nurse). Report included the following information SBAR, ED Summary and Recent Results.

## 2020-10-10 NOTE — CONSULTS
Cardiology Note # [id number] Imp: 1. Atrial flutter with variable AV block. CHADS-VASC 4. This has likely been going on for several months according to his HR monitor at home, according to his daughter who is a nurse. 2. 13 years s/p AVR ( bioprosthesis) with CABG x 5 
3. No recent cardiology evaluation. The patient was last seen in our practice in Aug 2018 4. His chest xray shows pulmonary edema. Suspect the patient has developed LV systolic HF or prosthetic valve stenosis Recommendations: 
Increase his BB Continue diltiazem gtt Increase diuretics if pulmonary edema isn't resolving BARBARA/DCCV on Monday Thank you for this referral.  
Joyce Kirby MD 
Interventional Cardiology [facility]

## 2020-10-10 NOTE — PROGRESS NOTES
[facility]  Hospitalist Group Hospitalist Progress Note Amarilis Morales MD 
Answering service: [phone] OR [phone] from in house phone Date of Service:  10/10/2020 NAME:  Deon Baeza :  1945 MRN:  [id number] Admission Summary: A 54-year-old male past medical history markable for aortic valvular stenosis, coronary artery disease, COPD, high cholesterol, hypertension, peripheral artery disease also s/p aortic valve replacement with porcine Med[employer] valve previous four-vessel CABG, Afib  presents to the ER via EMS complaining increased shortness of breath times \"since 1 AM last night. \" Steffanie Banks states that shortness of breath is gradually worsening overnight denies being on home oxygen states that then suddenly worsened this morning.  Patient was brought in by EMS noted to be tripoding more comfortable on a nonrebreather.  When reassessed patient was taken off of oxygen immediately became more short of breath only able to speak 1-2 word sentences.  Was emergently placed on BiPAP.  His breathing improved in 3 hours of bipap. Currently he is off bipap. He denies recent illnesses fevers productive cough chest pain extremity edema. Denied any fever, no COVID 19 contract history. Interval history / Subjective: He said he feels better, no chest pain or shortness of breath, no cough or wheezing Assessment & Plan: A Flutter with RVR, now NSR 
-improved, on cardizem gtt, increased metoprolol 50 mg tid 
-aspirin 325 mg and lovenox 1 mg/kg sc q 12 
-he said Eliquis makes him sick and doesn't want to take it, had history of bruising with eliquis 
-cardiologist is on board, plan for possible cardioversion 10/12 as an outpatient Elevated troponin, hx of CAD s/p CABG 
-troponin 0.09 x 2,  
-no left side chest pain, possible related to a flutter with RVR 
 -EKG atrial flutter vent rate 121 incomplete left bundle branch block, non specific st t wave 
-continue aspirin, metoprolol, lipitor and lovenox 
-cardiologist is consulted Acute hypoxic respiratory failure due to pulmonary edema, acute systolic CHF 
-off BiPAP, SpO2 93-98% on RA 
-elevated porbnp 
-CXR mild pulmonary edema. Possible trace left pleural effusion. 
-continue lasix 40 mg iv q 12, metoprolol,  
-BP low normal, not on ACEi/ARB   
- Echo LV EF 15-20% normal wall thickness, moderately dilated left ventricle, severely reduced systolic function 
-cardiologist consulted Leukocytosis  
-afebrile 
-UA unremarkable 
-lactic acid normal 
-leukocytosis resolved Hx of AS s/p bioprosthetic AVR 
-stable HTN 
-BP normal, continue metoprolol, lasix, monitor BP Hx of COPD  
-not on bronchospastic 
-continue prn duo neb, monitor pulse ox Hx of PAD   
-continue aspirin and lipitor Hx of hypercholesterolemia  
-continue lipitor Code status: Full Code DVT prophylaxis: Lovenox Care Plan discussed with: Patient/Family, Nurse and  Anticipated Disposition: Home w/Family Anticipated Discharge: 24 hours to 48 hours Hospital Problems  Date Reviewed: 8/21/2018 Codes Class Noted POA  
 CHF (congestive heart failure), [facility] ICD-10-CM: I50.9 ICD-9-CM: 428.0  10/8/2020 Unknown Rapid atrial fibrillation [facility]) ICD-10-CM: I48.91 
ICD-9-CM: 427.31  10/8/2020 Unknown Vital Signs:  
 Last 24hrs VS reviewed since prior progress note. Most recent are: 
Visit Vitals BP (!) 104/59 (BP 1 Location: Left arm, BP Patient Position: Sitting) Pulse 64 Temp 97.8 °F (36.6 °C) Resp 18 Ht 5' 9\" (1.753 m) Wt 95.7 kg (211 lb) SpO2 97% BMI 31.16 kg/m² Intake/Output Summary (Last 24 hours) at 10/10/2020 1238 Last data filed at 10/10/2020 0400 Gross per 24 hour Intake 1134.04 ml Output 300 ml Net 834.04 ml Physical Examination: Constitutional:  No acute distress, cooperative, pleasant ENT:  Oral mucosa moist, oropharynx benign. Resp:  Decrease bronchial breath sound bilaterally. No wheezing/rhonchi/rales. No accessory muscle use CV:  Regular rhythm, tachycardia, no murmurs, gallops, rubs GI:  Soft, non distended, non tender. normoactive bowel sounds, no hepatosplenomegaly Musculoskeletal:  No edema Neurologic:  Moves all extremities. AAOx3, CN II-XII reviewed Skin:  Good turgor, no rashes or ulcers Data Review:  
 Review and/or order of clinical lab test 
Review and/or order of tests in the radiology section of CPT Review and/or order of tests in the medicine section of CPT Labs:  
 
Recent Labs 10/10/20 
7192 10/08/20 
7683 WBC 10.8 14.5* HGB 15.5 15.0  
HCT 47.5 46.3  155 Recent Labs 10/10/20 
1066 10/09/20 
0445 10/08/20 
8906  139 138  
K 3.5 2.8* 3.7  103 105 CO2 27 31 26 BUN 19 15 19 CREA 1.14 1.02 1.26  
* 94 205* CA 9.1 8.4* 8.5 MG  --  1.6  --   
PHOS  --  2.1*  --   
 
Recent Labs 10/10/20 
6410 10/08/20 
5870 ALT 35 42 AP 85 92 TBILI 2.1* 1.0 TP 8.0 7.6 ALB 4.1 3.8 GLOB 3.9 3.8 LPSE  --  154 No results for input(s): INR, PTP, APTT, INREXT, INREXT in the last 72 hours. No results for input(s): FE, TIBC, PSAT, FERR in the last 72 hours. No results found for: FOL, RBCF No results for input(s): PH, PCO2, PO2 in the last 72 hours. Recent Labs 10/09/20 
7834 10/08/20 
8923 TROIQ 0.09* 0.09* Lab Results Component Value Date/Time Cholesterol, total 143 07/06/2015 12:13 PM  
 HDL Cholesterol 28 (L) 07/06/2015 12:13 PM  
 LDL, calculated 72 07/06/2015 12:13 PM  
 Triglyceride 216 (H) 07/06/2015 12:13 PM  
 
No results found for: Julio Ward Lab Results Component Value Date/Time  Color DARK YELLOW 10/08/2020 12:17 PM  
 Appearance CLEAR 10/08/2020 12:17 PM  
 Specific gravity 1.023 10/08/2020 12:17 PM  
 pH (UA) 6.0 10/08/2020 12:17 PM  
 Protein 100 (A) 10/08/2020 12:17 PM  
 Glucose Negative 10/08/2020 12:17 PM  
 Ketone Negative 10/08/2020 12:17 PM  
 Bilirubin Negative 10/08/2020 12:17 PM  
 Urobilinogen 2.0 (H) 10/08/2020 12:17 PM  
 Nitrites Negative 10/08/2020 12:17 PM  
 Leukocyte Esterase Negative 10/08/2020 12:17 PM  
 Epithelial cells FEW 10/08/2020 12:17 PM  
 Bacteria Negative 10/08/2020 12:17 PM  
 WBC 0-4 10/08/2020 12:17 PM  
 RBC 0-5 10/08/2020 12:17 PM  
 
 
 
Medications Reviewed:  
 
Current Facility-Administered Medications Medication Dose Route Frequency  furosemide (LASIX) injection 40 mg  40 mg IntraVENous BID  aspirin tablet 325 mg  325 mg Oral DAILY  pantoprazole (PROTONIX) tablet 40 mg  40 mg Oral DAILY  atorvastatin (LIPITOR) tablet 40 mg  40 mg Oral QHS  enoxaparin (LOVENOX) injection 100 mg  1 mg/kg SubCUTAneous Q12H  
 albuterol-ipratropium (DUO-NEB) 2.5 MG-0.5 MG/3 ML  3 mL Nebulization Q4H PRN  
 metoprolol tartrate (LOPRESSOR) tablet 50 mg  50 mg Oral Q8H  
 dilTIAZem (CARDIZEM) 125 mg in dextrose 5% 125 mL infusion  0-15 mg/hr IntraVENous TITRATE  
 
______________________________________________________________________ EXPECTED LENGTH OF STAY: - - - 
ACTUAL LENGTH OF STAY:          2 Ricki Alfaro MD

## 2020-10-10 NOTE — PROGRESS NOTES
1930  Report received from [facility]. 2330  Patient is upset that he was awakened for VS and Cardizem bag change. Refused to allow nurse to take temp and O2 sat. Refusing serial BP monitoring for the Cardizem drip. States, \"I have not had any sleep in 3 nights. Just leave me alone. \"  Nurse attempted to explain the importance of monitoring his BP and he still will not allow it. Pt also stated if someone walked into his house like that, he would shoot them because he has a a gun on his night stand. I said, \"Well I am glad you do not have one here. \"  Patient then stated, \"Let's not discuss that. \"  I asked the patient if he had a gun here. He stated,  \"no. \"  Reported this to the charge nurse, Joey Alcala. Joey Alcala notified the nursing supervisor and security. Both came to the unit. A  spoke with the patient and searched the patient's room. The  stated that no gun was found and it was safe for staff to go into the room. Maria Ines Marx NP on call for the hospitalist paged to report patient noncompliance. No new orders. 6830  Patient does not like the \"looks of\"  The IV in his right AC. It has leaked a scant amount of blood but functions very well. No pain, redness, or swelling. IV removed per patient request.   
 
4784  Patient removed his cardiac monitor leads and turned off his Cardizem drip. He stated he was all tangled in the monitor lead wires and that he accidentally bumped the power button on the IV pump. I reapplied tele leads and reprogrammed the IV pump.

## 2020-10-10 NOTE — PROGRESS NOTES
Cardiology Progress Note 10/10/2020     Admit Date: 10/8/2020 Admit Diagnosis: CHF (congestive heart failure), NYHA class IV ([facility] [I50.9] Rapid atrial fibrillation (HCC) [I48.91]  CC: none currently Assessment/Plan:  
HR is slower and breathing is much better. Remains in AF. Decreased pulmonary edema on CXR. Echo shows: dilated LV with severe LV systolic function, EF 16%; at least moderate prosthetic valve aortic stenosis (the low flow may underestimate the degree of AS) Discussed with patient the need to try and convert him to sinus rhythm. This could potentially improve his EF significantly. I explained to him that he would need to be committed to lifelong [address]. Will also need cath to assess his CAD and aortic valve. He is willing to proceed but would like to go home and come back as an outpatient early next week. This is reasonable. Will discontinue his diltiazem gtt, back on po BB and digoxin (po dilt contraindicated with his low EF) If his HR doesn't accelerate when the IV dilt is stopped, he can go home tomorrow morning and come back for procedures. For other plans, see orders. Subjective: Cammy Tanner reports Chest Pain:  [x]  none;  consistent with []  non-cardiac  []  atypical  []  angina [x]  none now    []  on-going Dyspnea: [x]  none    []  at rest    []  with exertion   []  improved    []  unchanged    []  worsening PND:       [x]  none      []  overnight Orthopnea:   [x]  none        []  improved         []  unchanged        []  worsening Presyncope: [x]  none        []  improved         []  unchanged        []  worsening Ambulated in hallway without symptoms  []  Yes Ambulated in room without symptoms  []  Yes Objective:  
 Physical Exam: 
Overall VSSAF;   
Visit Vitals BP (!) 104/59 (BP 1 Location: Left arm, BP Patient Position: Sitting) Pulse 64 Temp 97.8 °F (36.6 °C) Resp 18 Ht 175.3 cm (69\") Wt 95.7 kg (211 lb) SpO2 97% BMI 31.16 kg/m² Temp (24hrs), Av °F (36.7 °C), Min:97.8 °F (36.6 °C), Max:98.3 °F (36.8 °C) Patient Vitals for the past 8 hrs: 
 Pulse 10/10/20 1129 64  
10/10/20 0746 64 Patient Vitals for the past 8 hrs: 
 Resp 10/10/20 1129 18  
10/10/20 0746 18 Patient Vitals for the past 8 hrs: 
 BP  
10/10/20 1129 (!) 104/59  
10/10/20 1058 121/64  
10/10/20 0746 121/64 Intake/Output Summary (Last 24 hours) at 10/10/2020 1334 Last data filed at 10/10/2020 1129 Gross per 24 hour Intake 1688.88 ml Output 300 ml Net 1388.88 ml General Appearance: Well developed, well nourished, no acute distress. Ears/Nose/Mouth/Throat:   Normal MM; anicteric. JVP: WNL Resp:   Lungs clear to auscultation bilaterally. Nl resp effort. Cardiovascular:  irregularly irregular rhythm, S1, S2 normal, no new murmur. No gallop or rub. Abdomen:   Soft, non-tender, bowel sounds are present. Extremities: No edema bilaterally. Skin: 
Neuro: Warm and dry. A/O x3, grossly nonfocal  
 []  cath site intact w/o hematoma or bruit; distal pulse unchanged. Data Review:    
Telemetry independently reviewed : []  sinus      []  chronic afib     []  par afib    [x]  AF 
ECG independently reviewed:  []  NSR         []  no significant changes 
[] no new ECG provided for review Lab results reviewed as noted below. Current medications reviewed as noted below. No results for input(s): PH, PCO2, PO2 in the last 72 hours. Recent Labs 10/09/20 
2164 10/08/20 
9375 TROIQ 0.09* 0.09* Recent Labs 10/10/20 
0452 10/09/20 
0445 10/08/20 
2498  139 138  
K 3.5 2.8* 3.7  103 105 CO2 27 31 26 BUN 19 15 19 CREA 1.14 1.02 1.26  
* 94 205* PHOS  --  2.1*  --   
CA 9.1 8.4* 8.5 ALB 4.1  --  3.8 WBC 10.8  --  14.5* HGB 15.5  --  15.0  
HCT 47.5  --  46.3   --  155 Recent Labs 10/10/20 
9351 10/08/20 
1795 ALT 35 42 AP 85 92 TBILI 2.1* 1.0 TP 8.0 7.6 ALB 4.1 3.8 GLOB 3.9 3.8 LPSE  --  154 No results for input(s): INR, PTP, APTT, INREXT in the last 72 hours. No results for input(s): FE, TIBC, PSAT, FERR in the last 72 hours. No results found for: [facility] Current Facility-Administered Medications Medication Dose Route Frequency  rivaroxaban (XARELTO) tablet 20 mg  20 mg Oral DAILY WITH DINNER  carvediloL (COREG) tablet 25 mg  25 mg Oral BID WITH MEALS  
 [START ON 10/11/2020] digoxin (LANOXIN) tablet 0.125 mg  0.125 mg Oral DAILY  [START ON 10/11/2020] aspirin delayed-release tablet 81 mg  81 mg Oral DAILY  furosemide (LASIX) tablet 40 mg  40 mg Oral ACB&D  
 spironolactone (ALDACTONE) tablet 25 mg  25 mg Oral DAILY  pantoprazole (PROTONIX) tablet 40 mg  40 mg Oral DAILY  atorvastatin (LIPITOR) tablet 40 mg  40 mg Oral QHS  albuterol-ipratropium (DUO-NEB) 2.5 MG-0.5 MG/3 ML  3 mL Nebulization Q4H PRN Brianna Macedo MD

## 2020-10-10 NOTE — PROGRESS NOTES
Problem: Falls - Risk of 
Goal: *Absence of Falls Description: Document Shaw Rush Fall Risk and appropriate interventions in the flowsheet. Outcome: Progressing Towards Goal 
Note: Fall Risk Interventions: 
  
 
  
 
Medication Interventions: Patient to call before getting OOB 
 
  
 
 1930 Bedside shift change report given to estelle meadows rn (oncoming nurse) by Cheryle Lathe rn (offgoing nurse). Report included the following information SBAR, Kardex, ED Summary, Med Rec Status and Cardiac Rhythm aflutter.

## 2020-10-10 NOTE — PROGRESS NOTES
Problem: Falls - Risk of 
Goal: *Absence of Falls Description: Document Duyen Urias Fall Risk and appropriate interventions in the flowsheet. Outcome: Progressing Towards Goal 
Note: Fall Risk Interventions: 
  
 
  
 
Medication Interventions: Teach patient to arise slowly

## 2020-10-10 NOTE — CONSULTS
[facility]  Kavon [facility] 
MR#:  [id number] :  1945 ACCOUNT #:  [de-identified] DATE OF SERVICE:  10/09/2020 REFERRING PHYSICIAN:  Nikhil Das MD 
 
HISTORY OF PRESENT ILLNESS:  This patient presented to the hospital with shortness of breath. He was noted to be in atrial flutter and his x-ray showed pulmonary edema. The patient relates that he had a bioprosthetic aortic valve replacement with CABG x 5 13 years ago. He was last seen by this practice in 2018. He indicates that he has been doing well during that time. He has a watch with a  heart rate monitor of some kind, and his daughter is a nurse and she indicates that his heart rate has been fast in the 120's for, at least, several months. The thing that is different is the dyspnea of recent onset. He was noted to have pulmonary edema on his admission chest xray. He denies any chest pain. He denies any orthopnea, paroxysmal nocturnal dyspnea, or diaphoresis. He denies any unplanned weight loss or any significant weight gain. He works full time as a . MEDICATIONS:  His medications at home included, 
1. Aspirin three times a week. 2.  Carvedilol 25 mg twice a day. 3.  Furosemide 40 mg once a day. 4.  Pantoprazole. 5.  Simvastatin. ALLERGIES:  NO KNOWN DRUG ALLERGIES. REVIEW OF SYSTEMS:  Otherwise, unremarkable. PHYSICAL EXAMINATION: 
GENERAL:  A well-developed, pleasant gentleman. VITAL SIGNS:  Current heart rate is 120, blood pressure is 127/94, saturation 94% on room air. HEENT:  Unremarkable. NECK:  Supple. No adenopathy. CHEST:  Chest wall barrel-shaped, healed sternotomy. LUNGS:  Decreased breath sounds, clear. HEART:  Fairly regular, tachycardia. No S3, gallop, or friction rub. No thrills, lifts, or heaves. ABDOMEN:  Obese, nontender. No bruits. EXTREMITIES:  No edema. Normal pedal pulses. NEUROLOGIC:  The patient is awake, alert, and appropriate. DIAGNOSTIC DATA:  Chest x-ray demonstrates mild pulmonary edema, possible left pleural effusion. An echocardiogram has been ordered and not yet done. LABORATORY DATA:  Included an NT-proBNP of 4888, troponin 0.09, potassium is 2.8 this morning, magnesium is 2.1. Potassium supplements were given after this. DIAGNOSTIC DATA:  His EKG demonstrates atrial flutter, complete left bundle branch block, ST-T wave changes, inducible ischemia. IMPRESSION: 
1. Atrial flutter. I suspect this has been ongoing for sometime. The patient's CHADs-VASc score is 4. 
2.  The patient is status post remote bioprosthetic aortic valve replacement,13 years ago with bypass. 3.  He has mild pulmonary edema, which explains his dyspnea. RECOMMENDATIONS: 
1. Increase the beta blockers that are being used to supplement the diltiazem drip. 2.  An echocardiogram was ordered; hopefully, this will be done tomorrow.' 3.  I recommend to the patient and his daughter who was listening on the phone, that the patient have BARBARA and  cardioversion on Monday. He is hemodynamically stable, and at this point, does not require this over the weekend. We will reassess based on his clinical status. Finally, he will be a good candidate for an atrial flutter ablation sooner rather than later. Thank you for this referral. 
 
 
Vonnie Palomino MD 
 
 
[id number] 
D:  10/09/2020 20:49 T:  10/10/2020 2:36 JOB #:  [id number] CC:   Ashwini Garcia MD

## 2020-10-11 NOTE — ANCILLARY DISCHARGE INSTRUCTIONS
[address] [facility].  
 
 
10/11/2020 RE: Zoya Webb To Whom it May Concern: This is to certify that Zoya Webb was admitted to hospital on 10/8/2020 
 to 10/11/2020. He may return to work in a week. Thank you for your assistance in this matter. Sincerely, Judd Bass MD  
[address] [facility] 
[phone]

## 2020-10-11 NOTE — DISCHARGE SUMMARY
Discharge Summary PATIENT ID: Dinah Mathis MRN: [id number] YOB: 1945 DATE OF ADMISSION: 10/8/2020  8:04 AM   
DATE OF DISCHARGE: 10/11/2020 PRIMARY CARE PROVIDER: Lianne Marquez MD  
 
ATTENDING PHYSICIAN: Mar To MD  
DISCHARGING PROVIDER: Janell Draper MD   
To contact this individual call [phone] and ask the  to page. If unavailable ask to be transferred the Adult Hospitalist Department. CONSULTATIONS: IP CONSULT TO CARDIOLOGY PROCEDURES/SURGERIES: * No surgery found * [address] COURSE:  
 
A 43-year-old male past medical history markable for aortic valvular stenosis, coronary artery disease, COPD, high cholesterol, hypertension, peripheral artery disease also s/p aortic valve replacement with porcine Medtronic valve previous four-vessel CABG, Afib  presents to the ER via EMS complaining increased shortness of breath times \"since 1 AM last night. \" Ana Wing states that shortness of breath is gradually worsening overnight denies being on home oxygen states that then suddenly worsened this morning.  Patient was brought in by EMS noted to be tripoding more comfortable on a nonrebreather.  When reassessed patient was taken off of oxygen immediately became more short of breath only able to speak 1-2 word sentences.  Was emergently placed on BiPAP.  His breathing improved in 3 hours of bipap. Currently he is off bipap. He denies recent illnesses fevers productive cough chest pain extremity edema.  
Denied any fever, no COVID 19 contract history.  A Flutter with RVR, now NSR 
-improved, off cardizem gtt,   
-Continue coreg 25 mg bid, xarelto 20 mg daily, digoxin 250 mcg daily  
-cardiologist is on board, plan for possible cardioversion as an outpatient Elevated troponin, hx of CAD s/p CABG 
-troponin 0.09 x 2,  
-no left side chest pain, possible related to a flutter with RVR 
 -EKG atrial flutter vent rate 121 incomplete left bundle branch block, non specific st t wave 
-medication optimized to coreg, xarelto and lipitor  
-cardiologist on board Acute hypoxic respiratory failure due to pulmonary edema, acute systolic CHF NYHA Class IV on admission, now Class III 
-off BiPAP, SpO2 93-98% on RA 
-elevated porbnp 
-CXR mild pulmonary edema. Possible trace left pleural effusion. 
-continue lasix 40 mg bid, coreg 25 mg bid, spironolactone 25 mg daily 
- not on ACEi/ARB because of risk of hypotension and renal insufficiency 
- Echo LV EF 15-20% normal wall thickness, moderately dilated left ventricle, severely reduced systolic function 
-cardiologist on board Leukocytosis  
-afebrile 
-UA unremarkable 
-lactic acid normal 
-leukocytosis resolved  
Hx of AS s/p bioprosthetic AVR 
-stable  
HTN 
-BP normal, continue coreg, lasix, monitor BP Hx of COPD  
-not on bronchospastic 
-continue prn duo neb, monitor pulse ox  Hx of PAD   
-continue aspirin and lipitor Hx of hypercholesterolemia  
-continue lipitor 
  
  
  
Code status: Full Code DVT prophylaxis: Lovenox DISCHARGE DIAGNOSES / PLAN:   
 
A Flutter with RVR, now NSR 
-Continue coreg 25 mg bid, xarelto 20 mg daily, digoxin 250 mcg daily  
-cardiologist is on board, outpatient follow up for cardioversion Elevated troponin, hx of CAD s/p CABG 
-continue coreg, xarelto and lipitor Acute systolic CHF NYHA Class III on discharge 
-continue lasix 40 mg bid, coreg 25 mg bid, spironolactone 25 mg daily 
- not on ACEi/ARB due to risk of hypotension and renal insufficiency Leukocytosis possible reactive 
-afebrile Hx of AS s/p bioprosthetic AVR 
-stable  
HTN 
-continue coreg, lasix, monitor BP Hx of COPD  
-continue prn duo neb, monitor pulse ox  Hx of PAD   
-continue aspirin and lipitor Hx of hypercholesterolemia  
-continue lipitor PENDING TEST RESULTS:  
At the time of discharge the following test results are still pending: None FOLLOW UP APPOINTMENTS:   
Follow-up Information Follow up With Specialties Details Why Contact Info 1. Jb Walden MD Family Medicine In one week  [address] [place] [address] 
[phone] 2. Reyna Keller MD, Cardiologist in one week DIET: Cardiac Diet ACTIVITY: Activity as tolerated WOUND CARE: None EQUIPMENT needed: None DISCHARGE MEDICATIONS: 
Current Discharge Medication List  
  
START taking these medications Details  
digoxin (LANOXIN) 0.125 mg tablet Take 2 Tabs by mouth daily for 30 days. Qty: 60 Tab, Refills: 0  
  
rivaroxaban (XARELTO) 20 mg tab tablet Take 1 Tab by mouth daily (with dinner). Qty: 30 Tab, Refills: 0  
  
atorvastatin (LIPITOR) 40 mg tablet Take 1 Tab by mouth nightly. Qty: 30 Tab, Refills: 0  
  
spironolactone (ALDACTONE) 25 mg tablet Take 1 Tab by mouth daily. Qty: 30 Tab, Refills: 0 CONTINUE these medications which have CHANGED Details  
furosemide (LASIX) 40 mg tablet Take 1 Tab by mouth two (2) times a day for 30 days. Qty: 60 Tab, Refills: 0 CONTINUE these medications which have NOT CHANGED Details  
carvedilol (COREG) 25 mg tablet Take 1 Tab by mouth two (2) times daily (with meals). Qty: 60 Tab, Refills: 0  
  
pantoprazole (PROTONIX) 40 mg tablet Take 40 mg by mouth daily. STOP taking these medications  
  
 aspirin delayed-release 81 mg tablet Comments:  
Reason for Stopping:   
   
 simvastatin (ZOCOR) 40 mg tablet Comments:  
Reason for Stopping:   
   
  
 
 
 
NOTIFY YOUR PHYSICIAN FOR ANY OF THE FOLLOWING:  
Fever over 101 degrees for 24 hours. Chest pain, shortness of breath, fever, chills, nausea, vomiting, diarrhea, change in mentation, falling, weakness, bleeding. Severe pain or pain not relieved by medications. Or, any other signs or symptoms that you may have questions about. DISPOSITION: 
  Home With: 
 OT  PT  SWAPNIL  RN  
  
 Long term SNF/Inpatient Rehab x Independent/assisted living Hospice Other:  
 
 
PATIENT CONDITION AT DISCHARGE:  
 
Functional status Poor Deconditioned   
x Independent Cognition  
 x Lucid Forgetful Dementia Catheters/lines (plus indication) Velazquez PICC   
 PEG   
x None Code status  
 x Full code DNR   
 
PHYSICAL EXAMINATION AT DISCHARGE: 
Patient Vitals for the past 24 hrs: 
 Temp Pulse Resp BP SpO2  
10/11/20 0746 98.2 °F (36.8 °C) (!) 123 18 131/87 98 % 10/11/20 0500  97     
10/11/20 0459 98 °F (36.7 °C) (!) 119 16 116/70 98 % 10/11/20 0458  82     
10/11/20 0430  65     
10/10/20 2321 98 °F (36.7 °C) 73 16 132/80 98 % 10/10/20 1909 98 °F (36.7 °C) 80 18 104/65 99 % 10/10/20 1600 98.1 °F (36.7 °C) 63 18 131/84 99 % General:          Alert, cooperative, no distress, appears stated age. HEENT:           Atraumatic, anicteric sclerae, pink conjunctivae No oral ulcers, mucosa moist, throat clear, dentition fair Neck:               Supple, symmetrical 
Lungs:             Clear to auscultation bilaterally. No Wheezing or Rhonchi. No rales. Chest wall:      No tenderness  No Accessory muscle use. Heart:              Regular  rhythm,  No  murmur   No edema Abdomen:        Soft, non-tender. Not distended. Bowel sounds normal 
Extremities:     No cyanosis. No clubbing,   
                        Skin turgor normal, Capillary refill normal 
Skin:                Not pale. Not Jaundiced  No rashes Psych:             Not anxious or agitated. Neurologic:      Alert, moves all extremities, answers questions appropriately and responds to commands CHRONIC MEDICAL DIAGNOSES: 
Problem List as of 10/11/2020 Date Reviewed: 8/21/2018 Codes Class Noted - Resolved CHF (congestive heart failure), NYHA class IV (HCC) ICD-10-CM: I50.9 ICD-9-CM: 428.0  10/8/2020 - Present  Rapid atrial fibrillation (HCC) ICD-10-CM: I48.91 
 ICD-9-CM: 427.31  10/8/2020 - Present Atrial fibrillation with RVR (HCC) ICD-10-CM: I48.91 
ICD-9-CM: 427.31  8/20/2018 - Present PAD (peripheral artery disease) (HCC) ICD-10-CM: I73.9 ICD-9-CM: 443.9  7/6/2015 - Present Coronary artery disease due to lipid rich plaque ICD-10-CM: I25.10, I25.83 ICD-9-CM: 414.00, 414.3  7/6/2015 - Present S/P CABG (coronary artery bypass graft) ICD-10-CM: Z95.1 ICD-9-CM: V45.81  7/6/2015 - Present S/P AVR (aortic valve replacement) ICD-10-CM: B74.2 ICD-9-CM: V43.3  7/6/2015 - Present Encounter for long-term (current) use of other medications ICD-10-CM: Z79.899 ICD-9-CM: V58.69  3/22/2010 - Present Pure hypercholesterolemia ICD-10-CM: E78.00 ICD-9-CM: 272.0  3/22/2010 - Present Essential hypertension, benign ICD-10-CM: I10 
ICD-9-CM: 401.1  3/22/2010 - Present Coronary atherosclerosis of unspecified type of vessel, native or graft ICD-10-CM: I25.10 ICD-9-CM: 414.00  3/22/2010 - Present Greater than 45 minutes were spent with the patient on counseling and coordination of care Signed:  
Yue Cody MD 
10/11/2020 
11:19 AM

## 2020-10-11 NOTE — PROGRESS NOTES
Cardiology Progress Note 10/11/2020     Admit Date: 10/8/2020 Admit Diagnosis: CHF (congestive heart failure), NYHA class IV ([facility] [address] [I50.9] Rapid atrial fibrillation (HCC) [I48.91]  CC: none currently Assessment/Plan:  
Doing well off diltiazem gtt No CP, dyspnea or orthopnea. [id number] [address] for discharge. Will come back as an OP later this week for BARBARA/CV. Discharge cardiac meds: digoxin 250 mcg daily, carvedilol 25 mg bid, furosemide 40 mg bid, spironolactone 25 mg daily, Xarelto 20 mg daily, atorvastatin 40 mg Thanks For other plans, see orders. Subjective: [facility] reports Chest Pain:  [x]  none;  consistent with []  non-cardiac  []  atypical  []  angina [x]  none now    []  on-going Dyspnea: [x]  none    []  at rest    []  with exertion   []  improved    []  unchanged    []  worsening PND:       [x]  none      []  overnight Orthopnea:   [x]  none        []  improved         []  unchanged        []  worsening Presyncope: [x]  none        []  improved         []  unchanged        []  worsening Ambulated in hallway without symptoms  []  Yes Ambulated in room without symptoms  []  Yes Objective:  
 Physical Exam: 
Overall VSSAF;   
Visit Vitals /87 (BP 1 Location: Left arm, BP Patient Position: At rest) Pulse (!) 123 Temp 98.2 °F (36.8 °C) Resp 18 Ht 175.3 cm (69\") Wt 93.2 kg (205 lb 7.5 oz) SpO2 98% BMI 30.34 kg/m² Temp (24hrs), Av °F (36.7 °C), Min:97.8 °F (36.6 °C), Max:98.2 °F (36.8 °C) Patient Vitals for the past 8 hrs: 
 Pulse 10/11/20 0746 (!) 123  
10/11/20 0500 97  
10/11/20 0459 (!) 119  
10/11/20 0458 82  
10/11/20 0430 65 Patient Vitals for the past 8 hrs: 
 Resp 10/11/20 0746 18  
10/11/20 0459 16 Patient Vitals for the past 8 hrs: 
 BP  
10/11/20 0746 131/87  
10/11/20 0459 116/70 Intake/Output Summary (Last 24 hours) at 10/11/2020 1053 Last data filed at 10/11/2020 9458 Gross per 24 hour Intake 757.17 ml  
 Output  Net 757.17 ml General Appearance: Well developed, well nourished, no acute distress. Ears/Nose/Mouth/Throat:   Normal MM; anicteric. JVP: WNL Resp:   Lungs clear to auscultation bilaterally. Nl resp effort. Cardiovascular:  Irregular rhythm, S1, S2 normal, no new murmur. No gallop or rub. Abdomen:   Soft, non-tender, bowel sounds are present. Extremities: No edema bilaterally. Skin: 
Neuro: Warm and dry. A/O x3, grossly nonfocal  
 []  cath site intact w/o hematoma or bruit; distal pulse unchanged. Data Review:    
Telemetry independently reviewed : []  sinus      []  chronic afib     []  par afib    []  NSVT   
ECG independently reviewed:  []  NSR         []  no significant changes 
[] no new ECG provided for review Lab results reviewed as noted below. Current medications reviewed as noted below. No results for input(s): PH, PCO2, PO2 in the last 72 hours. Recent Labs 10/09/20 
0445 TROIQ 0.09* Recent Labs 10/10/20 
0416 10/09/20 
0445  139  
K 3.5 2.8*  
 103 CO2 27 31 BUN 19 15 CREA 1.14 1.02  
* 94 PHOS  --  2.1*  
CA 9.1 8.4* ALB 4.1  --   
WBC 10.8  --   
HGB 15.5  --   
HCT 47.5  --   
  --   
 
Recent Labs 10/10/20 
0416 ALT 35 AP 85 TBILI 2.1*  
TP 8.0 ALB 4.1  
GLOB 3.9 No results for input(s): INR, PTP, APTT, INREXT in the last 72 hours. No results for input(s): FE, TIBC, PSAT, FERR in the last 72 hours. No results found for: [facility] Current Facility-Administered Medications Medication Dose Route Frequency  rivaroxaban (XARELTO) tablet 20 mg  20 mg Oral DAILY WITH DINNER  carvediloL (COREG) tablet 25 mg  25 mg Oral BID WITH MEALS  digoxin (LANOXIN) tablet 0.125 mg  0.125 mg Oral DAILY  aspirin delayed-release tablet 81 mg  81 mg Oral DAILY  furosemide (LASIX) tablet 40 mg  40 mg Oral ACB&D  
 spironolactone (ALDACTONE) tablet 25 mg  25 mg Oral DAILY  pantoprazole (PROTONIX) tablet 40 mg  40 mg Oral DAILY  atorvastatin (LIPITOR) tablet 40 mg  40 mg Oral QHS  albuterol-ipratropium (DUO-NEB) 2.5 MG-0.5 MG/3 ML  3 mL Nebulization Q4H PRN Harsha Helms MD

## 2020-10-11 NOTE — PROGRESS NOTES
[facility] Adult  Hospitalist Group Hospitalist Progress Note Amarilis Morales MD 
Answering service: [phone] OR [phone] from in house phone Date of Service:  10/11/2020 NAME:  Deon Baeza :  1945 MRN:  [id number] Admission Summary: A 20-year-old male past medical history markable for aortic valvular stenosis, coronary artery disease, COPD, high cholesterol, hypertension, peripheral artery disease also s/p aortic valve replacement with porcine Medtronic valve previous four-vessel CABG, Afib  presents to the ER via EMS complaining increased shortness of breath times \"since 1 AM last night. \" Steffanie Banks states that shortness of breath is gradually worsening overnight denies being on home oxygen states that then suddenly worsened this morning.  Patient was brought in by EMS noted to be tripoding more comfortable on a nonrebreather.  When reassessed patient was taken off of oxygen immediately became more short of breath only able to speak 1-2 word sentences.  Was emergently placed on BiPAP.  His breathing improved in 3 hours of bipap. Currently he is off bipap. He denies recent illnesses fevers productive cough chest pain extremity edema. Denied any fever, no COVID 19 contract history. Interval history / Subjective: He said he feels better, no chest pain or shortness of breath, no cough or wheezing Assessment & Plan: A Flutter with RVR, now NSR 
-improved, off cardizem gtt,   
-seen by cardiologist and cleared him for discharge and recommended, coreg 25 mg bid, xarelto 20 mg daily, digoxin 250 mcg daily  
-cardiologist is on board, plan for possible cardioversion as an outpatient Elevated troponin, hx of CAD s/p CABG 
-troponin 0.09 x 2,  
-no left side chest pain, possible related to a flutter with RVR 
 -EKG atrial flutter vent rate 121 incomplete left bundle branch block, non specific st t wave 
-medication optimized to coreg, xarelto and lipitor  
-cardiologist on board Acute hypoxic respiratory failure due to pulmonary edema, acute systolic CHF 
-off BiPAP, SpO2 93-98% on RA 
-elevated porbnp 
-CXR mild pulmonary edema. Possible trace left pleural effusion. 
-continue lasix 40 mg bid, coreg 25 mg bid, spironolactone 25 mg daily 
- not on ACEi/ARB   
- Echo LV EF 15-20% normal wall thickness, moderately dilated left ventricle, severely reduced systolic function 
-cardiologist on board Leukocytosis  
-afebrile 
-UA unremarkable 
-lactic acid normal 
-leukocytosis resolved Hx of AS s/p bioprosthetic AVR 
-stable HTN 
-BP normal, continue coreg, lasix, monitor BP Hx of COPD  
-not on bronchospastic 
-continue prn duo neb, monitor pulse ox Hx of PAD   
-continue aspirin and lipitor Hx of hypercholesterolemia  
-continue lipitor Code status: Full Code DVT prophylaxis: Lovenox Care Plan discussed with: Patient/Family, Nurse and  Anticipated Disposition: Home w/Family Anticipated Discharge: 24 hours to 48 hours Hospital Problems  Date Reviewed: 8/21/2018 Codes Class Noted POA  
 CHF (congestive heart failure), NY[facility] class IV [facility] ICD-10-CM: I50.9 ICD-9-CM: 428.0  10/8/2020 Unknown Rapid atrial fibrillation [facility]) ICD-10-CM: I48.91 
ICD-9-CM: 427.31  10/8/2020 Unknown Vital Signs:  
 Last 24hrs VS reviewed since prior progress note. Most recent are: 
Visit Vitals /87 (BP 1 Location: Left arm, BP Patient Position: At rest) Pulse (!) 123 Temp 98.2 °F (36.8 °C) Resp 18 Ht 5' 9\" (1.753 m) Wt 93.2 kg (205 lb 7.5 oz) SpO2 98% BMI 30.34 kg/m² Intake/Output Summary (Last 24 hours) at 10/11/2020 1108 Last data filed at 10/11/2020 6242 Gross per 24 hour Intake 757.17 ml Output  Net 757.17 ml  
  
 
 Physical Examination:  
 
 
     
Constitutional:  No acute distress, cooperative, pleasant ENT:  Oral mucosa moist, oropharynx benign. Resp:  Decrease bronchial breath sound bilaterally. No wheezing/rhonchi/rales. No accessory muscle use CV:  Regular rhythm, tachycardia, no murmurs, gallops, rubs GI:  Soft, non distended, non tender. normoactive bowel sounds, no hepatosplenomegaly Musculoskeletal:  No edema Neurologic:  Moves all extremities. AAOx3, CN II-XII reviewed Skin:  Good turgor, no rashes or ulcers Data Review:  
 Review and/or order of clinical lab test 
Review and/or order of tests in the radiology section of CPT Review and/or order of tests in the medicine section of CPT Labs:  
 
Recent Labs 10/10/20 
0416 WBC 10.8 HGB 15.5 HCT 47.5  Recent Labs 10/10/20 
0416 10/09/20 
0445  139  
K 3.5 2.8*  
 103 CO2 27 31 BUN 19 15 CREA 1.14 1.02  
* 94  
CA 9.1 8.4* MG  --  1.6 PHOS  --  2.1* Recent Labs 10/10/20 
0416 ALT 35 AP 85 TBILI 2.1*  
TP 8.0 ALB 4.1  
GLOB 3.9 No results for input(s): INR, PTP, APTT, INREXT, INREXT in the last 72 hours. No results for input(s): FE, TIBC, PSAT, FERR in the last 72 hours. No results found for: FOL, RBCF No results for input(s): PH, PCO2, PO2 in the last 72 hours. Recent Labs 10/09/20 
0445 TROIQ 0.09* Lab Results Component Value Date/Time Cholesterol, total 143 07/06/2015 12:13 PM  
 HDL Cholesterol 28 (L) 07/06/2015 12:13 PM  
 LDL, calculated 72 07/06/2015 12:13 PM  
 Triglyceride 216 (H) 07/06/2015 12:13 PM  
 
No results found for: Karli Colon Lab Results Component Value Date/Time  Color DARK YELLOW 10/08/2020 12:17 PM  
 Appearance CLEAR 10/08/2020 12:17 PM  
 Specific gravity 1.023 10/08/2020 12:17 PM  
 pH (UA) 6.0 10/08/2020 12:17 PM  
 Protein 100 (A) 10/08/2020 12:17 PM  
 Glucose Negative 10/08/2020 12:17 PM  
 Ketone Negative 10/08/2020 12:17 PM  
 Bilirubin Negative 10/08/2020 12:17 PM  
 Urobilinogen 2.0 (H) 10/08/2020 12:17 PM  
 Nitrites Negative 10/08/2020 12:17 PM  
 Leukocyte Esterase Negative 10/08/2020 12:17 PM  
 Epithelial cells FEW 10/08/2020 12:17 PM  
 Bacteria Negative 10/08/2020 12:17 PM  
 WBC 0-4 10/08/2020 12:17 PM  
 RBC 0-5 10/08/2020 12:17 PM  
 
 
 
Medications Reviewed:  
 
Current Facility-Administered Medications Medication Dose Route Frequency  rivaroxaban (XARELTO) tablet 20 mg  20 mg Oral DAILY WITH DINNER  carvediloL (COREG) tablet 25 mg  25 mg Oral BID WITH MEALS  digoxin (LANOXIN) tablet 0.125 mg  0.125 mg Oral DAILY  aspirin delayed-release tablet 81 mg  81 mg Oral DAILY  furosemide (LASIX) tablet 40 mg  40 mg Oral ACB&D  
 spironolactone (ALDACTONE) tablet 25 mg  25 mg Oral DAILY  pantoprazole (PROTONIX) tablet 40 mg  40 mg Oral DAILY  atorvastatin (LIPITOR) tablet 40 mg  40 mg Oral QHS  albuterol-ipratropium (DUO-NEB) 2.5 MG-0.5 MG/3 ML  3 mL Nebulization Q4H PRN  
 
______________________________________________________________________ EXPECTED LENGTH OF STAY: - - - 
ACTUAL LENGTH OF STAY:          3 Corby Cheng MD

## 2020-10-11 NOTE — DISCHARGE INSTRUCTIONS
Discharge Instructions       PATIENT ID: Mireya Domínguez  MRN: [id number]   YOB: 1945    DATE OF ADMISSION: 10/8/2020  8:04 AM    DATE OF DISCHARGE: 10/11/2020    PRIMARY CARE PROVIDER: Christin Mckinney MD     ATTENDING PHYSICIAN: Navya Garcia MD  DISCHARGING PROVIDER: Amy Her MD    To contact this individual call [phone] and ask the  to page. If unavailable ask to be transferred the Adult Hospitalist Department. DISCHARGE DIAGNOSES   A Flutter with RVR, now NSR  Elevated troponin, hx of CAD s/p CABG  Acute hypoxic respiratory failure due to pulmonary edema, acute systolic CHF  Leukocytosis   Hx of AS s/p bioprosthetic AVR  HTN  Hx of COPD   Hx of PAD    Hx of hypercholesterolemia     CONSULTATIONS: IP CONSULT TO CARDIOLOGY    PROCEDURES/SURGERIES: * No surgery found *    PENDING TEST RESULTS:   At the time of discharge the following test results are still pending: None    FOLLOW UP APPOINTMENTS:   Follow-up Information     Follow up With Specialties Details Why Contact Info   1. Christin Mckinney MD Family Medicine In one week  [address] [place] [address] 83.  [phone]        2. Trent Rodríguez MD, Cardiologist in one week    ADDITIONAL CARE RECOMMENDATIONS:      DIET: Cardiac Diet     ACTIVITY: Activity as tolerated    WOUND CARE: None    EQUIPMENT needed: None      DISCHARGE MEDICATIONS:   See Medication Reconciliation Form    · It is important that you take the medication exactly as they are prescribed. · Keep your medication in the bottles provided by the pharmacist and keep a list of the medication names, dosages, and times to be taken in your wallet. · Do not take other medications without consulting your doctor. NOTIFY YOUR PHYSICIAN FOR ANY OF THE FOLLOWING:   Fever over 101 degrees for 24 hours. Chest pain, shortness of breath, fever, chills, nausea, vomiting, diarrhea, change in mentation, falling, weakness, bleeding. Severe pain or pain not relieved by medications. Or, any other signs or symptoms that you may have questions about.       DISPOSITION:    Home With:   OT  PT  HH  RN       SNF/Inpatient Rehab/LTAC   x Independent/assisted living    Hospice    Other:     CDMP Checked:   Yes x     PROBLEM LIST Updated:  Yes x       Signed:   Mable Whitfield MD  10/11/2020  11:16 AM

## 2020-10-11 NOTE — PROGRESS NOTES
1930: Bedside and Verbal shift change report given to [address] (oncoming nurse) by Tracy Cárdenas RN (offgoing nurse). Report included the following information SBAR, Kardex, Intake/Output, Recent Results, Med Rec Status and Cardiac Rhythm NSR.  
 
2237: Monitor tech notified RN that patient had 6 beats of SVT. Patient remains asymptomatic at this time. Will continue to monitor patient condition. 0730: Bedside and Verbal shift change report given to Tanya Potts (oncoming nurse) by John Deng RN (offgoing nurse). Report included the following information SBAR, Kardex, Intake/Output, Recent Results, Med Rec Status and Cardiac Rhythm NSR.

## 2020-10-12 NOTE — PROGRESS NOTES
Patient contacted regarding recent discharge and COVID-19 risk. Discussed COVID-19 related testing which was not done at this time. Test results were not done. Patient informed of results, if available? n/a Care Transition Nurse/ Ambulatory Care Manager/ LPN Care Coordinator contacted the patient by telephone to perform post discharge assessment. Verified name and  with patient as identifiers. Patient has following risk factors of: heart failure. CTN/ACM/LPN reviewed discharge instructions, medical action plan and red flags related to discharge diagnosis. Reviewed and educated them on any new and changed medications related to discharge diagnosis. Advised obtaining a 90-day supply of all daily and as-needed medications. Advance Care Planning:  
Does patient have an Advance Directive: currently not on file; education provided Education provided regarding infection prevention, and signs and symptoms of COVID-19 and when to seek medical attention with patient who verbalized understanding. Discussed exposure protocols and quarantine from [address] you at higher risk for severe illness  and given an opportunity for questions and concerns. The patient agrees to contact the COVID-19 hotline [phone] or PCP office for questions related to their healthcare. CTN/ACM/LPN provided contact information for future reference. From CDC: Are you at higher risk for severe illness?  Wash your hands often.  Avoid close contact (6 feet, which is about two arm lengths) with people who are sick.  Put distance between yourself and other people if COVID-19 is spreading in your community.  Clean and disinfect frequently touched surfaces.  Avoid all cruise travel and non-essential air travel.  Call your healthcare professional if you have concerns about COVID-19 and your underlying condition or if you are sick.  
 
For more information on steps you can take to protect yourself, see CDC's How to Protect Yourself Patient/family/caregiver given information for [employer] and agrees to enroll n/a Patient's preferred e-mail:  n/a Patient's preferred phone number: n/a Based on Loop alert triggers, patient will be contacted by nurse care manager for worsening symptoms. Plan for follow-up call in 5-7 days based on severity of symptoms and risk factors. Patient scheduled for cardiac procedure on 10/15/2020. Cost of Xarelto was $850 and only given 15 tablets of Digoxin 0.125 due to insurance. Patient will discuss with cardio at procedure on Thursday. Today's weight is 205 pounds.

## 2020-10-15 PROBLEM — R00.1 BRADYCARDIA: Status: ACTIVE | Noted: 2020-01-01

## 2020-10-15 NOTE — Clinical Note
Dressed using 4 X 4, topical skin adhesive and transparent dressing. Site: clean, dry, & intact, no bleeding and no hematoma.

## 2020-10-15 NOTE — PROGRESS NOTES
Cardiac Cath Lab Recovery Arrival Note: 
 
 
Genet Rhodes arrived to Cardiac Cath Lab, Recovery Area. Staff introduced to patient. Patient identifiers verified with NAME and DATE OF BIRTH. Procedure verified with patient. Consent forms reviewed and signed by patient or authorized representative and verified. Allergies verified. Patient and family oriented to department. Patient and family informed of procedure and plan of care. Questions answered with review. Patient prepped for procedure, per orders from physician, prior to arrival. 
 
Patient on cardiac monitor, non-invasive blood pressure, SPO2 monitor. On room air. Patient is A&Ox 4. Patient reports no complaints. Patient in stretcher, in low position, with side rails up, call bell within reach, patient instructed to call if assistance as needed. Patient prep in: [address], [place] [address]. Family in: Magnolia Rangel (wife) [phone].   
Prep by: Florin Escamilla RN

## 2020-10-15 NOTE — PROGRESS NOTES
TRANSFER - IN REPORT: 
 
Verbal report received from Alicia Zayas on Pattie Suarez  being received from procedure for routine progression of care. Report consisted of patients Situation, Background, Assessment and Recommendations(SBAR). Information from the following report(s) Procedure Summary was reviewed with the receiving clinician. Opportunity for questions and clarification was provided. Assessment completed upon patients arrival to [address] [place] [address] and care assumed. Cardiac Cath Lab Recovery Arrival Note: 
 
Pattie Suarez arrived to [facility] recovery area. Patient procedure= BARBARA and cardioversion. Patient on cardiac monitor, non-invasive blood pressure, SPO2 monitor. On room air. IV  of saline locked on pump at 25 ml/hr. Patient status doing well without problems. Patient is A&Ox 4. Patient reports no complaints. No change in patient status. Continue to monitor patient and status.

## 2020-10-15 NOTE — PROGRESS NOTES
TRANSFER - OUT REPORT: 
 
Verbal report given to MATTHEW Huff(name) on Josey Brumfield  being transferred to CVSU(unit) for routine progression of care Report consisted of patients Situation, Background, Assessment and  
Recommendations(SBAR). Information from the following report(s) SBAR, Procedure Summary and Cardiac Rhythm junctional was reviewed with the receiving nurse. Lines:  
Peripheral IV 10/15/20 Left Hand (Active) Site Assessment Clean, dry, & intact 10/15/20 1420 Phlebitis Assessment 0 10/15/20 1420 Infiltration Assessment 0 10/15/20 1420 Dressing Status Clean, dry, & intact 10/15/20 1420 Dressing Type Transparent;Tape 10/15/20 1420 Hub Color/Line Status Blue; Infusing 10/15/20 1420 Action Taken Open ports on tubing capped 10/15/20 1420 Alcohol Cap Used Yes 10/15/20 1420 Opportunity for questions and clarification was provided. Patient transported with: 
 Monitor Registered Nurse

## 2020-10-15 NOTE — ANESTHESIA POSTPROCEDURE EVALUATION
Post-Anesthesia Evaluation and Assessment Patient: Nahomi Lindquist MRN: [id number]  SSN: [id number] YOB: 1945  Age: 76 y.o. Sex: male I have evaluated the patient and they are stable and ready for discharge from the PACU. Cardiovascular Function/Vital Signs Visit Vitals BP (!) 103/58 (BP 1 Location: Right arm, BP Patient Position: At rest) Pulse (!) 58 Temp 36.8 °C (98.2 °F) Resp 15 Ht 5' 8\" (1.727 m) Wt 92.1 kg (203 lb) SpO2 99% BMI 30.87 kg/m² Patient is status post * No anesthesia type entered * anesthesia for * No procedures listed *. Nausea/Vomiting: None Postoperative hydration reviewed and adequate. Pain: 
Pain Scale 1: Numeric (0 - 10) (10/15/20 1420) Pain Intensity 1: 0 (10/15/20 1420) Managed Neurological Status: At baseline Mental Status, Level of Consciousness: Alert and  oriented to person, place, and time Pulmonary Status:  
O2 Device: Room air (10/15/20 1420) Adequate oxygenation and airway patent Complications related to anesthesia: None Post-anesthesia assessment completed. No concerns Signed By: Aury Goodrich MD   
 October 15, 2020 * No procedures listed *. MAC 
 
<BSHSIANPOST> INITIAL Post-op Vital signs:  
Vitals Value Taken Time /62 10/15/2020  2:34 PM  
Temp Pulse 53 10/15/2020  2:38 PM  
Resp SpO2 96 % 10/15/2020  2:38 PM  
Vitals shown include unvalidated device data.

## 2020-10-15 NOTE — ANESTHESIA PREPROCEDURE EVALUATION
Relevant Problems No relevant active problems Anesthetic History No history of anesthetic complications Review of Systems / Medical History Patient summary reviewed, nursing notes reviewed and pertinent labs reviewed Pulmonary COPD: moderate Neuro/Psych Within defined limits Cardiovascular Hypertension Valvular problems/murmurs: tricuspid insufficiency, pulmonic insufficiency and mitral insufficiency Dysrhythmias : atrial flutter CAD, PAD and CABG Exercise tolerance: <4 METS Comments: S/p AVR  
GI/Hepatic/Renal 
Within defined limits Endo/Other Within defined limits Other Findings Comments: aortic valvular stenosis Physical Exam 
 
Airway Mallampati: II 
TM Distance: > 6 cm Neck ROM: normal range of motion Mouth opening: Normal 
 
 Cardiovascular Rhythm: irregular Rate: abnormal 
 
Murmur: Grade 3, Aortic area Dental 
 
Dentition: Edentulous, Full lower dentures and Full upper dentures Pulmonary Breath sounds clear to auscultation Abdominal 
GI exam deferred Other Findings Anesthetic Plan ASA: 3 Anesthesia type: MAC Induction: Intravenous Anesthetic plan and risks discussed with: Patient

## 2020-10-15 NOTE — PROGRESS NOTES
Cardiac Cath Lab Procedure Area Arrival Note: 
 
[email or web address]  arrived to Cardiac Cath Lab, Procedure Area. Patient identifiers verified with NAME and DATE OF BIRTH. Procedure verified with patient. Consent forms verified. Allergies verified. Patient informed of procedure and plan of care. Questions answered with review. Patient voiced understanding of procedure and plan of care. Patient on cardiac monitor, non-invasive blood pressure, SPO2 monitor. On ra, placed on O2 @ 2 lpm via nc. IV of ns on pump at 25 ml/hr. Patient status doing well without problems. Patient is A&Ox 3. Patient reports no pain. Patient medicated during procedure with orders obtained and verified by Dr. Roxana Brooke. Refer to patients Cardiac Cath Lab PROCEDURE REPORT for vital signs, assessment, status, and response during procedure, printed at end of case. Printed report on chart or scanned into chart.

## 2020-10-16 NOTE — PROGRESS NOTES
Reason for Admission: Patient presented with junctional rhythm, may need Pacemaker RUR Score:  OBS status Plan for utilizing home health:  No home health needs identified at this time PCP: First and Last name:  Dr. Drew Palomino Name of Practice: [address] [place] [address] Are you a current patient: Yes/No: Yes Approximate date of last visit: N/A Can you participate in a virtual visit with your PCP: N/A Current Advanced Directive/Advance Care Plan: Not on file- spouse is legal [facility] Transition of Care Plan:  Home when stable Observation notice provided in writing to patient and/or caregiver as well as verbal explanation of the policy. Patients who are in outpatient status also receive the Observation notice. The CM met with the patient and his wife at bedside in order to introduce the role of CM and assess for patient needs. The patient lives with his wife and grandson, verified demographics and insurance information. The patient endorses having prescription drug coverage- utilizes [address] [place] [address] for prescriptions, patient denied difficulty accessing medications, however, Xarelto is expensive- CM provided family with phone number (also have Xarelto free 30-day voucher), and number to call to inquire about program assistance. The patient is independent with ADLs and mobility, still working 40 hours/week as a . The patient denied any concerns for discharge home- family to transport. The CM will follow for transitions of care. JB Joiner Care Management Interventions PCP Verified by CM: Yes(Patient verified PCP as Dr. Drew Palomino ) Mode of Transport at Discharge: Other (see comment)(Family to transport ) Transition of Care Consult (CM Consult): Discharge Planning Physical Therapy Consult: No 
Occupational Therapy Consult: No 
 Current Support Network: Own Home, Lives with Spouse Confirm Follow Up Transport: Family Discharge Location Discharge Placement: Home

## 2020-10-16 NOTE — PROGRESS NOTES
Pharmacist Discharge Medication Reconciliation Discharging Provider: Dr. Chanell Palma PMH:  
Past Medical History:  
Diagnosis Date Aortal valvular stenosis CAD (coronary artery disease) COPD Hypercholesterolemia Hypertension PAD (peripheral artery disease) ([facility] [address] Chief Complaint for this Admission: No chief complaint on file. Allergies: Patient has no known allergies. Discharge Medications:  
Current Discharge Medication List  
  
 
START taking these medications Details  
cephALEXin (Keflex) 500 mg capsule Take 1 Cap by mouth two (2) times a day for 7 days. Qty: 14 Cap, Refills: 0 CONTINUE these medications which have NOT CHANGED Details  
digoxin (LANOXIN) 0.125 mg tablet Take 2 Tabs by mouth daily for 30 days. Qty: 60 Tab, Refills: 0  
  
rivaroxaban (XARELTO) 20 mg tab tablet Take 1 Tab by mouth daily (with dinner). Qty: 30 Tab, Refills: 0  
  
atorvastatin (LIPITOR) 40 mg tablet Take 1 Tab by mouth nightly. Qty: 30 Tab, Refills: 0  
  
spironolactone (ALDACTONE) 25 mg tablet Take 1 Tab by mouth daily. Qty: 30 Tab, Refills: 0  
  
furosemide (LASIX) 40 mg tablet Take 1 Tab by mouth two (2) times a day for 30 days. Qty: 60 Tab, Refills: 0  
  
carvedilol (COREG) 25 mg tablet Take 1 Tab by mouth two (2) times daily (with meals). Qty: 60 Tab, Refills: 0  
  
pantoprazole (PROTONIX) 40 mg tablet Take 40 mg by mouth daily. The patient's chart, MAR and AVS were reviewed by Jameel Pittman RPH.

## 2020-10-16 NOTE — PROGRESS NOTES
0730: Bedside shift change report given to Autumn Marie (oncoming nurse) by Meg Kraft (offgoing nurse). Report included the following information SBAR, Kardex, Intake/Output, MAR, Accordion, Recent Results, and Cardiac Rhythm junctional/accelerated junctional .  
 
1050: TRANSFER - OUT REPORT: 
 
Verbal report given to cath lab (name) on [facility]  being transferred to cath lab (unit) for ordered procedure Report consisted of patients Situation, Background, Assessment and  
Recommendations(SBAR). Information from the following report(s) SBAR, Kardex, Intake/Output, MAR, Accordion, Recent Results and Cardiac Rhythm junctional  was reviewed with the receiving nurse. Lines:    
 
Opportunity for questions and clarification was provided. Patient transported with: 
 Monitor Registered Nurse 
 
 
1330: TRANSFER - IN REPORT: 
 
Verbal report received from Ev Benji (name) on [facility]  being received from cath lab (unit) for routine post - op Report consisted of patients Situation, Background, Assessment and  
Recommendations(SBAR). Information from the following report(s) SBAR, Kardex, Intake/Output, MAR, Accordion, Recent Results and Cardiac Rhythm paced was reviewed with the receiving nurse. Opportunity for questions and clarification was provided. Assessment completed upon patients arrival to unit and care assumed. 1530: Patient now more alert. Ambulated throughout the room. Patient steady and feeling awake. Verbalized pacemaker precautions. RN further reinforce precautions and MD instructions, including not driving for 1 week. [phone]: Patient stating that he was told by Beck Barraza MD this AM to stop digoxin after pacemaker placement. Digoxin currently on discharge medications. Paged MD for clarification. Per MD, patient to discontinue use of digoxin. Updated patient's discharge instructions.  Also informed of patient request for letter to return to work in 7 days. MD stated to give letter stating he could return to work in 2 weeks and he may be cleared at [facility] appointment with Dr. Brianna Hernández office. RN to provide letter. 1650: Discharge medications reviewed with patient and spouse and appropriate educational materials and side effects teaching were provided. I have reviewed discharge instructions with the patient and spouse. The patient and spouse verbalized understanding. Problem: Falls - Risk of 
Goal: *Absence of Falls Description: Document Rochele Gordon Fall Risk and appropriate interventions in the flowsheet. Outcome: Progressing Towards Goal 
Note: Fall Risk Interventions: 
  
 
  
 
Medication Interventions: Patient to call before getting OOB, Teach patient to arise slowly Problem: Patient Education: Go to Patient Education Activity Goal: Patient/Family Education Outcome: Progressing Towards Goal 
  
Problem: Arrhythmia Pathway (Adult) Goal: *Absence of arrhythmia Outcome: Progressing Towards Goal 
  
Problem: Patient Education: Go to Patient Education Activity Goal: Patient/Family Education Outcome: Progressing Towards Goal 
  
Problem: Pressure Injury - Risk of 
Goal: *Prevention of pressure injury Description: Document Amilcar Scale and appropriate interventions in the flowsheet. Outcome: Progressing Towards Goal 
Note: Pressure Injury Interventions: Activity Interventions: Increase time out of bed Problem: Patient Education: Go to Patient Education Activity Goal: Patient/Family Education Outcome: Progressing Towards Goal

## 2020-10-16 NOTE — PROGRESS NOTES
TRANSFER - IN REPORT: 
 
Verbal report received from Maria D Aguilar 411 on Josey Brumfield  being received from procedural area for routine progression of care. Report consisted of patients Situation, Background, Assessment and Recommendations(SBAR). Information from the following report(s) Procedure Summary, MAR and Recent Results was reviewed with the receiving clinician. Opportunity for questions and clarification was provided. Assessment completed upon patients arrival to [address] [place] [address] and care assumed. Cardiac Cath Lab Recovery Arrival Note: 
 
Josey Brumfield arrived to [facility] recovery area. Patient procedure= PPI. Patient on cardiac monitor, non-invasive blood pressure, SPO2 monitor. On  or O2 @ 2 lpm via NC.  IV  of NS on pump at 25 ml/hr. Patient status doing well without problems. Patient is A&Ox 3. Patient reports no c/o's. PROCEDURE SITE CHECK: 
 
Procedure site:without any bleeding and hematoma, no pain/discomfort reported at procedure site. No change in patient status. Continue to monitor patient and status.

## 2020-10-16 NOTE — DISCHARGE INSTRUCTIONS
PATIENT INSTRUCTIONS POST-PACEMAKER IMPLANT    1. No heavy lifting or exercises with the left arm for 2 weeks. This includes the following:  Do not raise arm above the shoulder level to comb hair, pull on clothes, etc... Do not use the affected arm to pull up or push up from a seated or laying  down position. Do not allow anyone else to pull on the affected arm. 2.  Do not shower for 7 days after discharge from the hospital.  Sponge baths are O.K., provided the pacemaker site is kept dry and the  affected arm is not raised and movement is limited. 3.  Please do not remove the dressing. 4.  Do not drive for 1 week. 5.  Call Dr. Sherry Plasencia [phone] if you experience any of the following symptoms:  1. Redness at the pacemaker site  2. Swelling at or around the pacemaker or in the left arm  3. Pain around the pacemaker  4. Dizziness, lightheadedness, fainting spells  5. Lack of energy  6. Shortness of breath  7. Rapid heart rate  8. Chest or muscle twitches    6. Follow-up with Dr. Sherry Plasencia  in 7 days.         Medications to take at home: keflex 500 mg tablet; take one tablet twice a day for one week    DATE: __________  Physician: ______________________________

## 2020-10-16 NOTE — PROGRESS NOTES
Cardiac Cath Lab Procedure Area Arrival Note: 
 
Edgardo Chirinos arrived to Cardiac Cath Lab, Procedure Area. Patient identifiers verified with NAME and DATE OF BIRTH. Procedure verified with patient. Consent forms verified. Allergies verified. Patient informed of procedure and plan of care. Questions answered with review. Patient voiced understanding of procedure and plan of care. Patient on cardiac monitor, non-invasive blood pressure, SPO2 monitor. On room air then placed on or O2 @ 2 lpm via NC.  IV of normal saline started on pump at 25 ml/hr. Patient status doing well without problems. Patient is A&Ox 4. Patient reports no pain. Patient medicated during procedure with orders obtained and verified by Dr. Aisha Moraes. Refer to patients Cardiac Cath Lab PROCEDURE REPORT for vital signs, assessment, status, and response during procedure, printed at end of case. Printed report on chart or scanned into chart.

## 2020-10-16 NOTE — PROGRESS NOTES
Cardiology Progress Note 10/16/2020     Admit Date: 10/15/2020 Admit Diagnosis: Typical atrial flutter (HCC) [I48.3] Bradycardia [R00.1]  CC: none currently Assessment/Plan:  
Mr Marcos Niño remains in an escape junctional rhythm at 40 BPM. He is up in his bedside chair and has no complaints. I explained to the patient that he needs a permanent pacemaker since his sinus node has not taken over now 20 hours post DCCV. I explained that he is at risk for cardiac arrest without a pacemaker. He is waiting for his wife to come to discuss. I will speak with them both when she arrives. He is NPO for now until a decision is made. His labs this morning are fine including digoxin level 1.0, Cr 1.24, K+ 3.8, Mg++ 2.0 For other plans, see orders. Subjective: [facility] reports Chest Pain:  [x]  none;  consistent with []  non-cardiac  []  atypical  []  angina [x]  none now    []  on-going Dyspnea: [x]  none    []  at rest    []  with exertion   []  improved    []  unchanged    []  worsening PND:       [x]  none      []  overnight Orthopnea:   [x]  none        []  improved         []  unchanged        []  worsening Presyncope: [x]  none        []  improved         []  unchanged        []  worsening Ambulated in hallway without symptoms  []  Yes Ambulated in room without symptoms  []  Yes Objective:  
 Physical Exam: 
Overall VSSAF;   
Visit Vitals /67 (BP 1 Location: Left arm, BP Patient Position: At rest) Pulse (!) 36 Temp 97.6 °F (36.4 °C) Resp 18 Ht 172.7 cm (68\") Wt 94 kg (207 lb 3.7 oz) SpO2 97% BMI 31.51 kg/m² Temp (24hrs), Av.9 °F (36.6 °C), Min:97.6 °F (36.4 °C), Max:98.3 °F (36.8 °C) Patient Vitals for the past 8 hrs: 
 Pulse 10/16/20 0500 (!) 36 Patient Vitals for the past 8 hrs: 
 Resp 10/16/20 0500 18 Patient Vitals for the past 8 hrs: 
 BP  
10/16/20 0500 132/67 Intake/Output Summary (Last 24 hours) at 10/16/2020 5325 Last data filed at 10/16/2020 4122 Gross per 24 hour Intake 830 ml Output 0 ml Net 830 ml General Appearance: Well developed, well nourished, no acute distress. Ears/Nose/Mouth/Throat:   Normal MM; anicteric. JVP: WNL Resp:   Lungs clear to auscultation bilaterally. Nl resp effort. Cardiovascular:  RRR, S1, S2 normal, no new murmur. No gallop or rub. Abdomen:   Soft, non-tender, bowel sounds are present. Extremities: No edema bilaterally. Skin: 
Neuro: Warm and dry. A/O x3, grossly nonfocal  
 []  cath site intact w/o hematoma or bruit; distal pulse unchanged. Data Review:    
Telemetry independently reviewed : []  sinus      []  chronic afib     []  par afib    []  NSVT   
ECG independently reviewed:  []  NSR         []  no significant changes 
[] no new ECG provided for review Lab results reviewed as noted below. Current medications reviewed as noted below. No results for input(s): PH, PCO2, PO2 in the last 72 hours. No results for input(s): CPK, CKMB, TROIQ in the last 72 hours. Recent Labs 10/16/20 
0601 * K 3.8 CL 97  
CO2 28 BUN 23* CREA 1.24  CA 9.4 No results for input(s): ALT, AP, TBIL, TBILI, TP, ALB, GLOB, GGT, AML, LPSE in the last 72 hours. No lab exists for component: SGOT, GPT, AMYP, HLPSE No results for input(s): INR, PTP, APTT, INREXT in the last 72 hours. No results for input(s): FE, TIBC, PSAT, FERR in the last 72 hours. No results found for: [facility] Current Facility-Administered Medications Medication Dose Route Frequency  atorvastatin (LIPITOR) tablet 40 mg  40 mg Oral QHS  [Held by provider] rivaroxaban (XARELTO) tablet 20 mg  20 mg Oral DAILY  pantoprazole (PROTONIX) tablet 40 mg  40 mg Oral DAILY  0.9% sodium chloride infusion  50 mL/hr IntraVENous CONTINUOUS Mariah Fall MD

## 2020-10-16 NOTE — PROGRESS NOTES
Verbal report given to Nena(name) on NAME  being transferred to CVSU(unit) for routine progression of care Report consisted of patients Situation, Background, Assessment and  
Recommendations(SBAR). Information from the following report(s) Procedure Summary, MAR and Recent Results was reviewed with the receiving nurse. Lines:    
 
Opportunity for questions and clarification was provided. Patient transported with: 
 Monitor Registered Nurse

## 2020-10-16 NOTE — PROGRESS NOTES
Asked to see Pt by Dr Samuel So this am  
In interviewing pt , he was upset \" wanted to know what is going on\" Tried to explain but pt repeatedly interrupted explanation and asked me to leave.  
 
I will not follow base on pt request.

## 2020-10-16 NOTE — PROGRESS NOTES
1620: TRANSFER - IN REPORT: 
 
Verbal report received from [employer] (name) on Nancy Frederick  being received from cath lab (unit) for routine progression of care Report consisted of patients Situation, Background, Assessment and  
Recommendations(SBAR). Information from the following report(s) SBAR, Kardex, Procedure Summary, Intake/Output, MAR, Accordion, Recent Results, and Cardiac Rhythm junctional/accelerated junctional  was reviewed with the receiving nurse. Opportunity for questions and clarification was provided. Assessment completed upon patients arrival to unit and care assumed. 1745: Patient HR dropping down in the 30s. Paged cardiology. Patient asymptomatic.  
 
9616: Spoke with Laura Jeong MD, regarding HR. As patient is asymptomatic, no new orders at this time. Clarified when MD would like to be notified. Per MD, notify provider if HR <30 or if patient becomes symptomatic. 1930: Bedside shift change report given to [address] [place], [place] [address]Sae Fierro (oncoming nurse) by Josh Anaya RN (offgoing nurse). Report included the following information SBAR, Kardex, Procedure Summary, Intake/Output, MAR, Accordion, Recent Results, and Cardiac Rhythm junctional/accelerated junctional . Problem: Falls - Risk of 
Goal: *Absence of Falls Description: Document Suzi Heads Fall Risk and appropriate interventions in the flowsheet. Outcome: Progressing Towards Goal 
Note: Fall Risk Interventions: 
  
 
  
 
Medication Interventions: Patient to call before getting OOB, Teach patient to arise slowly Problem: Patient Education: Go to Patient Education Activity Goal: Patient/Family Education Outcome: Progressing Towards Goal 
  
Problem: Arrhythmia Pathway (Adult) Goal: *Absence of arrhythmia Outcome: Progressing Towards Goal 
  
Problem: Patient Education: Go to Patient Education Activity Goal: Patient/Family Education Outcome: Progressing Towards Goal 
  
Problem: Pressure Injury - Risk of 
 Goal: *Prevention of pressure injury Description: Document Amilcar Scale and appropriate interventions in the flowsheet. Outcome: Progressing Towards Goal 
Note: Pressure Injury Interventions: Activity Interventions: Increase time out of bed Problem: Patient Education: Go to Patient Education Activity Goal: Patient/Family Education Outcome: Progressing Towards Goal

## 2020-10-16 NOTE — PROGRESS NOTES
[facility] visit. [facility] He was out of his room for a procedure. Prayer for spiritual communion offered for his well-being. ABDULLAHI Stallworth, RN, ACSW, LCSW  Page:  [phone]

## 2020-10-20 NOTE — PROGRESS NOTES
Patient reports no viral symptoms. Denies chest pain, SOB,N/V/D,fever, drainage and odor. Cardio appointment on 10/27/2020.

## 2020-10-27 NOTE — PROGRESS NOTES
Goals  Reduce risk of CHF exacerbations and complications. 10/27/2020 Patient reports attendance of  appointments, pacemaker check today. Weight today 196 pounds. Denies chest pain, SOB, N/V/D, fever. Patient will continue to monitor CHF S&S and report to CTN at next outreach.  BENNETT

## 2021-01-01 ENCOUNTER — APPOINTMENT (OUTPATIENT)
Dept: GENERAL RADIOLOGY | Age: 76
DRG: 308 | End: 2021-01-01
Attending: NURSE PRACTITIONER
Payer: MEDICARE

## 2021-01-01 ENCOUNTER — HOSPITAL ENCOUNTER (OUTPATIENT)
Dept: INFUSION THERAPY | Age: 76
Discharge: HOME | End: 2021-04-22
Payer: MEDICARE

## 2021-01-01 ENCOUNTER — APPOINTMENT (OUTPATIENT)
Dept: CT IMAGING | Age: 76
DRG: 308 | End: 2021-01-01
Attending: NURSE PRACTITIONER
Payer: MEDICARE

## 2021-01-01 ENCOUNTER — OFFICE VISIT (OUTPATIENT)
Dept: ONCOLOGY | Age: 76
End: 2021-01-01
Payer: MEDICARE

## 2021-01-01 ENCOUNTER — APPOINTMENT (OUTPATIENT)
Dept: NON INVASIVE DIAGNOSTICS | Age: 76
DRG: 308 | End: 2021-01-01
Attending: NURSE PRACTITIONER
Payer: MEDICARE

## 2021-01-01 ENCOUNTER — HOSPITAL ENCOUNTER (INPATIENT)
Age: 76
LOS: 2 days | DRG: 308 | End: 2021-08-04
Attending: EMERGENCY MEDICINE | Admitting: INTERNAL MEDICINE
Payer: MEDICARE

## 2021-01-01 ENCOUNTER — APPOINTMENT (OUTPATIENT)
Dept: GENERAL RADIOLOGY | Age: 76
DRG: 308 | End: 2021-01-01
Attending: EMERGENCY MEDICINE
Payer: MEDICARE

## 2021-01-01 ENCOUNTER — HOSPITAL ENCOUNTER (OUTPATIENT)
Dept: INFUSION THERAPY | Age: 76
Discharge: HOME | End: 2021-04-29
Payer: MEDICARE

## 2021-01-01 ENCOUNTER — HOSPITAL ENCOUNTER (EMERGENCY)
Age: 76
Discharge: HOME | End: 2021-04-13
Attending: EMERGENCY MEDICINE
Payer: MEDICARE

## 2021-01-01 ENCOUNTER — APPOINTMENT (OUTPATIENT)
Dept: CT IMAGING | Age: 76
DRG: 308 | End: 2021-01-01
Attending: EMERGENCY MEDICINE
Payer: MEDICARE

## 2021-01-01 ENCOUNTER — APPOINTMENT (OUTPATIENT)
Dept: GENERAL RADIOLOGY | Age: 76
DRG: 308 | End: 2021-01-01
Attending: INTERNAL MEDICINE
Payer: MEDICARE

## 2021-01-01 VITALS
TEMPERATURE: 97.8 F | RESPIRATION RATE: 21 BRPM | OXYGEN SATURATION: 100 % | DIASTOLIC BLOOD PRESSURE: 61 MMHG | SYSTOLIC BLOOD PRESSURE: 131 MMHG | HEIGHT: 68 IN | BODY MASS INDEX: 30.74 KG/M2 | WEIGHT: 202.82 LBS | HEART RATE: 60 BPM

## 2021-01-01 VITALS
SYSTOLIC BLOOD PRESSURE: 107 MMHG | OXYGEN SATURATION: 100 % | DIASTOLIC BLOOD PRESSURE: 63 MMHG | RESPIRATION RATE: 16 BRPM | TEMPERATURE: 98.3 F | HEART RATE: 60 BPM

## 2021-01-01 VITALS
BODY MASS INDEX: 30.14 KG/M2 | SYSTOLIC BLOOD PRESSURE: 66 MMHG | OXYGEN SATURATION: 19 % | HEIGHT: 68 IN | WEIGHT: 198.85 LBS | TEMPERATURE: 98.3 F | DIASTOLIC BLOOD PRESSURE: 37 MMHG

## 2021-01-01 VITALS
HEART RATE: 58 BPM | SYSTOLIC BLOOD PRESSURE: 88 MMHG | HEIGHT: 68 IN | RESPIRATION RATE: 18 BRPM | TEMPERATURE: 97.9 F | BODY MASS INDEX: 29.61 KG/M2 | WEIGHT: 195.4 LBS | DIASTOLIC BLOOD PRESSURE: 53 MMHG | OXYGEN SATURATION: 98 %

## 2021-01-01 VITALS
HEART RATE: 64 BPM | SYSTOLIC BLOOD PRESSURE: 118 MMHG | HEIGHT: 68 IN | DIASTOLIC BLOOD PRESSURE: 65 MMHG | OXYGEN SATURATION: 97 % | TEMPERATURE: 99.6 F | WEIGHT: 207 LBS | BODY MASS INDEX: 31.37 KG/M2

## 2021-01-01 DIAGNOSIS — E87.6 HYPOKALEMIA: ICD-10-CM

## 2021-01-01 DIAGNOSIS — I47.20 V-TACH: ICD-10-CM

## 2021-01-01 DIAGNOSIS — D50.8 OTHER IRON DEFICIENCY ANEMIA: Primary | ICD-10-CM

## 2021-01-01 DIAGNOSIS — D64.9 SEVERE ANEMIA: Primary | ICD-10-CM

## 2021-01-01 DIAGNOSIS — I46.9 CARDIAC ARREST (HCC): Primary | ICD-10-CM

## 2021-01-01 DIAGNOSIS — D50.0 IRON DEFICIENCY ANEMIA DUE TO CHRONIC BLOOD LOSS: Primary | ICD-10-CM

## 2021-01-01 DIAGNOSIS — I95.9 HYPOTENSION, UNSPECIFIED HYPOTENSION TYPE: ICD-10-CM

## 2021-01-01 DIAGNOSIS — G93.40 ENCEPHALOPATHY ACUTE: ICD-10-CM

## 2021-01-01 DIAGNOSIS — G93.1 ANOXIC BRAIN INJURY (HCC): ICD-10-CM

## 2021-01-01 LAB
ABO + RH BLD: NORMAL
ALBUMIN SERPL-MCNC: 1.8 G/DL (ref 3.5–5)
ALBUMIN SERPL-MCNC: 2.1 G/DL (ref 3.5–5)
ALBUMIN SERPL-MCNC: 3.4 G/DL (ref 3.5–5)
ALBUMIN/GLOB SERPL: 0.5 {RATIO} (ref 1.1–2.2)
ALBUMIN/GLOB SERPL: 0.6 {RATIO} (ref 1.1–2.2)
ALBUMIN/GLOB SERPL: 1 {RATIO} (ref 1.1–2.2)
ALP SERPL-CCNC: 70 U/L (ref 45–117)
ALP SERPL-CCNC: 71 U/L (ref 45–117)
ALP SERPL-CCNC: 92 U/L (ref 45–117)
ALT SERPL-CCNC: 25 U/L (ref 12–78)
ALT SERPL-CCNC: 78 U/L (ref 12–78)
ALT SERPL-CCNC: 95 U/L (ref 12–78)
ANION GAP BLD CALC-SCNC: 20 MMOL/L (ref 10–20)
ANION GAP SERPL CALC-SCNC: 10 MMOL/L (ref 5–15)
ANION GAP SERPL CALC-SCNC: 12 MMOL/L (ref 5–15)
ANION GAP SERPL CALC-SCNC: 20 MMOL/L (ref 5–15)
ANION GAP SERPL CALC-SCNC: 5 MMOL/L (ref 5–15)
APTT PPP: 28.3 SEC (ref 22.1–31)
ARTERIAL PATENCY WRIST A: YES
ARTERIAL PATENCY WRIST A: YES
AST SERPL-CCNC: 107 U/L (ref 15–37)
AST SERPL-CCNC: 18 U/L (ref 15–37)
AST SERPL-CCNC: 74 U/L (ref 15–37)
ATRIAL RATE: 46 BPM
BASE DEFICIT BLDA-SCNC: 7.5 MMOL/L
BASE EXCESS BLDA CALC-SCNC: 0.7 MMOL/L
BASOPHILS # BLD: 0 K/UL (ref 0–0.1)
BASOPHILS # BLD: 0 K/UL (ref 0–0.1)
BASOPHILS # BLD: 0.1 K/UL (ref 0–0.1)
BASOPHILS # BLD: 0.1 K/UL (ref 0–0.1)
BASOPHILS NFR BLD: 0 % (ref 0–1)
BASOPHILS NFR BLD: 1 % (ref 0–1)
BDY SITE: ABNORMAL
BDY SITE: ABNORMAL
BILIRUB DIRECT SERPL-MCNC: 0.4 MG/DL (ref 0–0.2)
BILIRUB SERPL-MCNC: 0.8 MG/DL (ref 0.2–1)
BILIRUB SERPL-MCNC: 1.1 MG/DL (ref 0.2–1)
BILIRUB SERPL-MCNC: 1.4 MG/DL (ref 0.2–1)
BLD PROD TYP BPU: NORMAL
BLOOD GROUP ANTIBODIES SERPL: NORMAL
BNP SERPL-MCNC: 3819 PG/ML
BPU ID: NORMAL
BUN SERPL-MCNC: 13 MG/DL (ref 6–20)
BUN SERPL-MCNC: 27 MG/DL (ref 6–20)
BUN SERPL-MCNC: 34 MG/DL (ref 6–20)
BUN SERPL-MCNC: 39 MG/DL (ref 6–20)
BUN/CREAT SERPL: 12 (ref 12–20)
BUN/CREAT SERPL: 15 (ref 12–20)
BUN/CREAT SERPL: 18 (ref 12–20)
BUN/CREAT SERPL: 18 (ref 12–20)
C DIFF GDH STL QL: NEGATIVE
C DIFF TOX A+B STL QL IA: NEGATIVE
CA-I BLD-MCNC: 1.3 MMOL/L (ref 1.12–1.32)
CA-I BLD-SCNC: 1.08 MMOL/L (ref 1.13–1.32)
CALCIUM SERPL-MCNC: 7.3 MG/DL (ref 8.5–10.1)
CALCIUM SERPL-MCNC: 7.9 MG/DL (ref 8.5–10.1)
CALCIUM SERPL-MCNC: 8.3 MG/DL (ref 8.5–10.1)
CALCIUM SERPL-MCNC: 8.4 MG/DL (ref 8.5–10.1)
CALCULATED R AXIS, ECG10: -50 DEGREES
CALCULATED T AXIS, ECG11: 132 DEGREES
CHLORIDE BLD-SCNC: 95 MMOL/L (ref 100–108)
CHLORIDE SERPL-SCNC: 101 MMOL/L (ref 97–108)
CHLORIDE SERPL-SCNC: 102 MMOL/L (ref 97–108)
CHLORIDE SERPL-SCNC: 96 MMOL/L (ref 97–108)
CHLORIDE SERPL-SCNC: 97 MMOL/L (ref 97–108)
CO2 BLD-SCNC: 24 MMOL/L (ref 19–24)
CO2 SERPL-SCNC: 21 MMOL/L (ref 21–32)
CO2 SERPL-SCNC: 25 MMOL/L (ref 21–32)
CO2 SERPL-SCNC: 25 MMOL/L (ref 21–32)
CO2 SERPL-SCNC: 31 MMOL/L (ref 21–32)
COMMENT, HOLDF: NORMAL
COVID-19 RAPID TEST, COVR: NOT DETECTED
CREAT SERPL-MCNC: 1.13 MG/DL (ref 0.7–1.3)
CREAT SERPL-MCNC: 1.77 MG/DL (ref 0.7–1.3)
CREAT SERPL-MCNC: 1.87 MG/DL (ref 0.7–1.3)
CREAT SERPL-MCNC: 2.17 MG/DL (ref 0.7–1.3)
CREAT UR-MCNC: 1.7 MG/DL (ref 0.6–1.3)
CROSSMATCH RESULT,%XM: NORMAL
DIAGNOSIS, 93000: NORMAL
DIFFERENTIAL METHOD BLD: ABNORMAL
EOSINOPHIL # BLD: 0 K/UL (ref 0–0.4)
EOSINOPHIL # BLD: 0.2 K/UL (ref 0–0.4)
EOSINOPHIL # BLD: 0.9 K/UL (ref 0–0.4)
EOSINOPHIL # BLD: 1.2 K/UL (ref 0–0.4)
EOSINOPHIL NFR BLD: 0 % (ref 0–7)
EOSINOPHIL NFR BLD: 1 % (ref 0–7)
EOSINOPHIL NFR BLD: 14 % (ref 0–7)
EOSINOPHIL NFR BLD: 2 % (ref 0–7)
ERYTHROCYTE [DISTWIDTH] IN BLOOD BY AUTOMATED COUNT: 15.9 % (ref 11.5–14.5)
ERYTHROCYTE [DISTWIDTH] IN BLOOD BY AUTOMATED COUNT: 15.9 % (ref 11.5–14.5)
ERYTHROCYTE [DISTWIDTH] IN BLOOD BY AUTOMATED COUNT: 16.1 % (ref 11.5–14.5)
ERYTHROCYTE [DISTWIDTH] IN BLOOD BY AUTOMATED COUNT: 17.5 % (ref 11.5–14.5)
EST. AVERAGE GLUCOSE BLD GHB EST-MCNC: 108 MG/DL
FIO2 ON VENT: 50 %
FIO2 ON VENT: 60 %
GAS FLOW.O2 SETTING OXYMISER: 15 L/MIN
GAS FLOW.O2 SETTING OXYMISER: 18 L/MIN
GLOBULIN SER CALC-MCNC: 3.3 G/DL (ref 2–4)
GLOBULIN SER CALC-MCNC: 3.6 G/DL (ref 2–4)
GLOBULIN SER CALC-MCNC: 3.9 G/DL (ref 2–4)
GLUCOSE BLD STRIP.AUTO-MCNC: 111 MG/DL (ref 65–117)
GLUCOSE BLD STRIP.AUTO-MCNC: 127 MG/DL (ref 65–117)
GLUCOSE BLD STRIP.AUTO-MCNC: 177 MG/DL (ref 65–117)
GLUCOSE BLD STRIP.AUTO-MCNC: 227 MG/DL (ref 65–117)
GLUCOSE BLD STRIP.AUTO-MCNC: 297 MG/DL (ref 74–106)
GLUCOSE BLD STRIP.AUTO-MCNC: 93 MG/DL (ref 65–117)
GLUCOSE SERPL-MCNC: 102 MG/DL (ref 65–100)
GLUCOSE SERPL-MCNC: 105 MG/DL (ref 65–100)
GLUCOSE SERPL-MCNC: 250 MG/DL (ref 65–100)
GLUCOSE SERPL-MCNC: 265 MG/DL (ref 65–100)
HBA1C MFR BLD: 5.4 % (ref 4–5.6)
HCO3 BLDA-SCNC: 19 MMOL/L (ref 22–26)
HCO3 BLDA-SCNC: 24 MMOL/L (ref 22–26)
HCT VFR BLD AUTO: 20.5 % (ref 36.6–50.3)
HCT VFR BLD AUTO: 33.3 % (ref 36.6–50.3)
HCT VFR BLD AUTO: 33.3 % (ref 36.6–50.3)
HCT VFR BLD AUTO: 34.8 % (ref 36.6–50.3)
HEMOCCULT STL QL: NEGATIVE
HGB BLD-MCNC: 10.8 G/DL (ref 12.1–17)
HGB BLD-MCNC: 10.9 G/DL (ref 12.1–17)
HGB BLD-MCNC: 11.3 G/DL (ref 12.1–17)
HGB BLD-MCNC: 5.5 G/DL (ref 12.1–17)
HISTORY CHECKED?,CKHIST: NORMAL
IMM GRANULOCYTES # BLD AUTO: 0 K/UL (ref 0–0.04)
IMM GRANULOCYTES # BLD AUTO: 0.1 K/UL (ref 0–0.04)
IMM GRANULOCYTES # BLD AUTO: 0.1 K/UL (ref 0–0.04)
IMM GRANULOCYTES # BLD AUTO: 0.3 K/UL (ref 0–0.04)
IMM GRANULOCYTES NFR BLD AUTO: 0 % (ref 0–0.5)
IMM GRANULOCYTES NFR BLD AUTO: 1 % (ref 0–0.5)
INR PPP: 1.1 (ref 0.9–1.1)
INTERPRETATION: NORMAL
IRON SATN MFR SERPL: 2 % (ref 20–50)
IRON SERPL-MCNC: 9 UG/DL (ref 35–150)
LACTATE BLD-SCNC: 14.05 MMOL/L (ref 0.4–2)
LACTATE SERPL-SCNC: 1.2 MMOL/L (ref 0.4–2)
LACTATE SERPL-SCNC: 2.8 MMOL/L (ref 0.4–2)
LACTATE SERPL-SCNC: 3 MMOL/L (ref 0.4–2)
LYMPHOCYTES # BLD: 0.5 K/UL (ref 0.8–3.5)
LYMPHOCYTES # BLD: 0.8 K/UL (ref 0.8–3.5)
LYMPHOCYTES # BLD: 1.1 K/UL (ref 0.8–3.5)
LYMPHOCYTES # BLD: 4.7 K/UL (ref 0.8–3.5)
LYMPHOCYTES NFR BLD: 11 % (ref 12–49)
LYMPHOCYTES NFR BLD: 2 % (ref 12–49)
LYMPHOCYTES NFR BLD: 6 % (ref 12–49)
LYMPHOCYTES NFR BLD: 9 % (ref 12–49)
MAGNESIUM SERPL-MCNC: 1.7 MG/DL (ref 1.6–2.4)
MAGNESIUM SERPL-MCNC: 1.8 MG/DL (ref 1.6–2.4)
MAGNESIUM SERPL-MCNC: 2.6 MG/DL (ref 1.6–2.4)
MCH RBC QN AUTO: 16.3 PG (ref 26–34)
MCH RBC QN AUTO: 25.8 PG (ref 26–34)
MCH RBC QN AUTO: 26 PG (ref 26–34)
MCH RBC QN AUTO: 26.2 PG (ref 26–34)
MCHC RBC AUTO-ENTMCNC: 26.8 G/DL (ref 30–36.5)
MCHC RBC AUTO-ENTMCNC: 31 G/DL (ref 30–36.5)
MCHC RBC AUTO-ENTMCNC: 32.7 G/DL (ref 30–36.5)
MCHC RBC AUTO-ENTMCNC: 33.9 G/DL (ref 30–36.5)
MCV RBC AUTO: 60.7 FL (ref 80–99)
MCV RBC AUTO: 77.3 FL (ref 80–99)
MCV RBC AUTO: 78.9 FL (ref 80–99)
MCV RBC AUTO: 83.7 FL (ref 80–99)
METAMYELOCYTES NFR BLD MANUAL: 2 %
MONOCYTES # BLD: 0.8 K/UL (ref 0–1)
MONOCYTES # BLD: 1.2 K/UL (ref 0–1)
MONOCYTES # BLD: 1.6 K/UL (ref 0–1)
MONOCYTES # BLD: 3.4 K/UL (ref 0–1)
MONOCYTES NFR BLD: 6 % (ref 5–13)
MONOCYTES NFR BLD: 7 % (ref 5–13)
MONOCYTES NFR BLD: 8 % (ref 5–13)
MONOCYTES NFR BLD: 9 % (ref 5–13)
MYELOCYTES NFR BLD MANUAL: 1 %
NEUTS BAND NFR BLD MANUAL: 12 %
NEUTS SEG # BLD: 15.1 K/UL (ref 1.8–8)
NEUTS SEG # BLD: 23.5 K/UL (ref 1.8–8)
NEUTS SEG # BLD: 32.5 K/UL (ref 1.8–8)
NEUTS SEG # BLD: 5.5 K/UL (ref 1.8–8)
NEUTS SEG NFR BLD: 64 % (ref 32–75)
NEUTS SEG NFR BLD: 66 % (ref 32–75)
NEUTS SEG NFR BLD: 85 % (ref 32–75)
NEUTS SEG NFR BLD: 91 % (ref 32–75)
NRBC # BLD: 0 K/UL (ref 0–0.01)
NRBC BLD-RTO: 0 PER 100 WBC
PATH REV BLD -IMP: NORMAL
PATH REV BLD -IMP: NORMAL
PCO2 BLDA: 36 MMHG (ref 35–45)
PCO2 BLDA: 42 MMHG (ref 35–45)
PEEP RESPIRATORY: 5 CM[H2O]
PEEP RESPIRATORY: 5 CM[H2O]
PH BLDA: 7.28 [PH] (ref 7.35–7.45)
PH BLDA: 7.45 [PH] (ref 7.35–7.45)
PHOSPHATE SERPL-MCNC: 3.9 MG/DL (ref 2.6–4.7)
PHOSPHATE SERPL-MCNC: 5 MG/DL (ref 2.6–4.7)
PHOSPHATE SERPL-MCNC: 8.8 MG/DL (ref 2.6–4.7)
PLATELET # BLD AUTO: 183 K/UL (ref 150–400)
PLATELET # BLD AUTO: 193 K/UL (ref 150–400)
PLATELET # BLD AUTO: 198 K/UL (ref 150–400)
PLATELET # BLD AUTO: 240 K/UL (ref 150–400)
PLATELET COMMENTS,PCOM: ABNORMAL
PMV BLD AUTO: 11.4 FL (ref 8.9–12.9)
PMV BLD AUTO: 11.9 FL (ref 8.9–12.9)
PMV BLD AUTO: 12 FL (ref 8.9–12.9)
PO2 BLDA: 112 MMHG (ref 80–100)
PO2 BLDA: 222 MMHG (ref 80–100)
POTASSIUM BLD-SCNC: 2.6 MMOL/L (ref 3.5–5.5)
POTASSIUM SERPL-SCNC: 2.6 MMOL/L (ref 3.5–5.1)
POTASSIUM SERPL-SCNC: 2.7 MMOL/L (ref 3.5–5.1)
POTASSIUM SERPL-SCNC: 2.9 MMOL/L (ref 3.5–5.1)
POTASSIUM SERPL-SCNC: 3.2 MMOL/L (ref 3.5–5.1)
PRESSURE SUPPORT SETTING VENT: 10 CM[H2O]
PROT SERPL-MCNC: 5.7 G/DL (ref 6.4–8.2)
PROT SERPL-MCNC: 5.7 G/DL (ref 6.4–8.2)
PROT SERPL-MCNC: 6.7 G/DL (ref 6.4–8.2)
PROTHROMBIN TIME: 11.3 SEC (ref 9–11.1)
Q-T INTERVAL, ECG07: 504 MS
QRS DURATION, ECG06: 130 MS
QTC CALCULATION (BEZET), ECG08: 574 MS
RBC # BLD AUTO: 3.38 M/UL (ref 4.1–5.7)
RBC # BLD AUTO: 4.16 M/UL (ref 4.1–5.7)
RBC # BLD AUTO: 4.22 M/UL (ref 4.1–5.7)
RBC # BLD AUTO: 4.31 M/UL (ref 4.1–5.7)
RBC MORPH BLD: ABNORMAL
SAMPLES BEING HELD,HOLD: NORMAL
SAO2 % BLD: 98 % (ref 92–97)
SAO2 % BLD: 99 % (ref 92–97)
SAO2% DEVICE SAO2% SENSOR NAME: ABNORMAL
SAO2% DEVICE SAO2% SENSOR NAME: ABNORMAL
SERVICE CMNT-IMP: ABNORMAL
SERVICE CMNT-IMP: NORMAL
SERVICE CMNT-IMP: NORMAL
SODIUM BLD-SCNC: 138 MMOL/L (ref 136–145)
SODIUM SERPL-SCNC: 133 MMOL/L (ref 136–145)
SODIUM SERPL-SCNC: 136 MMOL/L (ref 136–145)
SODIUM SERPL-SCNC: 138 MMOL/L (ref 136–145)
SODIUM SERPL-SCNC: 138 MMOL/L (ref 136–145)
SOURCE, COVRS: NORMAL
SPECIMEN EXP DATE BLD: NORMAL
SPECIMEN SITE: ABNORMAL
SPECIMEN SITE: ABNORMAL
STATUS OF UNIT,%ST: NORMAL
THERAPEUTIC RANGE,PTTT: NORMAL SECS (ref 58–77)
TIBC SERPL-MCNC: 382 UG/DL (ref 250–450)
TROPONIN I BLD-MCNC: 0.26 NG/ML (ref 0–0.08)
TROPONIN I SERPL-MCNC: 0.43 NG/ML
UNIT DIVISION, %UDIV: 0
VENTILATION MODE VENT: ABNORMAL
VENTRICULAR RATE, ECG03: 78 BPM
VT SETTING VENT: 500 ML
VT SETTING VENT: 500 ML
WBC # BLD AUTO: 17.8 K/UL (ref 4.1–11.1)
WBC # BLD AUTO: 25.9 K/UL (ref 4.1–11.1)
WBC # BLD AUTO: 42.8 K/UL (ref 4.1–11.1)
WBC # BLD AUTO: 8.5 K/UL (ref 4.1–11.1)

## 2021-01-01 PROCEDURE — 83605 ASSAY OF LACTIC ACID: CPT

## 2021-01-01 PROCEDURE — 74011000258 HC RX REV CODE- 258: Performed by: NURSE PRACTITIONER

## 2021-01-01 PROCEDURE — 74011250636 HC RX REV CODE- 250/636: Performed by: INTERNAL MEDICINE

## 2021-01-01 PROCEDURE — 86900 BLOOD TYPING SEROLOGIC ABO: CPT

## 2021-01-01 PROCEDURE — 1101F PT FALLS ASSESS-DOCD LE1/YR: CPT | Performed by: INTERNAL MEDICINE

## 2021-01-01 PROCEDURE — 2709999900 HC NON-CHARGEABLE SUPPLY

## 2021-01-01 PROCEDURE — 96365 THER/PROPH/DIAG IV INF INIT: CPT

## 2021-01-01 PROCEDURE — G8432 DEP SCR NOT DOC, RNG: HCPCS | Performed by: INTERNAL MEDICINE

## 2021-01-01 PROCEDURE — 36600 WITHDRAWAL OF ARTERIAL BLOOD: CPT

## 2021-01-01 PROCEDURE — 51702 INSERT TEMP BLADDER CATH: CPT

## 2021-01-01 PROCEDURE — P9016 RBC LEUKOCYTES REDUCED: HCPCS

## 2021-01-01 PROCEDURE — 82962 GLUCOSE BLOOD TEST: CPT

## 2021-01-01 PROCEDURE — 80047 BASIC METABLC PNL IONIZED CA: CPT

## 2021-01-01 PROCEDURE — 85025 COMPLETE CBC W/AUTO DIFF WBC: CPT

## 2021-01-01 PROCEDURE — 02HV33Z INSERTION OF INFUSION DEVICE INTO SUPERIOR VENA CAVA, PERCUTANEOUS APPROACH: ICD-10-PCS | Performed by: INTERNAL MEDICINE

## 2021-01-01 PROCEDURE — 99284 EMERGENCY DEPT VISIT MOD MDM: CPT

## 2021-01-01 PROCEDURE — 99291 CRITICAL CARE FIRST HOUR: CPT | Performed by: PSYCHIATRY & NEUROLOGY

## 2021-01-01 PROCEDURE — 96374 THER/PROPH/DIAG INJ IV PUSH: CPT

## 2021-01-01 PROCEDURE — 36415 COLL VENOUS BLD VENIPUNCTURE: CPT

## 2021-01-01 PROCEDURE — 82803 BLOOD GASES ANY COMBINATION: CPT

## 2021-01-01 PROCEDURE — 94003 VENT MGMT INPAT SUBQ DAY: CPT

## 2021-01-01 PROCEDURE — G8536 NO DOC ELDER MAL SCRN: HCPCS | Performed by: INTERNAL MEDICINE

## 2021-01-01 PROCEDURE — 71045 X-RAY EXAM CHEST 1 VIEW: CPT

## 2021-01-01 PROCEDURE — 74011000258 HC RX REV CODE- 258: Performed by: EMERGENCY MEDICINE

## 2021-01-01 PROCEDURE — 74011250636 HC RX REV CODE- 250/636: Performed by: EMERGENCY MEDICINE

## 2021-01-01 PROCEDURE — 96376 TX/PRO/DX INJ SAME DRUG ADON: CPT

## 2021-01-01 PROCEDURE — 80076 HEPATIC FUNCTION PANEL: CPT

## 2021-01-01 PROCEDURE — 99221 1ST HOSP IP/OBS SF/LOW 40: CPT | Performed by: NURSE PRACTITIONER

## 2021-01-01 PROCEDURE — 3017F COLORECTAL CA SCREEN DOC REV: CPT | Performed by: INTERNAL MEDICINE

## 2021-01-01 PROCEDURE — 77030018729 HC ELECTRD DEFIB PAD CARD -B

## 2021-01-01 PROCEDURE — G8419 CALC BMI OUT NRM PARAM NOF/U: HCPCS | Performed by: INTERNAL MEDICINE

## 2021-01-01 PROCEDURE — G0463 HOSPITAL OUTPT CLINIC VISIT: HCPCS | Performed by: INTERNAL MEDICINE

## 2021-01-01 PROCEDURE — 84484 ASSAY OF TROPONIN QUANT: CPT

## 2021-01-01 PROCEDURE — 99204 OFFICE O/P NEW MOD 45 MIN: CPT | Performed by: INTERNAL MEDICINE

## 2021-01-01 PROCEDURE — 71275 CT ANGIOGRAPHY CHEST: CPT

## 2021-01-01 PROCEDURE — 80053 COMPREHEN METABOLIC PANEL: CPT

## 2021-01-01 PROCEDURE — 87635 SARS-COV-2 COVID-19 AMP PRB: CPT

## 2021-01-01 PROCEDURE — 87324 CLOSTRIDIUM AG IA: CPT

## 2021-01-01 PROCEDURE — 5A1945Z RESPIRATORY VENTILATION, 24-96 CONSECUTIVE HOURS: ICD-10-PCS | Performed by: EMERGENCY MEDICINE

## 2021-01-01 PROCEDURE — 95816 EEG AWAKE AND DROWSY: CPT | Performed by: PSYCHIATRY & NEUROLOGY

## 2021-01-01 PROCEDURE — 93005 ELECTROCARDIOGRAM TRACING: CPT

## 2021-01-01 PROCEDURE — 74011250637 HC RX REV CODE- 250/637: Performed by: INTERNAL MEDICINE

## 2021-01-01 PROCEDURE — 74011250636 HC RX REV CODE- 250/636: Performed by: NURSE PRACTITIONER

## 2021-01-01 PROCEDURE — G8752 SYS BP LESS 140: HCPCS | Performed by: INTERNAL MEDICINE

## 2021-01-01 PROCEDURE — 83735 ASSAY OF MAGNESIUM: CPT

## 2021-01-01 PROCEDURE — P9045 ALBUMIN (HUMAN), 5%, 250 ML: HCPCS | Performed by: NURSE PRACTITIONER

## 2021-01-01 PROCEDURE — 83540 ASSAY OF IRON: CPT

## 2021-01-01 PROCEDURE — 36430 TRANSFUSION BLD/BLD COMPNT: CPT

## 2021-01-01 PROCEDURE — 80048 BASIC METABOLIC PNL TOTAL CA: CPT

## 2021-01-01 PROCEDURE — 74011250636 HC RX REV CODE- 250/636

## 2021-01-01 PROCEDURE — 74011000250 HC RX REV CODE- 250: Performed by: NURSE PRACTITIONER

## 2021-01-01 PROCEDURE — 87040 BLOOD CULTURE FOR BACTERIA: CPT

## 2021-01-01 PROCEDURE — 72020 X-RAY EXAM OF SPINE 1 VIEW: CPT

## 2021-01-01 PROCEDURE — 94002 VENT MGMT INPAT INIT DAY: CPT

## 2021-01-01 PROCEDURE — 83880 ASSAY OF NATRIURETIC PEPTIDE: CPT

## 2021-01-01 PROCEDURE — 82272 OCCULT BLD FECES 1-3 TESTS: CPT

## 2021-01-01 PROCEDURE — C1751 CATH, INF, PER/CENT/MIDLINE: HCPCS

## 2021-01-01 PROCEDURE — 70450 CT HEAD/BRAIN W/O DYE: CPT

## 2021-01-01 PROCEDURE — 84100 ASSAY OF PHOSPHORUS: CPT

## 2021-01-01 PROCEDURE — 65610000006 HC RM INTENSIVE CARE

## 2021-01-01 PROCEDURE — 65270000029 HC RM PRIVATE

## 2021-01-01 PROCEDURE — 99291 CRITICAL CARE FIRST HOUR: CPT

## 2021-01-01 PROCEDURE — 99233 SBSQ HOSP IP/OBS HIGH 50: CPT | Performed by: PSYCHIATRY & NEUROLOGY

## 2021-01-01 PROCEDURE — 74011250637 HC RX REV CODE- 250/637: Performed by: NURSE PRACTITIONER

## 2021-01-01 PROCEDURE — 83036 HEMOGLOBIN GLYCOSYLATED A1C: CPT

## 2021-01-01 PROCEDURE — G8427 DOCREV CUR MEDS BY ELIG CLIN: HCPCS | Performed by: INTERNAL MEDICINE

## 2021-01-01 PROCEDURE — 85610 PROTHROMBIN TIME: CPT

## 2021-01-01 PROCEDURE — C9113 INJ PANTOPRAZOLE SODIUM, VIA: HCPCS | Performed by: NURSE PRACTITIONER

## 2021-01-01 PROCEDURE — 95816 EEG AWAKE AND DROWSY: CPT | Performed by: NURSE PRACTITIONER

## 2021-01-01 PROCEDURE — 74177 CT ABD & PELVIS W/CONTRAST: CPT

## 2021-01-01 PROCEDURE — 74011000636 HC RX REV CODE- 636

## 2021-01-01 PROCEDURE — 74011636637 HC RX REV CODE- 636/637: Performed by: NURSE PRACTITIONER

## 2021-01-01 PROCEDURE — G8754 DIAS BP LESS 90: HCPCS | Performed by: INTERNAL MEDICINE

## 2021-01-01 PROCEDURE — 99231 SBSQ HOSP IP/OBS SF/LOW 25: CPT | Performed by: NURSE PRACTITIONER

## 2021-01-01 PROCEDURE — 74011000636 HC RX REV CODE- 636: Performed by: INTERNAL MEDICINE

## 2021-01-01 PROCEDURE — 85730 THROMBOPLASTIN TIME PARTIAL: CPT

## 2021-01-01 RX ORDER — MAGNESIUM SULFATE 100 %
4 CRYSTALS MISCELLANEOUS AS NEEDED
Status: DISCONTINUED | OUTPATIENT
Start: 2021-01-01 | End: 2021-01-01

## 2021-01-01 RX ORDER — FUROSEMIDE 40 MG/1
TABLET ORAL DAILY
COMMUNITY

## 2021-01-01 RX ORDER — PROPOFOL 10 MG/ML
50 INJECTION, EMULSION INTRAVENOUS
Status: COMPLETED | OUTPATIENT
Start: 2021-01-01 | End: 2021-01-01

## 2021-01-01 RX ORDER — ALBUMIN HUMAN 50 G/1000ML
12.5 SOLUTION INTRAVENOUS ONCE
Status: COMPLETED | OUTPATIENT
Start: 2021-01-01 | End: 2021-01-01

## 2021-01-01 RX ORDER — MIDAZOLAM HYDROCHLORIDE 1 MG/ML
2 INJECTION, SOLUTION INTRAMUSCULAR; INTRAVENOUS ONCE
Status: COMPLETED | OUTPATIENT
Start: 2021-01-01 | End: 2021-01-01

## 2021-01-01 RX ORDER — HEPARIN SODIUM 5000 [USP'U]/ML
5000 INJECTION, SOLUTION INTRAVENOUS; SUBCUTANEOUS EVERY 8 HOURS
Status: DISCONTINUED | OUTPATIENT
Start: 2021-01-01 | End: 2021-01-01

## 2021-01-01 RX ORDER — BALSAM PERU/CASTOR OIL
OINTMENT (GRAM) TOPICAL 2 TIMES DAILY
Status: DISCONTINUED | OUTPATIENT
Start: 2021-01-01 | End: 2021-01-01 | Stop reason: HOSPADM

## 2021-01-01 RX ORDER — FERROUS SULFATE TAB 325 MG (65 MG ELEMENTAL FE) 325 (65 FE) MG
TAB ORAL
COMMUNITY
Start: 2021-01-01

## 2021-01-01 RX ORDER — METHOCARBAMOL 500 MG/1
TABLET, FILM COATED ORAL
COMMUNITY
Start: 2021-01-01

## 2021-01-01 RX ORDER — VANCOMYCIN/0.9 % SOD CHLORIDE 1.5G/250ML
1500 PLASTIC BAG, INJECTION (ML) INTRAVENOUS EVERY 12 HOURS
Status: DISCONTINUED | OUTPATIENT
Start: 2021-01-01 | End: 2021-01-01 | Stop reason: DRUGHIGH

## 2021-01-01 RX ORDER — IBUPROFEN 800 MG/1
800 TABLET ORAL
COMMUNITY

## 2021-01-01 RX ORDER — ACETAMINOPHEN 325 MG/1
650 TABLET ORAL
Status: DISCONTINUED | OUTPATIENT
Start: 2021-01-01 | End: 2021-01-01

## 2021-01-01 RX ORDER — DIPHENHYDRAMINE HYDROCHLORIDE 50 MG/ML
50 INJECTION, SOLUTION INTRAMUSCULAR; INTRAVENOUS AS NEEDED
Status: CANCELLED
Start: 2021-01-01

## 2021-01-01 RX ORDER — LOSARTAN POTASSIUM 25 MG/1
TABLET ORAL DAILY
COMMUNITY

## 2021-01-01 RX ORDER — ACETAMINOPHEN 650 MG/1
650 SUPPOSITORY RECTAL
Status: DISCONTINUED | OUTPATIENT
Start: 2021-01-01 | End: 2021-01-01

## 2021-01-01 RX ORDER — ACETAMINOPHEN 325 MG/1
650 TABLET ORAL AS NEEDED
Status: CANCELLED
Start: 2021-01-01

## 2021-01-01 RX ORDER — ALBUTEROL SULFATE 0.83 MG/ML
2.5 SOLUTION RESPIRATORY (INHALATION) AS NEEDED
Status: CANCELLED
Start: 2021-01-01

## 2021-01-01 RX ORDER — DEXTROSE 50 % IN WATER (D50W) INTRAVENOUS SYRINGE
12.5-25 AS NEEDED
Status: DISCONTINUED | OUTPATIENT
Start: 2021-01-01 | End: 2021-01-01 | Stop reason: SDUPTHER

## 2021-01-01 RX ORDER — POTASSIUM CHLORIDE 29.8 MG/ML
20 INJECTION INTRAVENOUS
Status: DISPENSED | OUTPATIENT
Start: 2021-01-01 | End: 2021-01-01

## 2021-01-01 RX ORDER — DIPHENHYDRAMINE HYDROCHLORIDE 50 MG/ML
25 INJECTION, SOLUTION INTRAMUSCULAR; INTRAVENOUS AS NEEDED
Status: CANCELLED
Start: 2021-01-01

## 2021-01-01 RX ORDER — SODIUM CHLORIDE 0.9 % (FLUSH) 0.9 %
5-40 SYRINGE (ML) INJECTION EVERY 8 HOURS
Status: DISCONTINUED | OUTPATIENT
Start: 2021-01-01 | End: 2021-01-01

## 2021-01-01 RX ORDER — ENOXAPARIN SODIUM 100 MG/ML
40 INJECTION SUBCUTANEOUS DAILY
Status: DISCONTINUED | OUTPATIENT
Start: 2021-01-01 | End: 2021-01-01

## 2021-01-01 RX ORDER — POTASSIUM CHLORIDE 7.45 MG/ML
10 INJECTION INTRAVENOUS
Status: DISPENSED | OUTPATIENT
Start: 2021-01-01 | End: 2021-01-01

## 2021-01-01 RX ORDER — INSULIN LISPRO 100 [IU]/ML
10 INJECTION, SOLUTION INTRAVENOUS; SUBCUTANEOUS
Status: DISCONTINUED | OUTPATIENT
Start: 2021-01-01 | End: 2021-01-01

## 2021-01-01 RX ORDER — ONDANSETRON 2 MG/ML
8 INJECTION INTRAMUSCULAR; INTRAVENOUS AS NEEDED
Status: CANCELLED | OUTPATIENT
Start: 2021-01-01

## 2021-01-01 RX ORDER — CHLORHEXIDINE GLUCONATE 0.12 MG/ML
15 RINSE ORAL EVERY 12 HOURS
Status: DISCONTINUED | OUTPATIENT
Start: 2021-01-01 | End: 2021-01-01 | Stop reason: HOSPADM

## 2021-01-01 RX ORDER — POTASSIUM CHLORIDE 29.8 MG/ML
20 INJECTION INTRAVENOUS ONCE
Status: COMPLETED | OUTPATIENT
Start: 2021-01-01 | End: 2021-01-01

## 2021-01-01 RX ORDER — BUMETANIDE 1 MG/1
TABLET ORAL
Status: ON HOLD | COMMUNITY
Start: 2021-01-01 | End: 2021-01-01

## 2021-01-01 RX ORDER — POTASSIUM CHLORIDE 750 MG/1
10 TABLET, FILM COATED, EXTENDED RELEASE ORAL 2 TIMES DAILY
Qty: 30 TAB | Refills: 0 | Status: ON HOLD | OUTPATIENT
Start: 2021-01-01 | End: 2021-01-01

## 2021-01-01 RX ORDER — VANCOMYCIN 2 GRAM/500 ML IN 0.9 % SODIUM CHLORIDE INTRAVENOUS
2000 ONCE
Status: COMPLETED | OUTPATIENT
Start: 2021-01-01 | End: 2021-01-01

## 2021-01-01 RX ORDER — LORAZEPAM 2 MG/ML
1-2 INJECTION INTRAMUSCULAR
Status: DISCONTINUED | OUTPATIENT
Start: 2021-01-01 | End: 2021-01-01 | Stop reason: HOSPADM

## 2021-01-01 RX ORDER — POTASSIUM CHLORIDE 7.45 MG/ML
10 INJECTION INTRAVENOUS
Status: COMPLETED | OUTPATIENT
Start: 2021-01-01 | End: 2021-01-01

## 2021-01-01 RX ORDER — HYDROCORTISONE SODIUM SUCCINATE 100 MG/2ML
100 INJECTION, POWDER, FOR SOLUTION INTRAMUSCULAR; INTRAVENOUS AS NEEDED
Status: CANCELLED | OUTPATIENT
Start: 2021-01-01

## 2021-01-01 RX ORDER — POTASSIUM CHLORIDE 7.45 MG/ML
10 INJECTION INTRAVENOUS
Status: DISCONTINUED | OUTPATIENT
Start: 2021-01-01 | End: 2021-01-01

## 2021-01-01 RX ORDER — SODIUM CHLORIDE 9 MG/ML
250 INJECTION, SOLUTION INTRAVENOUS AS NEEDED
Status: DISCONTINUED | OUTPATIENT
Start: 2021-01-01 | End: 2021-01-01 | Stop reason: HOSPADM

## 2021-01-01 RX ORDER — MAGNESIUM SULFATE 100 %
4 CRYSTALS MISCELLANEOUS AS NEEDED
Status: DISCONTINUED | OUTPATIENT
Start: 2021-01-01 | End: 2021-01-01 | Stop reason: SDUPTHER

## 2021-01-01 RX ORDER — ALBUMIN HUMAN 50 G/1000ML
SOLUTION INTRAVENOUS
Status: DISPENSED
Start: 2021-01-01 | End: 2021-01-01

## 2021-01-01 RX ORDER — SODIUM CHLORIDE 0.9 % (FLUSH) 0.9 %
5-10 SYRINGE (ML) INJECTION AS NEEDED
Status: DISCONTINUED | OUTPATIENT
Start: 2021-01-01 | End: 2021-01-01 | Stop reason: HOSPADM

## 2021-01-01 RX ORDER — SCOLOPAMINE TRANSDERMAL SYSTEM 1 MG/1
1 PATCH, EXTENDED RELEASE TRANSDERMAL
Status: DISCONTINUED | OUTPATIENT
Start: 2021-01-01 | End: 2021-01-01 | Stop reason: HOSPADM

## 2021-01-01 RX ORDER — GLYCOPYRROLATE 0.2 MG/ML
0.2 INJECTION INTRAMUSCULAR; INTRAVENOUS
Status: DISCONTINUED | OUTPATIENT
Start: 2021-01-01 | End: 2021-01-01 | Stop reason: HOSPADM

## 2021-01-01 RX ORDER — MAGNESIUM SULFATE HEPTAHYDRATE 40 MG/ML
2 INJECTION, SOLUTION INTRAVENOUS
Status: DISPENSED | OUTPATIENT
Start: 2021-01-01 | End: 2021-01-01

## 2021-01-01 RX ORDER — EPINEPHRINE 1 MG/ML
0.3 INJECTION, SOLUTION, CONCENTRATE INTRAVENOUS AS NEEDED
Status: CANCELLED | OUTPATIENT
Start: 2021-01-01

## 2021-01-01 RX ORDER — PROPOFOL 10 MG/ML
INJECTION, EMULSION INTRAVENOUS
Status: COMPLETED
Start: 2021-01-01 | End: 2021-01-01

## 2021-01-01 RX ORDER — MAGNESIUM SULFATE 1 G/100ML
1 INJECTION INTRAVENOUS ONCE
Status: COMPLETED | OUTPATIENT
Start: 2021-01-01 | End: 2021-01-01

## 2021-01-01 RX ORDER — HYDROMORPHONE HYDROCHLORIDE 1 MG/ML
.5-1 INJECTION, SOLUTION INTRAMUSCULAR; INTRAVENOUS; SUBCUTANEOUS
Status: DISCONTINUED | OUTPATIENT
Start: 2021-01-01 | End: 2021-01-01 | Stop reason: HOSPADM

## 2021-01-01 RX ORDER — POTASSIUM CHLORIDE 14.9 MG/ML
10 INJECTION INTRAVENOUS
Status: COMPLETED | OUTPATIENT
Start: 2021-01-01 | End: 2021-01-01

## 2021-01-01 RX ORDER — PROPOFOL 10 MG/ML
0-50 VIAL (ML) INTRAVENOUS
Status: DISCONTINUED | OUTPATIENT
Start: 2021-01-01 | End: 2021-01-01

## 2021-01-01 RX ORDER — METRONIDAZOLE 500 MG/100ML
500 INJECTION, SOLUTION INTRAVENOUS EVERY 8 HOURS
Status: DISCONTINUED | OUTPATIENT
Start: 2021-01-01 | End: 2021-01-01

## 2021-01-01 RX ORDER — CARVEDILOL 3.12 MG/1
3.12 TABLET ORAL 2 TIMES DAILY WITH MEALS
COMMUNITY

## 2021-01-01 RX ORDER — NOREPINEPHRINE BITARTRATE/D5W 8 MG/250ML
.5-3 PLASTIC BAG, INJECTION (ML) INTRAVENOUS
Status: DISCONTINUED | OUTPATIENT
Start: 2021-01-01 | End: 2021-01-01

## 2021-01-01 RX ORDER — BISMUTH SUBSALICYLATE 262 MG
1 TABLET,CHEWABLE ORAL DAILY
COMMUNITY

## 2021-01-01 RX ORDER — ZINC GLUCONATE 10 MG
LOZENGE ORAL
COMMUNITY

## 2021-01-01 RX ORDER — CHLORHEXIDINE GLUCONATE 1.2 MG/ML
15 RINSE ORAL EVERY 12 HOURS
Status: DISCONTINUED | OUTPATIENT
Start: 2021-01-01 | End: 2021-01-01 | Stop reason: SDUPTHER

## 2021-01-01 RX ORDER — MIDAZOLAM HYDROCHLORIDE 1 MG/ML
INJECTION, SOLUTION INTRAMUSCULAR; INTRAVENOUS
Status: COMPLETED
Start: 2021-01-01 | End: 2021-01-01

## 2021-01-01 RX ORDER — POLYETHYLENE GLYCOL 3350 17 G/17G
17 POWDER, FOR SOLUTION ORAL DAILY PRN
Status: DISCONTINUED | OUTPATIENT
Start: 2021-01-01 | End: 2021-01-01

## 2021-01-01 RX ORDER — SODIUM CHLORIDE 0.9 % (FLUSH) 0.9 %
5-40 SYRINGE (ML) INJECTION AS NEEDED
Status: DISCONTINUED | OUTPATIENT
Start: 2021-01-01 | End: 2021-01-01

## 2021-01-01 RX ORDER — DEXTROSE 50 % IN WATER (D50W) INTRAVENOUS SYRINGE
25-50 AS NEEDED
Status: DISCONTINUED | OUTPATIENT
Start: 2021-01-01 | End: 2021-01-01

## 2021-01-01 RX ORDER — SODIUM CHLORIDE 0.9 % (FLUSH) 0.9 %
5-40 SYRINGE (ML) INJECTION AS NEEDED
Status: DISCONTINUED | OUTPATIENT
Start: 2021-01-01 | End: 2021-01-01 | Stop reason: HOSPADM

## 2021-01-01 RX ORDER — INSULIN LISPRO 100 [IU]/ML
INJECTION, SOLUTION INTRAVENOUS; SUBCUTANEOUS EVERY 6 HOURS
Status: DISCONTINUED | OUTPATIENT
Start: 2021-01-01 | End: 2021-01-01

## 2021-01-01 RX ADMIN — IOPAMIDOL 100 ML: 755 INJECTION, SOLUTION INTRAVENOUS at 23:24

## 2021-01-01 RX ADMIN — LORAZEPAM 2 MG: 2 INJECTION INTRAMUSCULAR; INTRAVENOUS at 12:04

## 2021-01-01 RX ADMIN — AMIODARONE HYDROCHLORIDE 1 MG/MIN: 50 INJECTION, SOLUTION INTRAVENOUS at 22:04

## 2021-01-01 RX ADMIN — Medication 10 ML: at 05:30

## 2021-01-01 RX ADMIN — HYDROMORPHONE HYDROCHLORIDE 1 MG: 1 INJECTION, SOLUTION INTRAMUSCULAR; INTRAVENOUS; SUBCUTANEOUS at 12:05

## 2021-01-01 RX ADMIN — GLYCOPYRROLATE 0.2 MG: 0.2 INJECTION INTRAMUSCULAR; INTRAVENOUS at 12:04

## 2021-01-01 RX ADMIN — PIPERACILLIN AND TAZOBACTAM 3.38 G: 3; .375 INJECTION, POWDER, LYOPHILIZED, FOR SOLUTION INTRAVENOUS at 05:33

## 2021-01-01 RX ADMIN — HEPARIN SODIUM 5000 UNITS: 5000 INJECTION INTRAVENOUS; SUBCUTANEOUS at 14:25

## 2021-01-01 RX ADMIN — MAGNESIUM SULFATE HEPTAHYDRATE 2 G: 40 INJECTION, SOLUTION INTRAVENOUS at 04:47

## 2021-01-01 RX ADMIN — MAGNESIUM SULFATE HEPTAHYDRATE 1 G: 1 INJECTION, SOLUTION INTRAVENOUS at 02:34

## 2021-01-01 RX ADMIN — METRONIDAZOLE 500 MG: 500 INJECTION, SOLUTION INTRAVENOUS at 16:03

## 2021-01-01 RX ADMIN — Medication 10 ML: at 21:35

## 2021-01-01 RX ADMIN — HYDROMORPHONE HYDROCHLORIDE 1 MG: 1 INJECTION, SOLUTION INTRAMUSCULAR; INTRAVENOUS; SUBCUTANEOUS at 13:00

## 2021-01-01 RX ADMIN — HEPARIN SODIUM 5000 UNITS: 5000 INJECTION INTRAVENOUS; SUBCUTANEOUS at 05:34

## 2021-01-01 RX ADMIN — POTASSIUM CHLORIDE 10 MEQ: 200 INJECTION, SOLUTION INTRAVENOUS at 00:27

## 2021-01-01 RX ADMIN — LORAZEPAM 2 MG: 2 INJECTION INTRAMUSCULAR; INTRAVENOUS at 12:20

## 2021-01-01 RX ADMIN — PROPOFOL 30 MCG/KG/MIN: 10 INJECTION, EMULSION INTRAVENOUS at 03:51

## 2021-01-01 RX ADMIN — CASTOR OIL AND BALSAM, PERU: 788; 87 OINTMENT TOPICAL at 16:03

## 2021-01-01 RX ADMIN — PROPOFOL 0 MCG/KG/MIN: 10 INJECTION, EMULSION INTRAVENOUS at 04:53

## 2021-01-01 RX ADMIN — POTASSIUM CHLORIDE 20 MEQ: 400 INJECTION, SOLUTION INTRAVENOUS at 04:42

## 2021-01-01 RX ADMIN — POTASSIUM CHLORIDE 10 MEQ: 200 INJECTION, SOLUTION INTRAVENOUS at 01:50

## 2021-01-01 RX ADMIN — FERRIC CARBOXYMALTOSE INJECTION 750 MG: 50 INJECTION, SOLUTION INTRAVENOUS at 15:25

## 2021-01-01 RX ADMIN — PROPOFOL 15 MCG/KG/MIN: 10 INJECTION, EMULSION INTRAVENOUS at 22:35

## 2021-01-01 RX ADMIN — POTASSIUM CHLORIDE 20 MEQ: 400 INJECTION, SOLUTION INTRAVENOUS at 06:57

## 2021-01-01 RX ADMIN — INSULIN LISPRO 2 UNITS: 100 INJECTION, SOLUTION INTRAVENOUS; SUBCUTANEOUS at 06:00

## 2021-01-01 RX ADMIN — Medication 1 AMPULE: at 21:34

## 2021-01-01 RX ADMIN — AMIODARONE HYDROCHLORIDE 0.5 MG/MIN: 50 INJECTION, SOLUTION INTRAVENOUS at 17:11

## 2021-01-01 RX ADMIN — IOPAMIDOL 100 ML: 755 INJECTION, SOLUTION INTRAVENOUS at 02:13

## 2021-01-01 RX ADMIN — HYDROMORPHONE HYDROCHLORIDE 1 MG: 1 INJECTION, SOLUTION INTRAMUSCULAR; INTRAVENOUS; SUBCUTANEOUS at 12:20

## 2021-01-01 RX ADMIN — ALBUMIN (HUMAN) 12.5 G: 12.5 INJECTION, SOLUTION INTRAVENOUS at 06:29

## 2021-01-01 RX ADMIN — PROPOFOL 30 MCG/KG/MIN: 10 INJECTION, EMULSION INTRAVENOUS at 08:37

## 2021-01-01 RX ADMIN — HYDROMORPHONE HYDROCHLORIDE 1 MG: 1 INJECTION, SOLUTION INTRAMUSCULAR; INTRAVENOUS; SUBCUTANEOUS at 13:42

## 2021-01-01 RX ADMIN — SODIUM CHLORIDE 1000 ML/HR: 900 INJECTION, SOLUTION INTRAVENOUS at 22:32

## 2021-01-01 RX ADMIN — Medication 50 MCG/HR: at 23:41

## 2021-01-01 RX ADMIN — HEPARIN SODIUM 5000 UNITS: 5000 INJECTION INTRAVENOUS; SUBCUTANEOUS at 21:35

## 2021-01-01 RX ADMIN — AMIODARONE HYDROCHLORIDE 0.5 MG/MIN: 50 INJECTION, SOLUTION INTRAVENOUS at 05:45

## 2021-01-01 RX ADMIN — METRONIDAZOLE 500 MG: 500 INJECTION, SOLUTION INTRAVENOUS at 00:13

## 2021-01-01 RX ADMIN — NOREPINEPHRINE BITARTRATE 10 MCG/MIN: 1 INJECTION, SOLUTION, CONCENTRATE INTRAVENOUS at 06:30

## 2021-01-01 RX ADMIN — LORAZEPAM 2 MG: 2 INJECTION INTRAMUSCULAR; INTRAVENOUS at 13:41

## 2021-01-01 RX ADMIN — SODIUM CHLORIDE 40 MG: 9 INJECTION, SOLUTION INTRAMUSCULAR; INTRAVENOUS; SUBCUTANEOUS at 10:17

## 2021-01-01 RX ADMIN — LORAZEPAM 2 MG: 2 INJECTION INTRAMUSCULAR; INTRAVENOUS at 15:52

## 2021-01-01 RX ADMIN — Medication 10 ML: at 05:36

## 2021-01-01 RX ADMIN — HEPARIN SODIUM 5000 UNITS: 5000 INJECTION INTRAVENOUS; SUBCUTANEOUS at 05:45

## 2021-01-01 RX ADMIN — MIDAZOLAM HYDROCHLORIDE 2 MG: 1 INJECTION, SOLUTION INTRAMUSCULAR; INTRAVENOUS at 02:54

## 2021-01-01 RX ADMIN — LORAZEPAM 2 MG: 2 INJECTION INTRAMUSCULAR; INTRAVENOUS at 15:30

## 2021-01-01 RX ADMIN — LORAZEPAM 2 MG: 2 INJECTION INTRAMUSCULAR; INTRAVENOUS at 13:00

## 2021-01-01 RX ADMIN — METRONIDAZOLE 500 MG: 500 INJECTION, SOLUTION INTRAVENOUS at 08:35

## 2021-01-01 RX ADMIN — Medication 30 ML: at 05:36

## 2021-01-01 RX ADMIN — CASTOR OIL AND BALSAM, PERU: 788; 87 OINTMENT TOPICAL at 11:23

## 2021-01-01 RX ADMIN — AMIODARONE HYDROCHLORIDE 0.5 MG/MIN: 50 INJECTION, SOLUTION INTRAVENOUS at 04:51

## 2021-01-01 RX ADMIN — PROPOFOL 50 MG: 10 INJECTION, EMULSION INTRAVENOUS at 21:45

## 2021-01-01 RX ADMIN — PIPERACILLIN AND TAZOBACTAM 3.38 G: 3; .375 INJECTION, POWDER, LYOPHILIZED, FOR SOLUTION INTRAVENOUS at 02:43

## 2021-01-01 RX ADMIN — PROPOFOL 20 MCG/KG/MIN: 10 INJECTION, EMULSION INTRAVENOUS at 21:54

## 2021-01-01 RX ADMIN — POTASSIUM CHLORIDE 10 MEQ: 10 INJECTION, SOLUTION INTRAVENOUS at 15:01

## 2021-01-01 RX ADMIN — Medication 20 ML: at 16:25

## 2021-01-01 RX ADMIN — LORAZEPAM 2 MG: 2 INJECTION INTRAMUSCULAR; INTRAVENOUS at 12:36

## 2021-01-01 RX ADMIN — Medication 10 ML: at 14:21

## 2021-01-01 RX ADMIN — Medication 1 AMPULE: at 08:35

## 2021-01-01 RX ADMIN — CHLORHEXIDINE GLUCONATE 15 ML: 0.12 RINSE ORAL at 10:12

## 2021-01-01 RX ADMIN — PROPOFOL 30 MCG/KG/MIN: 10 INJECTION, EMULSION INTRAVENOUS at 14:20

## 2021-01-01 RX ADMIN — VANCOMYCIN HYDROCHLORIDE 2000 MG: 10 INJECTION, POWDER, LYOPHILIZED, FOR SOLUTION INTRAVENOUS at 03:14

## 2021-01-01 RX ADMIN — Medication 30 ML: at 21:35

## 2021-01-01 RX ADMIN — CHLORHEXIDINE GLUCONATE 15 ML: 0.12 RINSE ORAL at 21:34

## 2021-01-01 RX ADMIN — HYDROMORPHONE HYDROCHLORIDE 1 MG: 1 INJECTION, SOLUTION INTRAMUSCULAR; INTRAVENOUS; SUBCUTANEOUS at 15:30

## 2021-01-01 RX ADMIN — LORAZEPAM 2 MG: 2 INJECTION INTRAMUSCULAR; INTRAVENOUS at 12:48

## 2021-01-01 RX ADMIN — FERRIC CARBOXYMALTOSE INJECTION 750 MG: 50 INJECTION, SOLUTION INTRAVENOUS at 15:40

## 2021-01-01 RX ADMIN — Medication 1 AMPULE: at 10:11

## 2021-01-01 RX ADMIN — NOREPINEPHRINE BITARTRATE 6 MCG/MIN: 1 INJECTION, SOLUTION, CONCENTRATE INTRAVENOUS at 10:20

## 2021-01-01 RX ADMIN — METRONIDAZOLE 500 MG: 500 INJECTION, SOLUTION INTRAVENOUS at 10:12

## 2021-01-01 RX ADMIN — HYDROMORPHONE HYDROCHLORIDE 1 MG: 1 INJECTION, SOLUTION INTRAMUSCULAR; INTRAVENOUS; SUBCUTANEOUS at 12:36

## 2021-01-01 RX ADMIN — PROPOFOL 30 MCG/KG/MIN: 10 INJECTION, EMULSION INTRAVENOUS at 21:11

## 2021-01-01 RX ADMIN — PROPOFOL 30 MCG/KG/MIN: 10 INJECTION, EMULSION INTRAVENOUS at 16:04

## 2021-01-01 RX ADMIN — PROPOFOL 20 MCG/KG/MIN: 10 INJECTION, EMULSION INTRAVENOUS at 09:30

## 2021-01-01 RX ADMIN — HYDROMORPHONE HYDROCHLORIDE 1 MG: 1 INJECTION, SOLUTION INTRAMUSCULAR; INTRAVENOUS; SUBCUTANEOUS at 15:52

## 2021-01-01 RX ADMIN — SODIUM CHLORIDE 40 MG: 9 INJECTION, SOLUTION INTRAMUSCULAR; INTRAVENOUS; SUBCUTANEOUS at 08:37

## 2021-01-01 RX ADMIN — HYDROMORPHONE HYDROCHLORIDE 1 MG: 1 INJECTION, SOLUTION INTRAMUSCULAR; INTRAVENOUS; SUBCUTANEOUS at 12:49

## 2021-04-13 NOTE — DISCHARGE INSTRUCTIONS
It was a pleasure taking care of you in our Emergency Department today. We know that when you come to [address] [place] [address], you are entrusting us with your health, comfort, and safety. Our physicians and nurses honor that trust, and truly appreciate the opportunity to care for you and your loved ones. We also value your feedback. If you receive a survey about your Emergency Department experience today, please fill it out. We care about our patients' feedback, and we listen to what you have to say. Thank you!

## 2021-04-13 NOTE — ED NOTES
Pt arrives to ED as a referral from his PCP for abnormal lab results. Pt reports the MD sent him due to a HGB 5.8. Pt is AOX4 and monitored X3. Pt reports a hx of pacemaker placement, afib, and open heart bypass surgery.

## 2021-04-13 NOTE — Clinical Note
[address] 
[facility] EMERGENCY DEPT 
[address] [facility] [place] [address] 
[phone] Work/School Note Date: 4/13/2021 To Whom It May concern: 
 
Wyatt Rader was seen and treated today in the emergency room by the following provider(s): 
Attending Provider: Karyn Velarde MD.   
 
Wyatt Rader is excused from work/school on 4/13/2021 through 4/16/2021. He is medically clear to return to work/school on 4/17/2021. Sincerely, Brenda Johnson MD

## 2021-04-13 NOTE — ED PROVIDER NOTES
EMERGENCY DEPARTMENT HISTORY AND PHYSICAL EXAM 
 
 
Date: 4/13/2021 Patient Name: Almaz Herron History of Presenting Illness Chief Complaint Patient presents with  Referral Request  
  sent for low Hemoglobnin and low iron.  Abnormal Lab Results History Provided By: Patient HPI: Almaz Herron, 76 y.o. male with a past medical history significant for CAD status post cardiac bypass surgery, COPD, hyperlipidemia, hypertension, PAD presents to the ED with cc of evaluation for severe lab normalities. Patient had a primary care follow-up yesterday for some generalized mild fatigue. Blood work was sent and he was found to be severely anemic today with a hemoglobin level of 5.8. Patient denies any bloody or black stools. He denies any known excessive bleeding. He does take Xarelto as an outpatient. He reports that he was told his \"iron was low\" and he was recently started on supplementation. He was sent to the ER for blood transfusion. He denies any chest pain, trouble breathing, leg swelling, syncope, near-syncope, or any other associated symptoms. No other exacerbating ameliorating factors. There are no other complaints, changes, or physical findings at this time. PCP: Sandhya Eller MD 
 
No current facility-administered medications on file prior to encounter. Current Outpatient Medications on File Prior to Encounter Medication Sig Dispense Refill  rivaroxaban (XARELTO) 20 mg tab tablet Take 1 Tab by mouth daily (with dinner). 30 Tab 0  
 atorvastatin (LIPITOR) 40 mg tablet Take 1 Tab by mouth nightly. 30 Tab 0  
 spironolactone (ALDACTONE) 25 mg tablet Take 1 Tab by mouth daily. 30 Tab 0  carvedilol (COREG) 25 mg tablet Take 1 Tab by mouth two (2) times daily (with meals). (Patient taking differently: Take 25 mg by mouth daily.) 60 Tab 0  
 pantoprazole (PROTONIX) 40 mg tablet Take 40 mg by mouth daily. Past History Past Medical History: 
Past Medical History:  
Diagnosis Date  Aortal valvular stenosis  CAD (coronary artery disease)  COPD  Hypercholesterolemia  Hypertension  PAD (peripheral artery disease) ([facility] [address] Past Surgical History: 
Past Surgical History:  
Procedure Laterality Date  CABG, ARTERY-VEIN, FOUR    
 HX AORTIC VALVE REPLACEMENT  10/3/07  
 mosaic ultra porcine medtronic [id number]  HI INS NEW/RPLCMT PRM PM W/TRANSV ELTRD ATRIAL&VENT N/A 10/16/2020 INSERT PPM DUAL performed by Haritha Campa MD at [address] [facility], [facility] CATH LAB Family History: No family history on file. Social History: 
Social History Tobacco Use  Smoking status: Former Smoker Quit date: 2007 Years since quittin.5  Smokeless tobacco: Never Used Substance Use Topics  Alcohol use: No  
 Drug use: No  
 
 
Allergies: 
No Known Allergies Review of Systems Review of Systems Constitutional: Negative for chills, diaphoresis, fatigue and fever. HENT: Negative for ear pain and sore throat. Eyes: Negative for pain and redness. Respiratory: Negative for cough and shortness of breath. Cardiovascular: Negative for chest pain and leg swelling. Gastrointestinal: Negative for abdominal pain, diarrhea, nausea and vomiting. Endocrine: Negative for cold intolerance and heat intolerance. Genitourinary: Negative for flank pain and hematuria. Musculoskeletal: Negative for back pain and neck stiffness. Skin: Negative for rash and wound. Neurological: Negative for dizziness, syncope and headaches. All other systems reviewed and are negative. Physical Exam  
Physical Exam 
Vitals signs and nursing note reviewed. Constitutional:   
   Appearance: He is well-developed. HENT:  
   Head: Normocephalic and atraumatic. Mouth/Throat:  
   Pharynx: No oropharyngeal exudate. Eyes:  
   Conjunctiva/sclera: Conjunctivae normal.  
   Pupils: Pupils are equal, round, and reactive to light. Neck: Musculoskeletal: Normal range of motion. Cardiovascular:  
   Rate and Rhythm: Normal rate and regular rhythm. Heart sounds: No murmur. Pulmonary:  
   Effort: Pulmonary effort is normal. No respiratory distress. Breath sounds: Normal breath sounds. No wheezing. Abdominal:  
   General: Bowel sounds are normal. There is no distension. Palpations: Abdomen is soft. Tenderness: There is no abdominal tenderness. Musculoskeletal: Normal range of motion. General: No deformity. Skin: 
   General: Skin is warm and dry. Findings: No rash. Neurological:  
   Mental Status: He is alert and oriented to person, place, and time. Coordination: Coordination normal.  
Psychiatric:     
   Behavior: Behavior normal.  
 
 
 
Diagnostic Study Results Labs - Recent Results (from the past 24 hour(s)) CBC WITH AUTOMATED DIFF Collection Time: 04/13/21 11:44 AM  
Result Value Ref Range WBC 8.5 4.1 - 11.1 K/uL  
 RBC 3.38 (L) 4.10 - 5.70 M/uL HGB 5.5 (LL) 12.1 - 17.0 g/dL HCT 20.5 (L) 36.6 - 50.3 % MCV 60.7 (L) 80.0 - 99.0 FL  
 MCH 16.3 (L) 26.0 - 34.0 PG  
 MCHC 26.8 (L) 30.0 - 36.5 g/dL  
 RDW 17.5 (H) 11.5 - 14.5 % PLATELET 438 198 - 108 K/uL NRBC 0.0 0  WBC ABSOLUTE NRBC 0.00 0.00 - 0.01 K/uL NEUTROPHILS 66 32 - 75 % LYMPHOCYTES 9 (L) 12 - 49 % MONOCYTES 9 5 - 13 % EOSINOPHILS 14 (H) 0 - 7 % BASOPHILS 1 0 - 1 % IMMATURE GRANULOCYTES 1 (H) 0.0 - 0.5 % ABS. NEUTROPHILS 5.5 1.8 - 8.0 K/UL  
 ABS. LYMPHOCYTES 0.8 0.8 - 3.5 K/UL  
 ABS. MONOCYTES 0.8 0.0 - 1.0 K/UL  
 ABS. EOSINOPHILS 1.2 (H) 0.0 - 0.4 K/UL  
 ABS. BASOPHILS 0.1 0.0 - 0.1 K/UL  
 ABS. IMM. GRANS. 0.1 (H) 0.00 - 0.04 K/UL  
 DF SMEAR SCANNED    
 RBC COMMENTS ANISOCYTOSIS 1+ 
    
 RBC COMMENTS OVALOCYTES 1+ RBC COMMENTS MICROCYTOSIS 1+ 
    
 RBC COMMENTS HYPOCHROMIA 2+ 
    
 RBC COMMENTS SCHISTOCYTES 1+ METABOLIC PANEL, COMPREHENSIVE Collection Time: 04/13/21 11:44 AM  
Result Value Ref Range Sodium 138 136 - 145 mmol/L Potassium 2.9 (L) 3.5 - 5.1 mmol/L Chloride 102 97 - 108 mmol/L  
 CO2 31 21 - 32 mmol/L Anion gap 5 5 - 15 mmol/L Glucose 102 (H) 65 - 100 mg/dL BUN 13 6 - 20 MG/DL Creatinine 1.13 0.70 - 1.30 MG/DL  
 BUN/Creatinine ratio 12 12 - 20 GFR est AA >60 >60 ml/min/1.73m2 GFR est non-AA >60 >60 ml/min/1.73m2 Calcium 7.9 (L) 8.5 - 10.1 MG/DL Bilirubin, total 1.4 (H) 0.2 - 1.0 MG/DL  
 ALT (SGPT) 25 12 - 78 U/L  
 AST (SGOT) 18 15 - 37 U/L Alk. phosphatase 70 45 - 117 U/L Protein, total 6.7 6.4 - 8.2 g/dL Albumin 3.4 (L) 3.5 - 5.0 g/dL Globulin 3.3 2.0 - 4.0 g/dL A-G Ratio 1.0 (L) 1.1 - 2.2 TYPE & SCREEN Collection Time: 04/13/21 11:44 AM  
Result Value Ref Range Crossmatch Expiration 04/16/2021,2359 ABO/Rh(D) O POSITIVE Antibody screen NEG Unit number [id number] Blood component type RC LR Unit division 00 Status of unit ISSUED Crossmatch result Compatible IRON PROFILE Collection Time: 04/13/21 11:44 AM  
Result Value Ref Range Iron 9 (L) 35 - 150 ug/dL TIBC 382 250 - 450 ug/dL Iron % saturation 2 (L) 20 - 50 % PATHOLOGIST REVIEW Collection Time: 04/13/21 11:44 AM  
Result Value Ref Range Pathologist review (NOTE) OCCULT BLOOD, STOOL Collection Time: 04/13/21 12:52 PM  
Result Value Ref Range Occult blood, stool Negative NEG    
RBC, ALLOCATE Collection Time: 04/13/21  1:00 PM  
Result Value Ref Range HISTORY CHECKED? Historical check performed Radiologic Studies - No orders to display CT Results  (Last 48 hours) None CXR Results  (Last 48 hours) None Medical Decision Making I am the first provider for this patient. I reviewed the vital signs, available nursing notes, past medical history, past surgical history, family history and social history.  
 
Vital Signs-Reviewed the patient's vital signs. Patient Vitals for the past 12 hrs: 
 Temp Pulse Resp BP SpO2  
04/13/21 1741 98.5 °F (36.9 °C) 68 18 137/61 100 % 04/13/21 1726 98.6 °F (37 °C) 72 18 (!) 121/55 100 % 04/13/21 1711 98.5 °F (36.9 °C) 75 17 (!) 142/66 97 % 04/13/21 1656 98.6 °F (37 °C) 61 18 127/65 97 % 04/13/21 1641 98.5 °F (36.9 °C) 66 19 (!) 108/49 96 % 04/13/21 1624 98.4 °F (36.9 °C) 77 15 100/70 96 % 04/13/21 1622 98.4 °F (36.9 °C) 73 15 100/70 95 % 04/13/21 1330  65 19 (!) 113/52 100 % 04/13/21 1300  70 23 125/74 (!) 76 % 04/13/21 1228 98 °F (36.7 °C) 62 15 (!) 110/54 100 % 04/13/21 1059 97.5 °F (36.4 °C) 70 24 (!) 111/51 99 % Records Reviewed: Nursing records and medical records reviewed MDM: 
Patient presenting with generalized fatigue/weakness. Stable vitals and nontoxic appearing. DDx: infection, anemia, electrolyte anomoly (hypo or hyperkalemia, hypomagnesemia), hypothyroid, dehydration, depression, CA, ACS. Will obtain EKG, UA, labwork for any urgent/emergent pathology. Will reassess and monitor while in ED. Provider Notes (Medical Decision Making):  
Patient is a 57-year-old male found to be severely anemic with hemoglobin of 5.8. He has brown Hemoccult negative stools. He has no evidence of internal or external bleeding. He has a severely low iron level with microcytic anemia. I strongly suggested admission to the hospital, however, patient reports hating hospitals and would prefer outpatient management. I have ordered a blood transfusion of 1 unit and he is tolerated well. I have first given him potassium supplementation in the ER. This has been prescribed as well. Have also given him hematology referral and follow-up for outpatient work-up of his anemia. He suspect iron deficiency. However, he may need further diagnostic testing to confirm this. ED Course:  
Initial assessment performed.  The patients presenting problems have been discussed, and they are in agreement with the care plan formulated and outlined with them. I have encouraged them to ask questions as they arise throughout their visit. Medications Administered   
 potassium chloride 10 mEq in 100 ml IVPB Admin Date 
04/13/2021 Action New Bag Dose 
10 mEq Rate 
100 mL/hr Route IntraVENous Administered By Mic Tate Critical Care: 
None Disposition: 
6:24 PM 
Kathryn Mcdermott  results have been reviewed with him. He has been counseled regarding his diagnosis. He verbally conveys understanding and agreement of the signs, symptoms, diagnosis, treatment and prognosis and additionally agrees to follow up as recommended with Dr. Flaca Lockwood MD in 24 - 48 hours. He also agrees with the care-plan and conveys that all of his questions have been answered. I have also put together some discharge instructions for him that include: 1) educational information regarding their diagnosis, 2) how to care for their diagnosis at home, as well a 3) list of reasons why they would want to return to the ED prior to their follow-up appointment, should their condition change. DISCHARGE PLAN: 
1. Current Discharge Medication List  
  
START taking these medications Details  
potassium chloride SR (KLOR-CON 10) 10 mEq tablet Take 1 Tab by mouth two (2) times a day. Qty: 30 Tab, Refills: 0  
  
  
 
2. Follow-up Information Follow up With Specialties Details Why Contact Info Flaca Lockwood MD Family Medicine In 3 days For a follow-up evaluation. [address] [place] [address] 
[phone] Viral Crooks MD Hematology and Oncology, Internal Medicine, Hematology, Oncology In 3 days For a follow-up evaluation. THIS IS A SPECIALIST YOU CAN SEE FOR FURTHER EVALUAITON OF YOUR ANEMIA. [facility] 
[phone]  [facility] EMERGENCY DEPT Emergency Medicine In 1 day If symptoms worsen [phone] 
[phone] 3. Return to ED if worse Diagnosis Clinical Impression: 1. Severe anemia 2. Hypokalemia Attestations: 
 
Matthew Weber MD 
 
Please note that this dictation was completed with [employer], the computer voice recognition software. Quite often unanticipated grammatical, syntax, homophones, and other interpretive errors are inadvertently transcribed by the computer software. Please disregard these errors. Please excuse any errors that have escaped final proofreading. Thank you.

## 2021-04-14 PROBLEM — D50.9 IRON DEFICIENCY ANEMIA: Status: ACTIVE | Noted: 2021-01-01

## 2021-04-14 NOTE — LETTER
5/2/2021 Patient: Peggy Thorpe YOB: 1945 Date of Visit: 4/14/2021 Umu Lantigua MD 
[address] [place] [address] [address] Via Fax: [phone] Dear Umu Lantigua MD, Thank you for referring Mr. Peggy Thorpe to [address] [place] [address] for evaluation. My notes for this consultation are attached. If you have questions, please do not hesitate to call me. I look forward to following your patient along with you.  
 
 
Sincerely, 
 
Lynnwood Najjar, MD

## 2021-04-14 NOTE — PROGRESS NOTES
Austin Whiteside is a 76 y.o. male here for new patient appt for low Hgb. Pt went to PCP on 4/12 for severe fatigue and dizziness and labs were drawn. He was sent to ED yesterday for blood transfusion. Hgb was 5.5 in ED. He has never has blood transfusion before. He had open heart surgery 13 years ago. Replace valve and had 5 bypasses done. He does have a pacemaker which was put in October, 2020. He feels better since his blood transfusion. 1. Have you been to the ER, urgent care clinic since your last visit? Hospitalized since your last visit? New Pt 
 
2. Have you seen or consulted any other health care providers outside of the [address] [place] [address] since your last visit? Include any pap smears or colon screening. New Pt He does not want the COVID vaccine.

## 2021-04-15 NOTE — PROGRESS NOTES
[facility] [place] 
at [place] [address], [facility], [address] [place] [phone] Hematology Note Patient: Manuela Eisenmenger MRN: [id number]  SSN: [id number] YOB: 1945  Age: 76 y.o. Sex: male Subjective:  
  
Manuela Eisenmenger is a 76 y.o. male who who I am seeing in iron deficiency anemia. He has suffered with iron deficiency anemia for over a yr. He feels fatigued. Denies active rectal bleeding. Routine labs show anemia and iron deficiency. He was sent to the ED and was transfused RBC. He had a colonoscopy over 30 years ago and is adamant that he does not want another one regardless of reason for iron deficiency. Review of Systems: 
 
Constitutional: negative Eyes: negative Ears, Nose, Mouth, Throat, and Face: negative Respiratory: negative Cardiovascular: negative Gastrointestinal: negative Genitourinary:negative Integument/Breast: negative Hematologic/Lymphatic: negative Musculoskeletal:negative Neurological: negative Past Medical History:  
Diagnosis Date  Anemia  Aortal valvular stenosis  CAD (coronary artery disease)  COPD  Hypercholesterolemia  Hypertension  PAD (peripheral artery disease) ([facility] [address] Past Surgical History:  
Procedure Laterality Date  HX AORTIC VALVE REPLACEMENT  10/3/07  
 mosaic ultra porcine medtronic [id number]  VT CABG, ARTERY-VEIN, FOUR    
 VT INS NEW/RPLCMT PRM PM W/TRANSV ELTRD ATRIAL&VENT N/A 10/16/2020 INSERT PPM DUAL performed by Felix Medellin MD at [address], [facility] CATH LAB History reviewed. No pertinent family history. Social History Tobacco Use  Smoking status: Former Smoker Quit date: 2007 Years since quittin.5  Smokeless tobacco: Never Used Substance Use Topics  Alcohol use: No  
  
Prior to Admission medications Medication Sig Start Date End Date Taking?  Authorizing Provider  
furosemide (LASIX) 40 mg tablet Take  by mouth daily. Yes Provider, Historical  
losartan (COZAAR) 25 mg tablet Take  by mouth daily. Yes Provider, Historical  
magnesium 250 mg tab Take  by mouth. Yes Provider, Historical  
multivitamin (ONE A DAY) tablet Take 1 Tab by mouth daily. Yes Provider, Historical  
ibuprofen (MOTRIN) 800 mg tablet Take  by mouth. Yes Provider, Historical  
potassium chloride SR (KLOR-CON 10) 10 mEq tablet Take 1 Tab by mouth two (2) times a day. 4/13/21  Yes Prasanna Lobo MD  
rivaroxaban (XARELTO) 20 mg tab tablet Take 1 Tab by mouth daily (with dinner). 10/11/20  Yes Diamond Dumont MD  
atorvastatin (LIPITOR) 40 mg tablet Take 1 Tab by mouth nightly. 10/11/20  Yes Diamond Dumont MD  
carvedilol (COREG) 25 mg tablet Take 1 Tab by mouth two (2) times daily (with meals). Patient taking differently: Take 25 mg by mouth daily. 8/21/18  Yes Sosa James MD  
pantoprazole (PROTONIX) 40 mg tablet Take 40 mg by mouth daily. Yes Abhi Sauceda MD  
spironolactone (ALDACTONE) 25 mg tablet Take 1 Tab by mouth daily. 10/12/20   Diamond Dumont MD  
  
 
 
 
No Known Allergies Objective:  
 
Vitals:  
 04/14/21 1310 BP: 118/65 Pulse: 64 Temp: 99.6 °F (37.6 °C) TempSrc: Temporal  
SpO2: 97% Weight: 207 lb (93.9 kg) Height: 5' 8\" (1.727 m) Physical Exam: 
 
GENERAL: alert, cooperative, no distress, appears stated age EYE: conjunctivae/corneas clear. PERRL, EOM's intact LYMPHATIC: Cervical, supraclavicular, and axillary nodes normal.  
THROAT & NECK: normal and no erythema or exudates noted. LUNG: clear to auscultation bilaterally HEART: regular rate and rhythm, S1, S2 normal, no murmur, click, rub or gallop ABDOMEN: soft, non-tender. Bowel sounds normal. No masses,  no organomegaly EXTREMITIES:  extremities normal, atraumatic, no cyanosis or edema SKIN: Normal. 
NEUROLOGIC: AOx3.  Gait normal. Reflexes and motor strength normal and symmetric. Cranial nerves 2-12 and sensation grossly intact. Lab Results Component Value Date/Time WBC 8.5 04/13/2021 11:44 AM  
 HGB 5.5 (LL) 04/13/2021 11:44 AM  
 HCT 20.5 (L) 04/13/2021 11:44 AM  
 PLATELET 270 68/26/1152 11:44 AM  
 MCV 60.7 (L) 04/13/2021 11:44 AM  
 
 
 
 
Lab Results Component Value Date/Time Iron 9 (L) 04/13/2021 11:44 AM  
 TIBC 382 04/13/2021 11:44 AM  
 Iron % saturation 2 (L) 04/13/2021 11:44 AM  
 
 
 
 
Assessment: 1. Iron deficiency anemia secondary to chronic gastrointestinal bleeding: 
 
Refuses endoscopies. I discussed with her various ways to replace/supplement iron which includes giving oral iron preparation such as iron sulfate or similar products or utilizing intravenous access to administer the total dose of iron. The patient was given the option to choose from various oral and intravenous iron preparation and after a prolonged discussion we have agreed to proceed with IV Iron to be administered in [facility]. I counseled the patient regarding the side effects of IV Iron infusion which includes rare instances of anaphylactic reactions etc.  After weighing the benefit and risks, the patient agreed to proceed with the treatment. Plan: 1. IV Iron in [facility] 2. Labs in 3 and 6 months 3. Return in 6 monhs Signed by: Ragini Otoole MD 
                   April 15, 2021

## 2021-04-22 NOTE — PROGRESS NOTES
Outpatient Infusion Center Short Visit Progress Note 1500 Patient admitted to [address] [place] [address] for [facility] CENTER 1/2 ambulatory in stable condition. Assessment completed. No new concerns voiced. Covid Screening 1. Do you have any symptoms of COVID-19? SOB, coughing, fever, or generally not feeling well ? NO 
2. Have you been exposed to COVID-19 recently? NO 
3. Have you had any recent contact with family/friend that has a pending COVID test? NO Vital Signs: 
Patient Vitals for the past 12 hrs: 
 Temp Pulse Resp BP SpO2  
04/22/21 1615  60 16 107/63   
04/22/21 1458 98.3 °F (36.8 °C) 86 16 117/67 100 % Peripheral IV 04/22/21 Left Hand (Active) Site Assessment Clean, dry, & intact 04/22/21 1500 Phlebitis Assessment 0 04/22/21 1500 Infiltration Assessment 0 04/22/21 1500 Dressing Status New 04/22/21 1500 Dressing Type Transparent 04/22/21 1500 Hub Color/Line Status Yellow; Flushed; Infusing 04/22/21 1500 Action Taken Open ports on tubing capped 04/22/21 1500 Alcohol Cap Used Yes 04/22/21 1500 Medications: 
Medications Administered   
 ferric carboxymaltose (INJECTAFER) 750 mg in 0.9% sodium chloride 250 mL IVPB Admin Date 
04/22/2021 Action Given Dose 
750 mg Rate 
750 mL/hr Route IntraVENous Administered By 
Stacia Shahid Patient's PIV flushed and removed, bandage placed over site. Patient tolerated treatment well. Patient discharged from [place] [id number] ambulatory in no distress at 1615. Patient aware of next appointment. Future Appointments Date Time Provider Malachi Álvarez 4/22/2021  3:00 PM CHAIR 3 [facility] [address] [place] [address] REG  
4/29/2021  3:00 PM [place] INFUSION NURSE 4 [facility] REG

## 2021-04-22 NOTE — PROGRESS NOTES
[address] [place] [address] DISCHARGE INSTRUCTIONS FOR: 
 
IRON INFUSIONS - INCLUDING VENOFER, FERRLECIT, AND INFED You should continue to take your usual home medications unless otherwise instructed by your physician. Drink plenty of fluids and eat your usual diet. All medications have the potential to cause side effects. Your physician can instruct you regarding any necessary treatment for side effects. Some possible side effects of iron infusions may include the following: - Urinary changes; - Mild muscle cramping; 
 - Mild nausea, stomach pain, diarrhea or constipation; - Mild skin itching, mild pain at IV site. Signs/Symptoms of an allergic reaction may require immediate medical attention. These may include one or more of the following:  
 
 
Skin redness, itching, swelling, blistering, weeping, crusting, rash or hives. Wheezing, chest tightness, cough, or shortness of breath;  
Swelling of the face, eyelids, lips, tongue, or throat; 
Severe headache, seizures or tremor; 
Stuffy nose, runny nose, sneezing;  
Red (bloodshot), itchy, swollen, or watery eyes; 
Stomach  pain, nausea, vomiting, diarrhea or bloody diarrhea; Chest pain or tightness, increased heart rate, palpitations, changes in blood pressure which can cause dizziness, unusual feelings of weakness or fatigue; 
Back ache or pain around waist; 
Painful urination, increase or decrease in amount of urine, blood in urine. Contact your physicians office with any questions or concerns regarding your treatment. Omer Smith, Signature: _____________________________________ 4/22/2021 Sharon Campa

## 2021-04-29 NOTE — PROGRESS NOTES
Outpatient Infusion Center Progress Note    1500 - Pt admit to [facility] for Injectafer dose 2/2 in stable condition. Assessment completed. Patient denied having any symptoms of COVID-19, i.e. SOB, coughing, fever, or generally not feeling well. Also denies having been exposed to COVID-19 recently or having had any recent contact with family/friend that has a pending COVID test.     #24 PIV established w/o issue R AC with positive blood return. Line flushed, clamped, Curos Cap applied to end clave. Patient Vitals for the past 12 hrs:   Temp Pulse Resp BP SpO2   04/29/21 1625  (!) 58 18 (!) 88/53    04/29/21 1502 97.9 °F (36.6 °C) 77 18 (!) 96/55 98 %       Medications Administered     ferric carboxymaltose (INJECTAFER) 750 mg in 0.9% sodium chloride 250 mL, overfill volume 25 mL IVPB     Admin Date  04/29/2021 Action  Given Dose  750 mg Rate  870 mL/hr Route  IntraVENous Administered By  Rollo Sizer          sodium chloride (NS) flush 5-10 mL     Admin Date  04/29/2021 Action  Given Dose  20 mL Route  IntraVENous Administered By  Rollo Sizer                 Pt tolerated treatment well. IV flushed and DCd. Pressure held until no bleeding observed and site dressed with 2x2 and wrapped by [place]. 1635 - D/c home in no distress after 30min obs period. Pt aware of next [facility] appointment scheduled for: No further treatment scheduled at time time; pt completed scheduled infusions.        Future Appointments   Date Time Provider Malachi Álvarez   4/29/2021  3:00 PM [place] INFUSION NURSE 4 [facility] REG

## 2021-08-02 PROBLEM — I46.9 CARDIAC ARREST (HCC): Status: ACTIVE | Noted: 2021-01-01

## 2021-08-03 NOTE — H&P
[facility]    ICU TEAM Progress Note    Name: Gabrielle Walker   : 1945   MRN: [id number]   Date: 8/3/2021      Subjective:   Progress Note: 8/3/2021      75 y/o M with PMH of aortic stenosis s/p porcine AVR in 2007, CAD, CAD s/p CABG x4, COPD, HFrEF 15-20%, pacemaker placement 10/2020 presents to [facility] after OSH V-tach arrest. ROSC achieved by EMS after shock x7, 450mg of amio, and Epi. Intubated by EMS with a 6.5 ETT. As per wife, pt has lost 70 pounds over the past few months. In the past 2 weeks he has developed a cough with sputum. He has not been vaccinated for COVID. Pt will be admitted to the ICU for post cardiac arrest care.       Active Problem List:     Problem List  Date Reviewed: 10/15/2020        Codes Class    Cardiac arrest [facility]) ICD-10-CM: I46.9  ICD-9-CM: 427.5         Iron deficiency anemia ICD-10-CM: D50.9  ICD-9-CM: 280.9         Bradycardia ICD-10-CM: R00.1  ICD-9-CM: 427.89         CHF (congestive heart failure), NYHA class IV ([facility] [address] ICD-10-CM: I50.9  ICD-9-CM: 428.0         Rapid atrial fibrillation (HCC) ICD-10-CM: I48.91  ICD-9-CM: 427.31         Atrial fibrillation with RVR (HCC) ICD-10-CM: I48.91  ICD-9-CM: 427.31         PAD (peripheral artery disease) (HCC) ICD-10-CM: I73.9  ICD-9-CM: 443.9         Coronary artery disease due to lipid rich plaque ICD-10-CM: I25.10, I25.83  ICD-9-CM: 414.00, 414.3         S/P CABG (coronary artery bypass graft) ICD-10-CM: Z95.1  ICD-9-CM: V45.81         S/P AVR (aortic valve replacement) ICD-10-CM: Z95.2  ICD-9-CM: V43.3         Encounter for long-term (current) use of other medications ICD-10-CM: Z79.899  ICD-9-CM: V58.69         Pure hypercholesterolemia ICD-10-CM: E78.00  ICD-9-CM: 272.0         Essential hypertension, benign ICD-10-CM: I10  ICD-9-CM: 401.1         Coronary atherosclerosis of unspecified type of vessel, native or graft ICD-10-CM: I25.10  ICD-9-CM: 414.00               Past Medical History:      has a past medical history of Anemia, Aortal valvular stenosis, CAD (coronary artery disease), COPD, Hypercholesterolemia, Hypertension, and PAD (peripheral artery disease) ([facility] [address] Past Surgical History:      has a past surgical history that includes pr cabg, artery-vein, four; hx aortic valve replacement (10/3/07); and pr ins new/rplcmt prm pm w/transv eltrd atrial&vent (N/A, 10/16/2020). Home Medications:     Prior to Admission medications    Medication Sig Start Date End Date Taking? Authorizing Provider   furosemide (LASIX) 40 mg tablet Take  by mouth daily. Provider, Historical   losartan (COZAAR) 25 mg tablet Take  by mouth daily. Provider, Historical   magnesium 250 mg tab Take  by mouth. Provider, Historical   multivitamin (ONE A DAY) tablet Take 1 Tab by mouth daily. Provider, Historical   ibuprofen (MOTRIN) 800 mg tablet Take  by mouth. Provider, Historical   potassium chloride SR (KLOR-CON 10) 10 mEq tablet Take 1 Tab by mouth two (2) times a day. 21   Rosalia Reeves MD   rivaroxaban (XARELTO) 20 mg tab tablet Take 1 Tab by mouth daily (with dinner). 10/11/20   Alicia Spain MD   atorvastatin (LIPITOR) 40 mg tablet Take 1 Tab by mouth nightly. 10/11/20   Alicia Spain MD   spironolactone (ALDACTONE) 25 mg tablet Take 1 Tab by mouth daily. 10/12/20   Alicia Spain MD   carvedilol (COREG) 25 mg tablet Take 1 Tab by mouth two (2) times daily (with meals). Patient taking differently: Take 25 mg by mouth daily. 18   Heena Munoz MD   pantoprazole (PROTONIX) 40 mg tablet Take 40 mg by mouth daily. Other, MD Abhi       Allergies/Social/Family History:     No Known Allergies   Social History     Tobacco Use    Smoking status: Former Smoker     Quit date: 2007     Years since quittin.8    Smokeless tobacco: Never Used   Substance Use Topics    Alcohol use: No      No family history on file.     Review of Systems:     Pertinent items are noted in HPI. Objective:   Vital Signs:  Visit Vitals  /64   Pulse 68   Temp (!) 96.6 °F (35.9 °C)   Resp 21   Wt 90.2 kg (198 lb 13.7 oz)   SpO2 100%   BMI 30.24 kg/m²      O2 Device: Ventilator Temp (24hrs), Av.6 °F (35.9 °C), Min:96.6 °F (35.9 °C), Max:96.6 °F (35.9 °C)           Intake/Output:     Intake/Output Summary (Last 24 hours) at 8/3/2021 0123  Last data filed at 2021 2302  Gross per 24 hour   Intake 1000 ml   Output    Net 1000 ml       Physical Exam:    General:  sedated  Eye:  conjunctivae/corneas clear. PERRL,   Neurologic:  Non purposeful movement  Lymphatic:  Cervical, supraclavicular, and axillary nodes normal.   Neck:  normal and no erythema or exudates noted. Lungs:  clear to auscultation bilaterally  Heart:  regular rate and rhythm, S1, S2 normal, no murmur, click, rub or gallop  Abdomen:  soft, non-tender. Bowel sounds normal. No masses,  no organomegaly  Cardiovascular:  Regular rate and rhythm, S1S2 present, without murmur or extra heart sounds, pedal pulses normal and no edema  Skin:  Normal.    LABS AND  DATA: Personally reviewed  Recent Labs     21   WBC 42.8*   HGB 10.8*   HCT 34.8*        Recent Labs     21      K 2.7*   CL 97   CO2 21   BUN 27*   CREA 1.77*   *   CA 8.4*   MG 1.8   PHOS 8.8*     Recent Labs     21   AP 92   TP 5.7*   ALB 1.8*   GLOB 3.9     No results for input(s): INR, PTP, APTT, INREXT in the last 72 hours. No results for input(s): PHI, PCO2I, PO2I, FIO2I in the last 72 hours.   Recent Labs     21   TROIQ 0.43*       Hemodynamics:   PAP:   CO:     Wedge:   CI:     CVP:    SVR:       PVR:       Ventilator Settings:  Mode Rate Tidal Volume Pressure FiO2 PEEP   Assist control (mode changed to A/C)   500 ml  10 cm H2O 50 % (FIO2 lowered to 50%) 5 cm H20     Peak airway pressure: 30 cm H2O    Minute ventilation: 13.1 l/min        MEDS: Reviewed    Chest X-Ray: personally reviewed and report checked    Echo 10/10/20  · LV: Estimated LVEF is 15 - 20%. Normal wall thickness. Moderately dilated left ventricle. Severely reduced systolic function. · LA: Mildly dilated left atrium. · RV: Moderately reduced systolic function. · AV: Prosthetic aortic valve. Aortic valve leaflet calcification present with reduced excursion. Moderate aortic valve stenosis is present. Mild aortic valve regurgitation is present. · MV: Moderate mitral annular calcification. Moderate to severe mitral valve regurgitation is present. · TV: Mild to moderate tricuspid valve regurgitation is present. · PV: Mild pulmonic valve regurgitation is present. · LA: No atrial septal defect present. Assessment and Plan:     Discussed Plan of Care (goals of care):  Yes  Addressed Code Status: Full Code  NOK: Griselda Farrier (spouse)    Acute hypoxic respiratory failure, ventilator dependent  Vent Goals:   Chlorhexidine   Optimize PEEP/Ventilation/Oxygenation  Goal Tidal Volume 6 cc/kg based on IBW  Aim for lung protective ventilation  Head of bed > 30 degrees  Aggressive bronchopulmonary hygiene  DVT Prophylaxis (if no, list reason): Heparin   SPO2 Goal: > 92%  -rapid covid negative    AMS 2/2 cardiac arrest  -no purposeful movement s/p cardiac arrest  -Intravascular cooling central line placed and hypothermia protocol initiated  -propofol gtt and fentanyl gtt for Goal Rass  -3 while cooling  -CT head negative for acute intracranial hemorrhage, mass or infarct  -may require MRI in a few days if neuro status does not improve  -neurology consult in the am    V-Tach with cardiac arrest 2/2 to hypokalemia and HFrEF +/- severe sepsis  -cont amioderone gtt  -Goal K>4 and Mg>2  -BMP Q6   -hold off on IVF as pt already has s/o fluid overload  -TTE in am  -cardiology consult to interrogate pacemaker  -CTA chest negative for PE  -CT abd showing segmental colitis  -f/u c-diff culture given pt is having liquid diarrhea  -lacate 14-->3-->2.8  -f/u blood culture and UA  -broad spectrum antibiotics for sever sepsis of unknown etiology    SUSHIL 2/2 cardiac arrest  -strict I/Os    ANTIBIOTICS  Antibiotics:  Vancomycin  Zosyn    Velazquez Catheter Present: Yes  GI Prophylaxis: Protonix (pantoprazole)   Nutrition: No   Bowel Movement: Yes    T/L/D  Tubes: ETT  Lines: Peripheral IV and Central Line  Drains: Velazquez Catheter      DISPOSITION  Stay in ICU    CRITICAL CARE CONSULTANT NOTE  I had a face to face encounter with the patient, reviewed and interpreted patient data including clinical events, labs, images, vital signs, I/O's, and examined patient. I have discussed the case and the plan and management of the patient's care with the consulting services, the bedside nurses and the respiratory therapist.      NOTE OF PERSONAL INVOLVEMENT IN CARE   This patient has a high probability of imminent, clinically significant deterioration, which requires the highest level of preparedness to intervene urgently. I participated in the decision-making and personally managed or directed the management of the following life and organ supporting interventions that required my frequent assessment to treat or prevent imminent deterioration. I personally spent 80 minutes of critical care time. This is time spent at this critically ill patient's bedside actively involved in patient care as well as the coordination of care and discussions with the patient's family. This does not include any procedural time which has been billed separately.     Jean Carlos Nava NP  [facility] Critical Care  8/3/2021

## 2021-08-03 NOTE — PROGRESS NOTES
0700: recvd report and assumed care of pt. Sedation off since 3am.    0800: EEG at bedside, Dr Odalis Walls at bedside to assess pt. Physical assessment complete as documented. Very minimal w/d reflex, +cough, +gag, +corneal, +PERRLA, does not follow commands or spontaneously move extremities. 0900: wife and daughter at bedside. Updated on pt condition, including neurological status and the likelihood the patient has a severe brain injury based on my assessment and down time during cardiac arrest. The patient's wife stated that she wants pt to be a DNR as she knows he would not survive another cardiac arrest. She also stated that the patient would be very upset with her right now because of resuscitation, intubation, hypothermia, etc. She tells me that the patient told her before that he would never want all of the things that are being done to him currently. We discussed hypothermia protocol and I explained that now that we began this process, it would be best to finish the hypothermia process before making decisions. They are calling in family to see patient and tentatively planning on W/D care tomorrow. 0945: due to continued shivering causing high peak pressures on vent even after warm blankets, restarted propofol gtt and made Dr SANTIAGO aware. 1200: reassessment unchanged as noted    1500: Pt daughter asked about pt upper denture/partial plate. It is not in patient room. I called and spoke with ER charge who has not seen or heard anything about it either. I looked through the EMS record and it appears as though pt was intubated at the patient's home. I asked the daughter to check at his home as they would've been removed for intubation. She will check with her mom to see if they are at the house. 1530: After inspecting pt mouth, dentures were inside. Removed them and gave to daughter.

## 2021-08-03 NOTE — ED PROVIDER NOTES
EMERGENCY DEPARTMENT HISTORY AND PHYSICAL EXAM     ------------------------------------------------------------------------------------------------------  Please note that this dictation was completed with [employer], the [employer] voice recognition software. Quite often unanticipated grammatical, syntax, homophones, and other interpretive errors are inadvertently transcribed by the computer software. Please disregard these errors. Please excuse any errors that have escaped final proofreading.  -----------------------------------------------------------------------------------------------------------------    Date: 8/2/2021  Patient Name: Mitzie Mcburney    History of Presenting Illness     Chief Complaint   Patient presents with    Cardiac arrest     Per EMS, pt was found in home in [place], shocked x7, was given EPI and AMIO. Currently intubated but breathing on own. Has cardiac hx. Acheived ROSC en route. History Provided By: EMS    HPI: Mitzie Mcburney is a 76 y.o. male, with significant pmhx of hypertension, CAD, COPD, peripheral artery disease, aortic valvular stenosis, anemia, hypokalemia, pacemaker (medtronic) who presents via EMS to the ED with altered mental status while sitting at the dinner table with family at approximately 2050. On EMSs arrival patient was unresponsive, cool to touch and without pulse. Resuscitation efforts initiated at that point. Patient received several rounds of epinephrine. Noted then to be in V. fib/V. tach and given 7 subsequent defibrillation shocks. Was also given a total of 450 of amiodarone. Patient had ROSC prior to arrival in the emergency department. Arrives with a 6-1/2 ET tube placed by EMS. Patient has spontaneous breathing with tube. Further HPI information will be obtained at this time due to patient's intubation status.     PCP: Viktoriya Miller MD      No Known Allergies      Current Facility-Administered Medications   Medication Dose Route Frequency Provider Last Rate Last Admin    glucose chewable tablet 16 g  4 Tablet Oral PRN Kemi Read, NP        dextrose (D50W) injection syrg 12.5-25 g  25-50 mL IntraVENous PRN Kemi Read, NP        glucagon (GLUCAGEN) injection 1 mg  1 mg IntraMUSCular PRN Kemi Read, NP        insulin lispro (HUMALOG) injection   SubCUTAneous Q6H Kemi Read, NP        pantoprazole (PROTONIX) 40 mg in 0.9% sodium chloride 10 mL injection  40 mg IntraVENous DAILY Kemi Read Y, NP        alcohol 62% (NOZIN) nasal  1 Ampule  1 Ampule Topical Q12H Kemi Read, NP        chlorhexidine (ORAL CARE KIT) 0.12 % mouthwash 15 mL  15 mL Oral Q12H Kemi Read, NP        potassium chloride 10 mEq in 100 ml IVPB  10 mEq IntraVENous Q1H Eula Asif MD        sodium chloride (NS) flush 5-40 mL  5-40 mL IntraVENous Q8H Kemi Read, NP        sodium chloride (NS) flush 5-40 mL  5-40 mL IntraVENous PRN Kemi Read, NP        acetaminophen (TYLENOL) suppository 650 mg  650 mg Rectal Q4H PRN Kemi Read, NP        chlorhexidine (PERIDEX) 0.12 % mouthwash 15 mL  15 mL Oral Q12H Kemi Read, NP        magnesium sulfate 2 g/50 ml IVPB (premix or compounded)  2 g IntraVENous Q1H Kemi Read, NP        heparin (porcine) injection 5,000 Units  5,000 Units SubCUTAneous Q8H Crow Lloyd Bloodgood, NP        propofol (DIPRIVAN) 10 mg/mL infusion  0-50 mcg/kg/min IntraVENous TITRATE Fan White MD 27.1 mL/hr at 08/03/21 0257 50 mcg/kg/min at 08/03/21 0257    amiodarone (CORDARONE) 375 mg/250 mL D5W infusion  0.5 mg/min IntraVENous CONTINUOUS Rosenda Birmingham MD        fentaNYL (PF) 1,500 mcg/30 mL (50 mcg/mL) infusion  0-200 mcg/hr IntraVENous TITRATE Rosenda Birmingham MD 1 mL/hr at 08/02/21 2341 50 mcg/hr at 08/02/21 2341    sodium chloride (NS) flush 5-40 mL  5-40 mL IntraVENous Q8H Kemi Read, NP        sodium chloride (NS) flush 5-40 mL  5-40 mL IntraVENous PRN Elsa Bright NP        polyethylene glycol (MIRALAX) packet 17 g  17 g Oral DAILY PRN Elsa Bright NP        piperacillin-tazobactam (ZOSYN) 3.375 g in 0.9% sodium chloride (MBP/ADV) 100 mL MBP  3.375 g IntraVENous Q8H Daria Lloyd, ANNA        vancomycin (VANCOCIN) 2000 mg in  ml infusion  2,000 mg IntraVENous ONCE Elsa Bright  mL/hr at 21 0314 2,000 mg at 21 0314    [START ON 2021] vancomycin (VANCOCIN) 1,000 mg in 0.9% sodium chloride 250 mL (VIAL-MATE)  1,000 mg IntraVENous Q24H Elsa Bright NP           Past History     Past Medical History:  Past Medical History:   Diagnosis Date    Anemia     Aortal valvular stenosis     CAD (coronary artery disease)     COPD     Hypercholesterolemia     Hypertension     PAD (peripheral artery disease) ([facility] [address]        Past Surgical History:  Past Surgical History:   Procedure Laterality Date    HX AORTIC VALVE REPLACEMENT  10/3/07    mosaic ultra porcine medtronic [id number]    VT CABG, ARTERY-VEIN, FOUR      VT INS NEW/RPLCMT PRM PM W/TRANSV ELTRD ATRIAL&VENT N/A 10/16/2020    INSERT PPM DUAL performed by Nehemias Lewis MD at [facility], [facility]/s Dr CATH LAB       Family History:  No family history on file. Social History:  Social History     Tobacco Use    Smoking status: Former Smoker     Quit date: 2007     Years since quittin.8    Smokeless tobacco: Never Used   Substance Use Topics    Alcohol use: No    Drug use: No       Allergies:  No Known Allergies      Review of Systems   Review of Systems   Unable to perform ROS: Intubated       Physical Exam   Physical Exam  Vitals and nursing note reviewed. Constitutional:       General: He is in acute distress. Appearance: He is well-developed. He is not diaphoretic. Interventions: He is intubated. HENT:      Head: Normocephalic and atraumatic. Mouth/Throat:      Pharynx: No oropharyngeal exudate. Eyes:      Conjunctiva/sclera: Conjunctivae normal.   Neck:      Vascular: No JVD. Cardiovascular:      Rate and Rhythm: Normal rate and regular rhythm. Heart sounds: Normal heart sounds. No murmur heard. No friction rub. Pulmonary:      Effort: Respiratory distress present. He is intubated. Breath sounds: Normal breath sounds. No stridor. No wheezing or rales. Chest:      Chest wall: No crepitus. Comments: Lateral breath sounds  Abdominal:      General: There is no distension. Palpations: Abdomen is soft. Musculoskeletal:      Cervical back: Neck supple. No signs of trauma. Skin:     General: Skin is warm and dry. Findings: No rash. Neurological:      Mental Status: He is unresponsive. GCS: GCS eye subscore is 1. GCS verbal subscore is 1. GCS motor subscore is 1. Diagnostic Study Results     Labs -     Recent Results (from the past 12 hour(s))   CBC WITH AUTOMATED DIFF    Collection Time: 08/02/21  9:55 PM   Result Value Ref Range    WBC 42.8 (H) 4.1 - 11.1 K/uL    RBC 4.16 4.10 - 5.70 M/uL    HGB 10.8 (L) 12.1 - 17.0 g/dL    HCT 34.8 (L) 36.6 - 50.3 %    MCV 83.7 80.0 - 99.0 FL    MCH 26.0 26.0 - 34.0 PG    MCHC 31.0 30.0 - 36.5 g/dL    RDW 15.9 (H) 11.5 - 14.5 %    PLATELET 021 681 - 463 K/uL    MPV 11.9 8.9 - 12.9 FL    NRBC 0.0 0  WBC    ABSOLUTE NRBC 0.00 0.00 - 0.01 K/uL    NEUTROPHILS 64 32 - 75 %    BAND NEUTROPHILS 12 %    LYMPHOCYTES 11 (L) 12 - 49 %    MONOCYTES 8 5 - 13 %    EOSINOPHILS 2 0 - 7 %    BASOPHILS 0 0 - 1 %    METAMYELOCYTES 2 %    MYELOCYTES 1 %    IMMATURE GRANULOCYTES 0 0.0 - 0.5 %    ABS. NEUTROPHILS 32.5 (H) 1.8 - 8.0 K/UL    ABS. LYMPHOCYTES 4.7 (H) 0.8 - 3.5 K/UL    ABS. MONOCYTES 3.4 (H) 0.0 - 1.0 K/UL    ABS. EOSINOPHILS 0.9 (H) 0.0 - 0.4 K/UL    ABS. BASOPHILS 0.0 0.0 - 0.1 K/UL    ABS. IMM.  GRANS. 0.0 0.00 - 0.04 K/UL    DF MANUAL      PLATELET COMMENTS Large Platelets      RBC COMMENTS NORMOCYTIC, NORMOCHROMIC     METABOLIC PANEL, COMPREHENSIVE    Collection Time: 08/02/21  9:55 PM   Result Value Ref Range    Sodium 138 136 - 145 mmol/L    Potassium 2.7 (LL) 3.5 - 5.1 mmol/L    Chloride 97 97 - 108 mmol/L    CO2 21 21 - 32 mmol/L    Anion gap 20 (H) 5 - 15 mmol/L    Glucose 250 (H) 65 - 100 mg/dL    BUN 27 (H) 6 - 20 MG/DL    Creatinine 1.77 (H) 0.70 - 1.30 MG/DL    BUN/Creatinine ratio 15 12 - 20      GFR est AA 46 (L) >60 ml/min/1.73m2    GFR est non-AA 38 (L) >60 ml/min/1.73m2    Calcium 8.4 (L) 8.5 - 10.1 MG/DL    Bilirubin, total 0.8 0.2 - 1.0 MG/DL    ALT (SGPT) 78 12 - 78 U/L    AST (SGOT) 74 (H) 15 - 37 U/L    Alk. phosphatase 92 45 - 117 U/L    Protein, total 5.7 (L) 6.4 - 8.2 g/dL    Albumin 1.8 (L) 3.5 - 5.0 g/dL    Globulin 3.9 2.0 - 4.0 g/dL    A-G Ratio 0.5 (L) 1.1 - 2.2     TROPONIN I    Collection Time: 08/02/21  9:55 PM   Result Value Ref Range    Troponin-I, Qt. 0.43 (H) <0.05 ng/mL   SAMPLES BEING HELD    Collection Time: 08/02/21  9:55 PM   Result Value Ref Range    SAMPLES BEING HELD [facility]     COMMENT        Add-on orders for these samples will be processed based on acceptable specimen integrity and analyte stability, which may vary by analyte.    MAGNESIUM    Collection Time: 08/02/21  9:55 PM   Result Value Ref Range    Magnesium 1.8 1.6 - 2.4 mg/dL   PHOSPHORUS    Collection Time: 08/02/21  9:55 PM   Result Value Ref Range    Phosphorus 8.8 (H) 2.6 - 4.7 MG/DL   NT-PRO BNP    Collection Time: 08/02/21  9:55 PM   Result Value Ref Range    NT pro-BNP 3,819 (H) <450 PG/ML   POC CHEM8    Collection Time: 08/02/21  9:57 PM   Result Value Ref Range    CO2, POC 24 19 - 24 MMOL/L    Glucose,  (H) 74 - 106 MG/DL    Creatinine, POC 1.7 (H) 0.6 - 1.3 MG/DL    GFRAA, POC 48 (L) >60 ml/min/1.73m2    GFRNA, POC 39 (L) >60 ml/min/1.73m2    Sodium,  136 - 145 MMOL/L    Potassium, POC 2.6 (LL) 3.5 - 5.5 MMOL/L    Calcium, ionized (POC) 1.30 1.12 - 1.32 mmol/L    Chloride, POC 95 (L) 100 - 108 MMOL/L    Anion gap, POC 20 10 - 20     POC LACTIC ACID    Collection Time: 08/02/21  9:57 PM   Result Value Ref Range    Lactic Acid (POC) 14.05 (HH) 0.40 - 2.00 mmol/L   EKG, 12 LEAD, INITIAL    Collection Time: 08/02/21  9:58 PM   Result Value Ref Range    Ventricular Rate 78 BPM    Atrial Rate 46 BPM    QRS Duration 130 ms    Q-T Interval 504 ms    QTC Calculation (Bezet) 574 ms    Calculated R Axis -50 degrees    Calculated T Axis 132 degrees    Diagnosis        Suspect unspecified pacemaker failure  Undetermined rhythm  Left axis deviation  Nonspecific intraventricular block  T wave abnormality, consider anterolateral ischemia  When compared with ECG of 15-OCT-2020 14:22,  Current undetermined rhythm precludes rhythm comparison, needs review  QRS axis shifted left  T wave inversion no longer evident in Inferior leads  QT has lengthened     POC TROPONIN-I    Collection Time: 08/02/21 10:06 PM   Result Value Ref Range    Troponin-I (POC) 0.26 (H) 0.00 - 0.08 ng/mL   BLOOD GAS, ARTERIAL    Collection Time: 08/02/21 10:41 PM   Result Value Ref Range    pH 7.28 (L) 7.35 - 7.45      PCO2 42 35 - 45 mmHg    PO2 222 (H) 80 - 100 mmHg    O2 SAT 99 (H) 92 - 97 %    BICARBONATE 19 (L) 22 - 26 mmol/L    BASE DEFICIT 7.5 mmol/L    O2 METHOD VENT      FIO2 60 %    MODE SIMV      Tidal volume 500.0      SET RATE 15      PRESSURE SUPPORT 10      PEEP/CPAP 5.0      Sample source ARTERIAL      SITE RIGHT RADIAL      JACINDA'S TEST YES     COVID-19 RAPID TEST    Collection Time: 08/02/21 11:48 PM   Result Value Ref Range    Specimen source Please find results under separate order      COVID-19 rapid test Not detected NOTD     LACTIC ACID    Collection Time: 08/03/21  1:00 AM   Result Value Ref Range    Lactic acid 3.0 (HH) 0.4 - 2.0 MMOL/L   BLOOD GAS + IONIZED CALCIUM    Collection Time: 08/03/21  4:05 AM   Result Value Ref Range    pH 7.45 7.35 - 7.45      PCO2 36 35 - 45 mmHg PO2 112 (H) 80 - 100 mmHg    BICARBONATE 24 22 - 26 mmol/L    BASE EXCESS 0.7 mmol/L    O2 SAT 98 (H) 92 - 97 %    Calcium, ionized 1.08 (L) 1.13 - 1.32 mmol/L    O2 METHOD VENT      FIO2 50 %    Tidal volume 500.0      SET RATE 18      PEEP/CPAP 5.0      Sample source ARTERIAL      SITE RIGHT RADIAL      JACINDA'S TEST YES         Radiologic Studies -   XR CHEST PORT   Final Result   No acute findings. Central venous catheter tip projects just above   level of aortic arch and is uncertain and which vessel this may be. CT ABD PELV W CONT   Final Result   Possible mild segmental colitis of the hepatic flexure with   differential inclusive of infectious and inflammatory etiologies and less likely   ischemic. There are associated air-fluid levels in colon compatible with history   of diarrhea. No acute findings otherwise with incidentals as above. XR CHEST PORT   Final Result   No acute findings. Enteric tube in position. CT HEAD WO CONT   Final Result   No acute intracranial hemorrhage, mass or infarct. Cerebral atrophy   and white matter change most compatible with chronic small vessel ischemic   and/or senescent change. Intracranial atherosclerosis. Acute paranasal sinus   disease         CTA CHEST W OR W WO CONT   Final Result      1. No pulmonary embolus identified on examination compromised by respiratory   motion. 2. Incidentals as above including 4.9 x 5.2 cm ascending aortic aneurysm,   coronary artery disease, and evidence of right heart failure with contrast   reflux into hepatic veins. XR CHEST PORT   Final Result   Endotracheal tube in expected position. No acute findings.       XR CHEST PORT    (Results Pending)   XR NECK SOFT TISSUE    (Results Pending)     CT Results  (Last 48 hours)               08/03/21 0212  CT ABD PELV W CONT Final result    Impression:  Possible mild segmental colitis of the hepatic flexure with   differential inclusive of infectious and inflammatory etiologies and less likely   ischemic. There are associated air-fluid levels in colon compatible with history   of diarrhea. No acute findings otherwise with incidentals as above. Narrative:  EXAM: CT ABD PELV W CONT       INDICATION: profuse diarrhea, lacate of 14, evaluate for colitis       COMPARISON: CTA thorax of earlier today. CONTRAST: 100 mL of Isovue-370. TECHNIQUE:    Following the uneventful intravenous administration of contrast, thin axial   images were obtained through the abdomen and pelvis. Coronal and sagittal   reconstructions were generated. Oral contrast was not administered. CT dose   reduction was achieved through use of a standardized protocol tailored for this   examination and automatic exposure control for dose modulation. FINDINGS:    LOWER THORAX: Posterior atelectasis. Coronary artery calcifications and   postsurgical changes of CABG. LIVER: Small right lobe cyst. Reflux of contrast into hepatic veins. No focal   lesion otherwise. BILIARY TREE: Gallbladder contains intraluminal stones and radiodense material   but otherwise is unremarkable. CBD is not dilated. SPLEEN: Calcified granuloma. Otherwise unremarkable. PANCREAS: No mass or ductal dilatation. ADRENALS: Unremarkable. KIDNEYS: No mass, calculus, or hydronephrosis. STOMACH: Enteric tube traverses into the gastric lumen. Otherwise unremarkable. SMALL BOWEL: No dilatation or wall thickening. COLON: Mild inflammatory stranding around the hepatic flexure. Colon shows   diffuse air-fluid levels. No dilation or wall thickening. Noninflamed appearing   sigmoid colon diverticula. APPENDIX: Unremarkable. PERITONEUM: No ascites or pneumoperitoneum. RETROPERITONEUM: Atherosclerotic calcification without aneurysm or dissection. Celiac axis, SMA, and ISIDRO opacify normally with calcific and soft plaque but no   clear stenosis or aneurysm. No enlarged lymphadenopathy.    REPRODUCTIVE ORGANS: Prostate and seminal vesicles appear unremarkable. URINARY BLADDER: Collapsed around Velazquez catheter balloon and opacified with   contrast. No evident mass. BONES: Degenerative spine change. No fracture or aggressive lesion. ABDOMINAL WALL: No mass or hernia. ADDITIONAL COMMENTS: N/A           08/02/21 2322  CT HEAD WO CONT Final result    Impression:  No acute intracranial hemorrhage, mass or infarct. Cerebral atrophy   and white matter change most compatible with chronic small vessel ischemic   and/or senescent change. Intracranial atherosclerosis. Acute paranasal sinus   disease           Narrative:  EXAM: CT HEAD WO CONT       INDICATION: ams, post arrest       COMPARISON: None. CONTRAST: None. TECHNIQUE: Unenhanced CT of the head was performed using 5 mm images. Brain and   bone windows were generated. Coronal and sagittal reformats. CT dose reduction   was achieved through use of a standardized protocol tailored for this   examination and automatic exposure control for dose modulation. FINDINGS:   The ventricles and sulci are normal in size, shape and configuration. . There is   periventricular and subcortical white matter hypodensity. There is no   intracranial hemorrhage, extra-axial collection, or mass effect. The basilar   cisterns are open. No CT evidence of acute infarct. Intracranial atherosclerosis       The bone windows demonstrate no abnormalities. The visualized portions of the   paranasal sinuses and mastoid air cells show extensive opacification with   air-fluid levels in the ethmoid, sphenoid, and maxillary sinuses. 08/02/21 2322  CTA [facility] CONT Final result    Impression:      1. No pulmonary embolus identified on examination compromised by respiratory   motion. 2. Incidentals as above including 4.9 x 5.2 cm ascending aortic aneurysm,   coronary artery disease, and evidence of right heart failure with contrast   reflux into hepatic veins. Narrative:  EXAM: CTA CHEST W OR W WO CONT       INDICATION: osh cardiac arrest       COMPARISON: None. CONTRAST: 100 mL of Isovue-370. TECHNIQUE:    Precontrast  images were obtained to localize the volume for acquisition. Multislice helical CT arteriography was performed from the diaphragm to the   thoracic inlet during uneventful rapid bolus intravenous contrast   administration. Lung and soft tissue windows were generated. Coronal and   sagittal images were generated and 3D post processing consisting of coronal   maximum intensity images was performed. CT dose reduction was achieved through   use of a standardized protocol tailored for this examination and automatic   exposure control for dose modulation. FINDINGS:       Examination is overall compromised by respiratory motion, particularly in   evaluation of peripheral pulmonary arteries. The lungs show some dependent atelectasis and subpleural scarring but otherwise   are clear of mass, nodule, airspace disease or edema. The pulmonary arteries appear well enhanced and no pulmonary emboli are   identified. There is no mediastinal or hilar adenopathy or mass. The aorta enhances normally   without evidence of dissection with ascending aortic aneurysm measuring 4.9 x   5.2 cm. The heart shows right chamber enlargement with coronary artery   calcifications. There is reflux of contrast into hepatic veins. The visualized portions of the upper abdominal organs show radiodense material   within the partially visualized gallbladder lumen likely reflecting vicarious   excretion of contrast but otherwise are normal.               CXR Results  (Last 48 hours)               08/03/21 0356  XR CHEST PORT Final result    Impression:  No acute findings. Central venous catheter tip projects just above   level of aortic arch and is uncertain and which vessel this may be.        Narrative:  EXAM: XR CHEST PORT       INDICATION: new central line       COMPARISON: Chest x-ray 8/3/2021. FINDINGS: A portable AP radiograph of the chest was obtained at 03:33 hours. The   patient is on a cardiac monitor. The distal portion of a central venous catheter   projects just above the level of the aortic arch. Enteric tube traverses   expected course to below the diaphragm into the left upper quadrant. Pacemaker   generator body projects over the left chest wall with intact appearing leads   traversing in expected course. Lungs are clear with no pneumothorax. There are   median sternotomy wires and surgical clips compatible with prior CABG. The   cardiac and mediastinal contours and pulmonary vascularity are normal.  The   bones and soft tissues are grossly within normal limits. 08/03/21 0114  XR CHEST PORT Final result    Impression:  No acute findings. Enteric tube in position. Narrative:  EXAM: XR CHEST PORT       INDICATION: NG tube placement       COMPARISON: CT 8/2/2021 and chest x-ray 8/2/2021. FINDINGS: A portable AP radiograph of the chest was obtained at 00:59 hours. The   patient is on a cardiac monitor. An enteric tube traverses expected course to   extend below the diaphragm into the left upper quadrant. The lungs are clear. Pacemaker generator body projects over the left chest wall with intact appearing   leads traversing in expected course. There are median sternotomy wires and   surgical clips compatible with prior CABG. The cardiac and mediastinal contours   and pulmonary vascularity are normal.  The bones and soft tissues are grossly   within normal limits. 08/02/21 2157  XR CHEST PORT Final result    Impression:  Endotracheal tube in expected position. No acute findings. Narrative:  EXAM: XR CHEST PORT       INDICATION: intubation       COMPARISON: Chest x-ray 10/16/2020. FINDINGS: A portable AP radiograph of the chest was obtained at 21:42 hours. The   patient is on a cardiac monitor. There is an endotracheal tube projecting over   the tracheal lucency with the tip approximately 6 cm above the mayra. The lungs   are clear with no pneumothorax. Pacemaker generator body projects over the left   chest wall with intact appearing leads traversing in expected course. There are   median sternotomy wires and surgical clips compatible with prior CABG. The   cardiac and mediastinal contours and pulmonary vascularity are normal.  The   chest wall structures and visualized upper abdomen show no acute findings with   incidental note of degenerative spine and shoulder changes. Medical Decision Making   I am the first provider for this patient. I reviewed the vital signs, available nursing notes, past medical history, past surgical history, family history and social history. Vital Signs-Reviewed the patient's vital signs.   Patient Vitals for the past 12 hrs:   Temp Pulse Resp BP SpO2   08/03/21 0404 98 °F (36.7 °C) 60 18  100 %   08/03/21 0400  (!) 59 18 (!) 103/55 100 %   08/03/21 0345  63 16 (!) 104/57 100 %   08/03/21 0330  68 19 (!) 111/57 100 %   08/03/21 0324   20  100 %   08/03/21 0315  67 19 (!) 92/54 100 %   08/03/21 0300  (!) 117 23 110/70 100 %   08/03/21 0245  (!) 118 22  100 %   08/03/21 0230  (!) 118 18  91 %   08/03/21 0227 97.7 °F (36.5 °C) (!) 109 20 136/88 98 %   08/03/21 0220   (!) 42     08/03/21 0030  68 21 112/64 100 %   08/02/21 2235 (!) 96.6 °F (35.9 °C) 78 26 105/86 100 %   08/02/21 2220  85 30 (!) 84/46 100 %   08/02/21 2158  97 26  100 %   08/02/21 2155  80 15 114/65 100 %   08/02/21 2148  83 18 114/65 (!) 87 %       Pulse Oximetry Analysis - 100% on 60% FiO2 vented on SIMV    Records Reviewed/Interpretted: Nursing Notes from triage and Old Medical Records, patient seen in April for severe anemia and hypokalemia    Provider Notes (Medical Decision Making):     DDX:  Tach, electrolyte derangement, STEMI    Plan:  Chest x-ray for tube confirmation, labs, troponin, ABG, replete electrolytes, propofol    Impression:  Cardiac arrest, hypokalemia    ED Course:   Initial assessment performed. The patients presenting problems have been discussed, and they are in agreement with the care plan formulated and outlined with them. I have encouraged them to ask questions as they arise throughout their visit. I reviewed our electronic medical record system for any past medical records that were available that may contribute to the patients current condition, the nursing notes and and vital signs from today's visit  Nursing notes will be reviewed as they become available in realtime while the pt has been in the ED. Edgardo Simons MD    I personally reviewed/interpreted pt's imaging. Agree with official read by radiology as noted above. Edgardo Simons MD      PROGRESS NOTE  Started on amiodarone drip due to increased ectopy with nonsustained runs of V. tach. Will replete hypokalemia. Will discuss patient with ICU nurse practitioner for admission. Blood pressure improved with initial bolus of IV fluids and titration down of propofol. Will start fentanyl at this time for sedation. ICU CONSULT NOTE:   Edgardo Simons MD spoke with NP Martine Art,   Specialty: [facility] NP due to cardiac arrest and ROSC. Discussed pt's HPI and available diagnostic results thus far. Consultant will evaluate patient for admission.   Edgardo Simons MD               Critical Care Time:       CRITICAL CARE NOTE  IMPENDING DETERIORATION -Airway, Respiratory, Cardiovascular, CNS, Metabolic, Renal and Hepatic  ASSOCIATED RISK FACTORS - Hypotension, Shock, Hypoxia, Trauma, Dysrhythmia, Metabolic changes, Dehydration, Vascular Compromise and CNS Decompensation  MANAGEMENT- Bedside Assessment and Supervision of Care  INTERPRETATION -  Xrays, CT Scan, Blood Gases, ECG, Blood Pressure and Cardiac Output Measures   INTERVENTIONS - hemodynamic mngmt, vent mngmt, gastric tube, vascular control, Neurologic interventions  and Metobolic interventions  CASE REVIEW - Hospitalist/Intensivist, Nursing and Family  TREATMENT RESPONSE -Improved  PERFORMED BY - Self  NOTES   :  I have spent 60 minutes of critical care time involved in lab review, consultations with specialist, family decision- making, bedside attention and documentation excluding time spent on any separately billed procedures. During this entire length of time I was immediately available to the patient . Cindy Grewal MD        Diagnosis     Clinical Impression:   1. Cardiac arrest ([address]    2. V-tach ([address]    3. Hypokalemia    4. Hypotension, unspecified hypotension type        PLAN:  1.   Admit to ICU

## 2021-08-03 NOTE — CONSULTS
Neurology Note    Patient ID:  Jerlean Gottron  [id number]  76 y.o.  1945      Date of Consultation:  August 3, 2021    Referring Physician: Sadi Olivier    Reason for Consultation: encephalopathy      Assessment and Plan:    The patient is a pleasant 58-year-old gentleman who was admitted after undergoing cardiac arrest.  He was placed on hypothermia protocol. His neurological examination does reveal intact brainstem reflexes and no spontaneous movement but minimal withdrawal to painful stimulation. Cardiac arrest with concern for anoxic brain injury:    I will obtain a stat EEG this morning to ensure there is no nonconvulsive status epilepticus. If there is seizure activity seen, patient will need prolonged EEG monitoring. If the EEG is negative, the patient will receive a follow-up EEG after the rewarming process. I will continue to follow neurological examination. Pending changes, additional neuroimaging may be necessary. Additional insight into prognosis pending how the patient's neurological condition progresses over the first 72 hours. Neurology will continue to follow closely during the hospitalization. Subjective: no verbal output. History of Present Illness:   Jerlean Gottron is a 76 y.o. male with a history of aortic valve replacement, coronary artery disease, bypass surgery, COPD, pacemaker implantation who presented to [facility] on 8/3/2021 after having a out of hospital V. tach arrest.  The patient did receive amiodarone, epinephrine and shocked x7 with return of circulation. Hypothermia was initiated. The patient has been off sedation since 3:30. There was a questionable history of twitching of the face reported by nursing but there is none seen this morning.     Past Medical History:   Diagnosis Date    Anemia     Aortal valvular stenosis     CAD (coronary artery disease)     COPD     Hypercholesterolemia     Hypertension     PAD [facility]peripheral artery disease) [facility])         Past Surgical History:   Procedure Laterality Date    HX AORTIC VALVE REPLACEMENT  10/3/07    mosaic ultra porcine medtronic [id number]    PA CABG, ARTERY-VEIN, FOUR      PA INS NEW/RPLCMT PRM PM W/TRANSV ELTRD ATRIAL&VENT N/A 10/16/2020    INSERT PPM DUAL performed by Arielle Wilson MD at [address], [facility] CATH LAB        No family history on file. I am unable to obtain a family history. There was no family at the bedside this a.m. Social History     Tobacco Use    Smoking status: Former Smoker     Quit date: 2007     Years since quittin.8    Smokeless tobacco: Never Used   Substance Use Topics    Alcohol use: No        No Known Allergies     Prior to Admission medications    Medication Sig Start Date End Date Taking? Authorizing Provider   furosemide (LASIX) 40 mg tablet Take  by mouth daily. Provider, Historical   losartan (COZAAR) 25 mg tablet Take  by mouth daily. Provider, Historical   magnesium 250 mg tab Take  by mouth. Provider, Historical   multivitamin (ONE A DAY) tablet Take 1 Tab by mouth daily. Provider, Historical   ibuprofen (MOTRIN) 800 mg tablet Take  by mouth. Provider, Historical   potassium chloride SR (KLOR-CON 10) 10 mEq tablet Take 1 Tab by mouth two (2) times a day. 21   Clay Ruiz MD   rivaroxaban (XARELTO) 20 mg tab tablet Take 1 Tab by mouth daily (with dinner). 10/11/20   Veronica Das MD   atorvastatin (LIPITOR) 40 mg tablet Take 1 Tab by mouth nightly. 10/11/20   Veronica Das MD   spironolactone (ALDACTONE) 25 mg tablet Take 1 Tab by mouth daily. 10/12/20   Veronica Das MD   carvedilol (COREG) 25 mg tablet Take 1 Tab by mouth two (2) times daily (with meals). Patient taking differently: Take 25 mg by mouth daily. 18   Susie Salazar MD   pantoprazole (PROTONIX) 40 mg tablet Take 40 mg by mouth daily.     Other, MD Abhi     Current Facility-Administered Medications Medication Dose Route Frequency    glucose chewable tablet 16 g  4 Tablet Oral PRN    dextrose (D50W) injection syrg 12.5-25 g  25-50 mL IntraVENous PRN    glucagon (GLUCAGEN) injection 1 mg  1 mg IntraMUSCular PRN    insulin lispro (HUMALOG) injection   SubCUTAneous Q6H    pantoprazole (PROTONIX) 40 mg in 0.9% sodium chloride 10 mL injection  40 mg IntraVENous DAILY    alcohol 62% (NOZIN) nasal  1 Ampule  1 Ampule Topical Q12H    chlorhexidine (ORAL CARE KIT) 0.12 % mouthwash 15 mL  15 mL Oral Q12H    sodium chloride (NS) flush 5-40 mL  5-40 mL IntraVENous Q8H    sodium chloride (NS) flush 5-40 mL  5-40 mL IntraVENous PRN    acetaminophen (TYLENOL) suppository 650 mg  650 mg Rectal Q4H PRN    heparin (porcine) injection 5,000 Units  5,000 Units SubCUTAneous Q8H    NOREPINephrine (LEVOPHED) 8 mg in 5% dextrose 250mL (32 mcg/mL) infusion  0.5-30 mcg/min IntraVENous TITRATE    albumin human 5% (BUMINATE) 5 % solution        metroNIDAZOLE (FLAGYL) IVPB premix 500 mg  500 mg IntraVENous Q8H    propofol (DIPRIVAN) 10 mg/mL infusion  0-50 mcg/kg/min IntraVENous TITRATE    amiodarone (CORDARONE) 375 mg/250 mL D5W infusion  0.5 mg/min IntraVENous CONTINUOUS    fentaNYL (PF) 1,500 mcg/30 mL (50 mcg/mL) infusion  0-200 mcg/hr IntraVENous TITRATE    sodium chloride (NS) flush 5-40 mL  5-40 mL IntraVENous Q8H    sodium chloride (NS) flush 5-40 mL  5-40 mL IntraVENous PRN    polyethylene glycol (MIRALAX) packet 17 g  17 g Oral DAILY PRN       Review of Systems:    [x]Unable to obtain  ROS due to  []mental status change  []sedated   [x]intubated    Objective:     Visit Vitals  /68   Pulse 70   Temp (!) 96.2 °F (35.7 °C)   Resp 18   Wt 198 lb 13.7 oz (90.2 kg)   SpO2 (!) 77%   BMI 30.24 kg/m²       Physical examination:    Neurological examination    Neck: no carotid bruits  Lungs: clear to auscultation  Heart:  no murmurs, regular rate  Lower extremity: no edema    Language: coma    Cranial nerves:   Perrrla  Fundoscopic examination revealed venous pulsations  Facial sensation/motor:  Minimal grimace to noxious stimulation  Corneal reflex intact  Oculocephalic reflex intact      Motor: Tone normal  No evidence of fasciculations  No spontaneous limb movement      Sensory:   Minimal withdrawal noted in all 4 extremities but slightly more on the right upper and lower extremity      Reflexes:    Right Left  Biceps  2 2  Triceps 2 2  Brachiorad. 2 2  Patella  2 2  Achilles 2 2    Plantar response:  flexor bilaterally      Cerebellar testing:  no abnormal movements      Gait: unable to assess due to cognitive status      Labs:     Lab Results   Component Value Date/Time    Hemoglobin A1c 5.4 08/03/2021 01:00 AM    Sodium 133 (L) 08/03/2021 03:39 AM    Potassium 3.2 (L) 08/03/2021 03:39 AM    Chloride 96 (L) 08/03/2021 03:39 AM    Glucose 265 (H) 08/03/2021 03:39 AM    BUN 34 (H) 08/03/2021 03:39 AM    Creatinine 1.87 (H) 08/03/2021 03:39 AM    Calcium 7.3 (L) 08/03/2021 03:39 AM    WBC 25.9 (H) 08/03/2021 03:39 AM    HCT 33.3 (L) 08/03/2021 03:39 AM    HGB 10.9 (L) 08/03/2021 03:39 AM    PLATELET 518 55/33/3514 03:39 AM       Imaging:    No results found for this or any previous visit. Results from [place] encounter on 08/02/21    CT ABD PELV W CONT    Narrative  EXAM: CT ABD PELV W CONT    INDICATION: profuse diarrhea, lacate of 14, evaluate for colitis    COMPARISON: CTA thorax of earlier today. CONTRAST: 100 mL of Isovue-370. TECHNIQUE:  Following the uneventful intravenous administration of contrast, thin axial  images were obtained through the abdomen and pelvis. Coronal and sagittal  reconstructions were generated. Oral contrast was not administered. CT dose  reduction was achieved through use of a standardized protocol tailored for this  examination and automatic exposure control for dose modulation. FINDINGS:  LOWER THORAX: Posterior atelectasis.  Coronary artery calcifications and  postsurgical changes of CABG. LIVER: Small right lobe cyst. Reflux of contrast into hepatic veins. No focal  lesion otherwise. BILIARY TREE: Gallbladder contains intraluminal stones and radiodense material  but otherwise is unremarkable. CBD is not dilated. SPLEEN: Calcified granuloma. Otherwise unremarkable. PANCREAS: No mass or ductal dilatation. ADRENALS: Unremarkable. KIDNEYS: No mass, calculus, or hydronephrosis. STOMACH: Enteric tube traverses into the gastric lumen. Otherwise unremarkable. SMALL BOWEL: No dilatation or wall thickening. COLON: Mild inflammatory stranding around the hepatic flexure. Colon shows  diffuse air-fluid levels. No dilation or wall thickening. Noninflamed appearing  sigmoid colon diverticula. APPENDIX: Unremarkable. PERITONEUM: No ascites or pneumoperitoneum. RETROPERITONEUM: Atherosclerotic calcification without aneurysm or dissection. Celiac axis, SMA, and ISIDRO opacify normally with calcific and soft plaque but no  clear stenosis or aneurysm. No enlarged lymphadenopathy. REPRODUCTIVE ORGANS: Prostate and seminal vesicles appear unremarkable. URINARY BLADDER: Collapsed around Velazquez catheter balloon and opacified with  contrast. No evident mass. BONES: Degenerative spine change. No fracture or aggressive lesion. ABDOMINAL WALL: No mass or hernia. ADDITIONAL COMMENTS: N/A    Impression  Possible mild segmental colitis of the hepatic flexure with  differential inclusive of infectious and inflammatory etiologies and less likely  ischemic. There are associated air-fluid levels in colon compatible with history  of diarrhea. No acute findings otherwise with incidentals as above. I did independently review the head CT from August 2, 2021. There was no acute abnormality. There was mild atrophy and chronic small vessel ischemic changes.            Active Problems:    Cardiac arrest ([facility] [address] (8/2/2021)        45 minutes was spent providing medical care of this critically ill patient reviewing records, obtaining additional history from family, examining patient , discussing with collaborating physicians and nursing, and discussing treatment plans.              Signed By:  Scott Davis DO FAAN    August 3, 2021

## 2021-08-03 NOTE — PROGRESS NOTES
0300.  NP at bedside placing line for central access/targeted temperature management. 5157. Line in place, cxr done for confirmation. New joe placed for temp management. Esophageal probe placed, to monitor. Intraosseous left arm infiltrated, arm swollen, NP Vonnie aware, line removed. 8343. Pt wife at bedside. Gave her yellow metal chain from neck and silver colored ring pt was wearing.    0506. Pt is hooked up to zoll cooling system. Current temp=98.4. Goal temp=96.8.    0648. Propofol and fentanyl on hold currently due to hypotension and unresponsiveness. Unable to complete admission screenings due to unresponsiveness and no family at bedside.

## 2021-08-03 NOTE — PROGRESS NOTES
Spiritual Care Assessment/Progress Note  [facility]      NAME: Ngoc Null      MRN: [id number]  AGE: 76 y.o. SEX: male  [employer]ar[employer] Affiliation: Synagogue   Language: English     8/3/2021     Total Time (in minutes): 225     Spiritual Assessment begun in [facility] EMERGENCY DEPT through conversation with:         []Patient        [x] Family    [] Friend(s)        Reason for Consult: Code Blue/99     Spiritual beliefs: (Please include comment if needed)     [x] Identifies with a jaz tradition:         [] Supported by a jaz community:            [] Claims no spiritual orientation:           [] Seeking spiritual identity:                [] Adheres to an individual form of spirituality:           [x] Not able to assess:  Patient                         Identified resources for coping:      [x] Prayer                               [] Music                  [] Guided Imagery     [x] Family/friends                 [] Pet visits     [] Devotional reading                         [] Unknown     [] Other:                                             Interventions offered during this visit: (See comments for more details)    Patient Interventions: Initial visit     Family/Friend(s):  Affirmation of emotions/emotional suffering, Normalization of emotional/spiritual concerns, Prayer (assurance of)     Plan of Care:     [x] Support spiritual and/or cultural needs    [] Support AMD and/or advance care planning process      [] Support grieving process   [] Coordinate Rites and/or Rituals    [] Coordination with community clergy   [] No spiritual needs identified at this time   [] Detailed Plan of Care below (See Comments)  [] Make referral to Music Therapy  [] Make referral to Pet Therapy     [] Make referral to Addiction services  [] Make referral to [facility]  [] Make referral to Spiritual Care Partner  [] No future visits requested        [x] Follow up upon further referrals     Comments: Responded to page from ER nursing staff to support family of patient. Met with family in the ER Consult room and offered pastoral support for them as they reviewed the events that led to patient's hospitalization. Present with physician as they consulted and discussed a plan of care. Provided supportive listening and relevant updates as needed to the family. Patient and wife are celebrating their thirty-fifth wedding anniversary today (Monday). One of their daughters, who is a nurse is en-route to the hospital from [place], [place]. Offered a prayer and words of encouragement for the family. Escorted them to the CCU waiting room as patient is being transferred to CCU 2518. Advised them of  availability. Rev.  Berhane Velasquez,   Paging Service: [phone])

## 2021-08-03 NOTE — CONSULTS
EP/ ARRHYTHMIA  CONSULT requested by Dr. Samanta Pittman secondary to cardiac arrest    Patient ID:  Patient: Gabrielle Walker  MRN: [id number]  Age: 76 y.o.  : 1945    Date of  Admission: 2021  9:45 PM   PCP:  Patrice Miller MD    Assessment: 1. Resuscitated cardiac arrest now CODE ICE. Episodes of fast VT >200 bpm confirmed on pacemaker check. 2. Sick sinus syndrome with a remote dual chamber Medtronic pacemaker implant. 3. History of BARBARA-cardioversion for typical atrial flutter in 10/2020. 4. CAD with remote CABG x 4.  5. Aortic stenosis s/p porcine AVR. 6. Chronic ischemic cardiomyopathy EF 15-20%. 7. Chronic systolic CHF. 8. Chronic anticoagulation. 9. DNR. Plan:     1. Medtronic pacemaker interrogated by the rep. I reviewed the pertinent findings with the rep and will review all the tracings. 2. The pacemaker base rate was changed to 80 from 60 bpm.  3. Continue amiodarone infusion for now. Discussed with nursing. CODE ICE continues. [x]       High complexity decision making was performed in this patient at high risk for decompensation with multiple organ involvement. Gabrielle Walker is a 76 y.o. male with a history of  resuscitated cardiac arrest.  He is currently intubated, mechanically ventilated, in the ICU. He is on sedation with propofol and also pressor. Amiodarone infusion was started. He cannot give a history.       Past Medical History:   Diagnosis Date    Anemia     Aortal valvular stenosis     CAD (coronary artery disease)     COPD     Hypercholesterolemia     Hypertension     [facility]per[facility]         Past Surgical History:   Procedure Laterality Date    HX AORTIC VALVE REPLACEMENT  10/3/07    mosaic ultra porcine medtronic [id number]    VT CABG, ARTERY-VEIN, FOUR      VT INS NEW/RPLCMT PRM PM W/TRANSV ELTRD ATRIAL&VENT N/A 10/16/2020    INSERT PPM DUAL performed by Diamond Herrera MD at [address] [address], [facility]/s  CATH LAB Social History     Tobacco Use    Smoking status: Former Smoker     Quit date: 2007     Years since quittin.8    Smokeless tobacco: Never Used   Substance Use Topics    Alcohol use: No        No family history on file.      No Known Allergies       Current Facility-Administered Medications   Medication Dose Route Frequency    glucose chewable tablet 16 g  4 Tablet Oral PRN    dextrose (D50W) injection syrg 12.5-25 g  25-50 mL IntraVENous PRN    glucagon (GLUCAGEN) injection 1 mg  1 mg IntraMUSCular PRN    insulin lispro (HUMALOG) injection   SubCUTAneous Q6H    pantoprazole (PROTONIX) 40 mg in 0.9% sodium chloride 10 mL injection  40 mg IntraVENous DAILY    alcohol 62% (NOZIN) nasal  1 Ampule  1 Ampule Topical Q12H    chlorhexidine (ORAL CARE KIT) 0.12 % mouthwash 15 mL  15 mL Oral Q12H    sodium chloride (NS) flush 5-40 mL  5-40 mL IntraVENous Q8H    sodium chloride (NS) flush 5-40 mL  5-40 mL IntraVENous PRN    acetaminophen (TYLENOL) suppository 650 mg  650 mg Rectal Q4H PRN    heparin (porcine) injection 5,000 Units  5,000 Units SubCUTAneous Q8H    NOREPINephrine (LEVOPHED) 8 mg in 5% dextrose 250mL (32 mcg/mL) infusion  0.5-30 mcg/min IntraVENous TITRATE    albumin human 5% (BUMINATE) 5 % solution        metroNIDAZOLE (FLAGYL) IVPB premix 500 mg  500 mg IntraVENous Q8H    balsam peru-castor oiL (VENELEX) ointment   Topical BID    propofol (DIPRIVAN) 10 mg/mL infusion  0-50 mcg/kg/min IntraVENous TITRATE    amiodarone (CORDARONE) 375 mg/250 mL D5W infusion  0.5 mg/min IntraVENous CONTINUOUS    fentaNYL (PF) 1,500 mcg/30 mL (50 mcg/mL) infusion  0-200 mcg/hr IntraVENous TITRATE    sodium chloride (NS) flush 5-40 mL  5-40 mL IntraVENous Q8H    sodium chloride (NS) flush 5-40 mL  5-40 mL IntraVENous PRN    polyethylene glycol (MIRALAX) packet 17 g  17 g Oral DAILY PRN       Review of Symptoms:  He       Objective:      Physical Exam:  Temp (24hrs), Av.2 °F (35.7 °C), Min:95 °F (35 °C), Max:98 °F (36.7 °C)    Patient Vitals for the past 8 hrs:   Pulse   08/03/21 1615 60   08/03/21 1600 60   08/03/21 1545 61   08/03/21 1530 60   08/03/21 1515 60   08/03/21 1500 61   08/03/21 1445 60   08/03/21 1430 60   08/03/21 1415 60   08/03/21 1400 61   08/03/21 1345 60   08/03/21 1330 60   08/03/21 1315 60   08/03/21 1300 60   08/03/21 1245 60   08/03/21 1230 60   08/03/21 1215 60   08/03/21 1200 60   08/03/21 1145 60   08/03/21 1130 60   08/03/21 1115 60   08/03/21 1100 60   08/03/21 1056 60   08/03/21 1045 60   08/03/21 1030 60   08/03/21 1015 60   08/03/21 1000 60   08/03/21 0945 60   08/03/21 0930 65   08/03/21 0915 64   08/03/21 0900 66   08/03/21 0845 (!) 59    Patient Vitals for the past 8 hrs:   Resp   08/03/21 1615 18   08/03/21 1600 20   08/03/21 1545 26   08/03/21 1530 22   08/03/21 1515 18   08/03/21 1500 20   08/03/21 1445 20   08/03/21 1430 26   08/03/21 1415 18   08/03/21 1400 18   08/03/21 1345 21   08/03/21 1330 18   08/03/21 1315 18   08/03/21 1300 18   08/03/21 1245 18   08/03/21 1230 18   08/03/21 1215 18   08/03/21 1200 19   08/03/21 1145 18   08/03/21 1130 18   08/03/21 1115 18   08/03/21 1100 18   08/03/21 1056 18   08/03/21 1045 14   08/03/21 1030 16   08/03/21 1015 18   08/03/21 1000 18   08/03/21 0945 18   08/03/21 0930 15   08/03/21 0915 17   08/03/21 0900 17   08/03/21 0845 11    Patient Vitals for the past 8 hrs:   BP   08/03/21 1615 107/62   08/03/21 1600 98/67   08/03/21 1545 (!) 79/55   08/03/21 1530 103/65   08/03/21 1515 (!) 86/58   08/03/21 1500 (!) 94/52   08/03/21 1445 103/64   08/03/21 1430 (!) 89/56   08/03/21 1415 132/69   08/03/21 1400 133/68   08/03/21 1345 137/66   08/03/21 1330 129/66   08/03/21 1315 127/64   08/03/21 1300 128/68   08/03/21 1245 129/63   08/03/21 1230 120/64   08/03/21 1215 122/60   08/03/21 1200 (!) 125/58   08/03/21 1145 120/60   08/03/21 1130 125/69   08/03/21 1115 121/63   08/03/21 1100 114/61   08/03/21 1045 111/64 08/03/21 1030 105/67   08/03/21 1015 (!) 89/52   08/03/21 1000 99/60   08/03/21 0945 (!) 128/56   08/03/21 0930 104/69   08/03/21 0915 111/69   08/03/21 0900 115/64   08/03/21 0845 (!) 89/50          Intake/Output Summary (Last 24 hours) at 8/3/2021 1630  Last data filed at 8/3/2021 1544  Gross per 24 hour   Intake 2891.85 ml   Output 555 ml   Net 2336.85 ml       Nondiaphoretic, not in acute distress, on the ventilator. No scleral icterus, mucous membranes moist, conjuctivae pink, no xanthelasma. Unlabored, clear to auscultation bilaterally anteriorly, symmetric air movement. Regular rate and rhythm. Palpable radial pulses bilaterally. Abdomen, soft, nontender, nondistended. Extremities without cyanosis or clubbing. Muscle tone and bulk normal.  Skin warm and dry. No rashes or ulcers. Neuro grossly nonfocal.  No tremor. Awake and appropriate. CARDIOGRAPHICS and STUDIES, I reviewed:    Telemetry:  A pacing. ECG:  A pacing with prolonged QTc. Labs:  Recent Labs     08/02/21 2155   TROIQ 0.43*     Lab Results   Component Value Date/Time    Cholesterol, total 143 07/06/2015 12:13 PM    HDL Cholesterol 28 (L) 07/06/2015 12:13 PM    LDL, calculated 72 07/06/2015 12:13 PM    Triglyceride 216 (H) 07/06/2015 12:13 PM     No results for input(s): INR, PTP, APTT, INREXT in the last 72 hours.    Recent Labs     08/03/21 0339 08/02/21 2155   * 138   K 3.2* 2.7*   CL 96* 97   CO2 25 21   BUN 34* 27*   CREA 1.87* 1.77*   * 250*   PHOS 5.0* 8.8*   CA 7.3* 8.4*   ALB 2.1* 1.8*   WBC 25.9* 42.8*   HGB 10.9* 10.8*   HCT 33.3* 34.8*    240     Recent Labs     08/03/21  0339 08/02/21  2155   AP 71 92   TP 5.7* 5.7*   ALB 2.1* 1.8*   GLOB 3.6 3.9     No components found for: [place]  Recent Labs     08/03/21  0405 08/02/21  2241   PH 7.45 7.28*   PCO2 36 42   PO2 112* 222*           Donte Mayer MD  8/3/2021

## 2021-08-03 NOTE — PROGRESS NOTES
Interdisciplinary team rounds were held 8/3/2021 with the following team members:Care Management, Nursing, Nutrition, Pharmacy, Physical Therapy, Physician and Respiratory Therapy. Plan of care discussed. Goal: See MD orders and progress notes for further  interventions and desired outcomes.

## 2021-08-03 NOTE — ED NOTES
Assumed care of pt from EMS. Per EMS, pt was unresponsive at the dinner table. EMS attached pt to pads and rhythm was Vfib, 7 shocks given as well as full doses of Epi and Amiodarone. EMS archived ROSC en route. Pt currently has irregular rhythm with PVC's with an ET tube being manually bagged. Pt on monitor x 3. Will continue to monitor.

## 2021-08-03 NOTE — PROCEDURES
EEG REPORT    Patient Name: Jerlean Gottron  : 1945  Age: 76 y.o. Ordering physician: Sadi Olivier  Date of EE/3/2021   8:14-8:37  Diagnosis: encephalopathy  Interpreting physician: Piedad Blount D.O. FAAN    Procedure: EEG    CLINICAL INDICATION: The patient is a 76 y.o. male who is being evaluated for baseline electro cerebral activities and to rule out seizure focus.       Current Facility-Administered Medications   Medication Dose Route Frequency    glucose chewable tablet 16 g  4 Tablet Oral PRN    dextrose (D50W) injection syrg 12.5-25 g  25-50 mL IntraVENous PRN    glucagon (GLUCAGEN) injection 1 mg  1 mg IntraMUSCular PRN    insulin lispro (HUMALOG) injection   SubCUTAneous Q6H    pantoprazole (PROTONIX) 40 mg in 0.9% sodium chloride 10 mL injection  40 mg IntraVENous DAILY    alcohol 62% (NOZIN) nasal  1 Ampule  1 Ampule Topical Q12H    chlorhexidine (ORAL CARE KIT) 0.12 % mouthwash 15 mL  15 mL Oral Q12H    sodium chloride (NS) flush 5-40 mL  5-40 mL IntraVENous Q8H    sodium chloride (NS) flush 5-40 mL  5-40 mL IntraVENous PRN    acetaminophen (TYLENOL) suppository 650 mg  650 mg Rectal Q4H PRN    heparin (porcine) injection 5,000 Units  5,000 Units SubCUTAneous Q8H    NOREPINephrine (LEVOPHED) 8 mg in 5% dextrose 250mL (32 mcg/mL) infusion  0.5-30 mcg/min IntraVENous TITRATE    albumin human 5% (BUMINATE) 5 % solution        metroNIDAZOLE (FLAGYL) IVPB premix 500 mg  500 mg IntraVENous Q8H    balsam peru-castor oiL (VENELEX) ointment   Topical BID    propofol (DIPRIVAN) 10 mg/mL infusion  0-50 mcg/kg/min IntraVENous TITRATE    amiodarone (CORDARONE) 375 mg/250 mL D5W infusion  0.5 mg/min IntraVENous CONTINUOUS    fentaNYL (PF) 1,500 mcg/30 mL (50 mcg/mL) infusion  0-200 mcg/hr IntraVENous TITRATE    sodium chloride (NS) flush 5-40 mL  5-40 mL IntraVENous Q8H    sodium chloride (NS) flush 5-40 mL  5-40 mL IntraVENous PRN    polyethylene glycol (MIRALAX) packet 17 g  17 g Oral DAILY PRN           DESCRIPTION OF PROCEDURE:     This is a digitally recorded electroencephalogram  Electrodes were applied in accordance with the international 10-20 system of electrode placement. 18 channels of scalp EEG are recorded  A channel was used for EoG  Another channel was used for ECG   The data is stored digitally and reviewed in reformatted montages for optimal display  EEG  was reviewed in both bipolar and referential montages    Description of Activity: The background of this recording contains no well-formed alpha activity seen. There was diffuse low amplitude delta activity identified throughout the study. Throughout the recording, there were no clear areas of focal slowing nor spike or spike-and-wave discharges seen. Hyperventilation was not performed. Photic stimulation produced no response in the posterior head regions. Clinical Interpretation: This EEG is abnormal. There is moderate generalized slowing. There is no focal asymmetry, seizures or epileptiform discharges seen. Clinical correlation is recommended          MP Rosenthal

## 2021-08-03 NOTE — PROGRESS NOTES
RT Note: vent mode changed to Assist Control before trip to CT. Currently: A/C 18, , Peep +5, FIO2 50%.  HR 84, RR 22-26, Sat's 100%

## 2021-08-03 NOTE — PROCEDURES
[facility]      Procedure Note - Central Venous Access:   Performed by Stephanie Salter NP    Obtained informed Consent. Immediately prior to the procedure, the patient was reevaluated and found suitable for the planned procedure and any planned medications. Immediately prior to the procedure a time out was called to verify the correct patient, procedure, equipment, staff, and marking as appropriate. Diagnosis: s/p cardiac arrest    Central line Bundle:  Full sterile barrier precautions used. 7-Step Sterility Protocol followed. (cap, mask sterile gown, sterile gloves, large sterile sheet, hand hygiene, 2% chlorhexidine for cutaneous antisepsis)  5 mL 1% Lidocaine placed at insertion site. Patient positioned in Trendelenburg?yes   The site was prepped with ChloraPrep. Using Seldinger technique a Triple Lumen CVC was placed in the Left, Internal Jugular Vein via direct cannulation with 1 number of attempts for IV Access and intravascular cooling. Ultrasound Guidance was utilized. There was good dark, non-pulsatile blood return in all ports. Femoral Site? no. If Yes, reason femoral site was chosen: n/a  Catheter secured. Biopatch in place? yes. Sterile Bio-occlusive dressing placed. The following complications were encountered: None. A follow-up chest x-ray was ordered post procedure. The procedure was tolerated well.       Stephanie Salter NP  Critical Care Medicine  [facility]

## 2021-08-03 NOTE — PROGRESS NOTES
[facility]    ICU TEAM Progress Note    Name: Tata Washington   : 1945   MRN: [id number]   Date: 8/3/2021      Subjective:     Being cooled  Amiodarone drip and propofol for sedation      Progress Note: 8/3/2021      75 y/o M with PMH of aortic stenosis s/p porcine AVR in , CAD, CAD s/p CABG x4, COPD, HFrEF 15-20%, pacemaker placement 10/2020 presents to [id number] [facility] after OSH V-tach arrest. ROSC achieved by EMS after shock x7, 450mg of amio, and Epi. Intubated by EMS with a 6.5 ETT. As per wife, pt has lost 70 pounds over the past few months. In the past 2 weeks he has developed a cough with sputum. He has not been vaccinated for COVID. Pt will be admitted to the ICU for post cardiac arrest care.       Active Problem List:     Problem List  Date Reviewed: 10/15/2020        Codes Class    Cardiac arrest [facility]) ICD-10-CM: I46.9  ICD-9-CM: 427.5         Iron deficiency anemia ICD-10-CM: D50.9  ICD-9-CM: 280.9         Bradycardia ICD-10-CM: R00.1  ICD-9-CM: 427.89         CHF (congestive heart failure), NYHA class IV ([facility] [address] ICD-10-CM: I50.9  ICD-9-CM: 428.0         Rapid atrial fibrillation (HCC) ICD-10-CM: I48.91  ICD-9-CM: 427.31         Atrial fibrillation with RVR (HCC) ICD-10-CM: I48.91  ICD-9-CM: 427.31         PAD (peripheral artery disease) ([facility]) ICD-10-CM: I73.9  ICD-9-CM: 443.9         Coronary artery disease due to lipid rich plaque ICD-10-CM: I25.10, I25.83  ICD-9-CM: 414.00, 414.3         S/P CABG (coronary artery bypass graft) ICD-10-CM: Z95.1  ICD-9-CM: V45.81         S/P AVR (aortic valve replacement) ICD-10-CM: Z95.2  ICD-9-CM: V43.3         Encounter for long-term (current) use of other medications ICD-10-CM: Z79.899  ICD-9-CM: V58.69         Pure hypercholesterolemia ICD-10-CM: E78.00  ICD-9-CM: 272.0         Essential hypertension, benign ICD-10-CM: I10  ICD-9-CM: 401.1         Coronary atherosclerosis of unspecified type of vessel, native or graft ICD-10-CM: I25.10  ICD-9-CM: 414.00               Past Medical History:      has a past medical history of Anemia, Aortal valvular stenosis, CAD (coronary artery disease), COPD, Hypercholesterolemia, Hypertension, and PAD (peripheral artery disease) ([facility] [address] Past Surgical History:      has a past surgical history that includes pr cabg, artery-vein, four; hx aortic valve replacement (10/3/07); and pr ins new/rplcmt prm pm w/transv eltrd atrial&vent (N/A, 10/16/2020). Home Medications:     Prior to Admission medications    Medication Sig Start Date End Date Taking? Authorizing Provider   FeroSuL 325 mg (65 mg iron) tablet TAKE 1 TABLET BY MOUTH EVERY DAY 21   Provider, Historical   methocarbamoL (ROBAXIN) 500 mg tablet TAKE 1 TABLET BY MOUTH THREE TIMES DAILY AS NEEDED 21   Provider, Historical   carvediloL (Coreg) 3.125 mg tablet Take 3.125 mg by mouth two (2) times daily (with meals). Provider, Historical   furosemide (LASIX) 40 mg tablet Take  by mouth daily. Provider, Historical   losartan (COZAAR) 25 mg tablet Take  by mouth daily. Provider, Historical   magnesium 250 mg tab Take  by mouth. Provider, Historical   multivitamin (ONE A DAY) tablet Take 1 Tab by mouth daily. Provider, Historical   ibuprofen (MOTRIN) 800 mg tablet Take 800 mg by mouth every eight (8) hours as needed. Provider, Historical   rivaroxaban (XARELTO) 20 mg tab tablet Take 1 Tab by mouth daily (with dinner). 10/11/20   Clair Petersen MD   atorvastatin (LIPITOR) 40 mg tablet Take 1 Tab by mouth nightly. 10/11/20   Clair Petersen MD   spironolactone (ALDACTONE) 25 mg tablet Take 1 Tab by mouth daily. 10/12/20   Clair Petersen MD   pantoprazole (PROTONIX) 40 mg tablet Take 40 mg by mouth daily.     Other, MD Abhi       Allergies/Social/Family History:     No Known Allergies   Social History     Tobacco Use    Smoking status: Former Smoker     Quit date: 2007     Years since quittin.8    Smokeless tobacco: Never Used   Substance Use Topics    Alcohol use: No      No family history on file. Review of Systems:     Pertinent items are noted in HPI. Objective:   Vital Signs:  Visit Vitals  /68   Pulse 60   Temp (!) 95.8 °F (35.4 °C)   Resp 18   Ht 5' 8\" (1.727 m)   Wt 90.2 kg (198 lb 13.7 oz)   SpO2 100%   BMI 30.24 kg/m²      O2 Device: Ventilator Temp (24hrs), Av.4 °F (35.8 °C), Min:95 °F (35 °C), Max:98 °F (36.7 °C)           Intake/Output:     Intake/Output Summary (Last 24 hours) at 8/3/2021 1407  Last data filed at 8/3/2021 1200  Gross per 24 hour   Intake 2891.85 ml   Output 350 ml   Net 2541. 85 ml       Physical Exam:    General:  sedated  Eye:  conjunctivae/corneas clear. PERRL,   Neurologic:  Eyes open when off sedation. Does not follow commands  Has intact brain stem reflexes  Lymphatic:  Cervical, supraclavicular, and axillary nodes normal.   Neck:  normal and no erythema or exudates noted. Lungs:  clear to auscultation bilaterally  Heart:  regular rate and rhythm, S1, S2 normal, no murmur, click, rub or gallop  Abdomen:  soft, non-tender. Bowel sounds normal. No masses,  no organomegaly  Cardiovascular:  Regular rate and rhythm, S1S2 present, without murmur or extra heart sounds, pedal pulses normal and no edema  Skin:  Normal.      LABS AND  DATA: Personally reviewed  Recent Labs     21   WBC 25.9* 42.8*   HGB 10.9* 10.8*   HCT 33.3* 34.8*    240     Recent Labs     21   * 138   K 3.2* 2.7*   CL 96* 97   CO2 25 21   BUN 34* 27*   CREA 1.87* 1.77*   * 250*   CA 7.3* 8.4*   MG 1.7 1.8   PHOS 5.0* 8.8*     Recent Labs     21   AP 71 92   TP 5.7* 5.7*   ALB 2.1* 1.8*   GLOB 3.6 3.9     No results for input(s): INR, PTP, APTT, INREXT, INREXT in the last 72 hours. No results for input(s): PHI, PCO2I, PO2I, FIO2I in the last 72 hours.   Recent Labs     21  2155   TROIQ 0.43* Ventilator Settings:  Mode Rate Tidal Volume Pressure FiO2 PEEP   Assist control   500 ml  10 cm H2O 30 % 5 cm H20     Peak airway pressure: 26 cm H2O    Minute ventilation: 9.19 l/min        MEDS: Reviewed    Chest X-Ray: personally reviewed and report checked    Echo 10/10/20  · LV: Estimated LVEF is 15 - 20%. Normal wall thickness. Moderately dilated left ventricle. Severely reduced systolic function. · LA: Mildly dilated left atrium. · RV: Moderately reduced systolic function. · AV: Prosthetic aortic valve. Aortic valve leaflet calcification present with reduced excursion. Moderate aortic valve stenosis is present. Mild aortic valve regurgitation is present. · MV: Moderate mitral annular calcification. Moderate to severe mitral valve regurgitation is present. · TV: Mild to moderate tricuspid valve regurgitation is present. · PV: Mild pulmonic valve regurgitation is present. · LA: No atrial septal defect present.     Assessment and Plan:   NOK: Doneta Needle (spouse)    Acute hypoxic respiratory failure, ventilator dependent  Vent Goals:   Chlorhexidine   Optimize PEEP/Ventilation/Oxygenation  Goal Tidal Volume 6 cc/kg based on IBW  Aim for lung protective ventilation  Head of bed > 30 degrees  Aggressive bronchopulmonary hygiene  DVT Prophylaxis (if no, list reason): Heparin   SPO2 Goal: > 92%  -rapid covid negative    AMS 2/2 cardiac arrest  -continue cooling  Bedside EEG today  Appreciate neurology evaluation    V-Tach with cardiac arrest 2/2 to hypokalemia and HFrEF +/- severe sepsis  -cont amiodarone gtt  -Goal K>4 and Mg>2  -BMP Q6   -hold off on IVF as pt already has s/o fluid overload  -TTE in am  -cardiology consult to interrogate pacemaker  -CTA chest negative for PE  -    GI : NPO  PPI    Renal : hypokalemia  replace electrolytes    ID : r/o c-diff colitis  Place patient on isolation  Send stool for c-diff  Start iv flagly  D/c zosyn and vancomycin    SUSHIL 2/2 cardiac arrest  -strict I/Os    Velazquez Catheter Present: Yes  GI Prophylaxis: Protonix (pantoprazole)   Nutrition: No   Bowel Movement: Yes    T/L/D  Tubes: ETT  Lines: Peripheral IV and Central Line  Drains: Velazquez Catheter    MDR's : yes  Code status : DNR. After the cooling process is completed patient's family does not want anything aggressive       DISPOSITION  Stay in ICU    CRITICAL CARE CONSULTANT NOTE  I had a face to face encounter with the patient, reviewed and interpreted patient data including clinical events, labs, images, vital signs, I/O's, and examined patient. I have discussed the case and the plan and management of the patient's care with the consulting services, the bedside nurses and the respiratory therapist.      NOTE OF PERSONAL INVOLVEMENT IN CARE   This patient has a high probability of imminent, clinically significant deterioration, which requires the highest level of preparedness to intervene urgently. I participated in the decision-making and personally managed or directed the management of the following life and organ supporting interventions that required my frequent assessment to treat or prevent imminent deterioration. I personally spent 60 minutes of critical care time. This is time spent at this critically ill patient's bedside actively involved in patient care as well as the coordination of care and discussions with the patient's family. This does not include any procedural time which has been billed separately.     [facility] Critical Care  8/3/2021

## 2021-08-03 NOTE — ED NOTES
Patient is being transferred to [facility], Room # 26 [id number]. Report given to Juliocesar Roach RN on Bobby Ha for routine progression of care. Report consisted of the following information SBAR, ED Summary and MAR. Patient transferred to receiving unit by: Radha La (RN or tech name). Outstanding consults needed: Yes    Next labs due: Yes    The following personal items will be sent with the patient during transfer to the floor:     All valuables:    Cardiac monitoring ordered: Yes    The following CURRENT information was reported to the receiving RN:    Code status: Full Code at time of transfer    Last set of vital signs:  Vital Signs  Level of Consciousness: (!) Responds to Pain (2) (08/02/21 2235)  Temp: (!) 96.6 °F (35.9 °C) (08/02/21 2235)  Temp Source: Axillary (08/02/21 2235)  Pulse (Heart Rate): 78 (08/02/21 2235)  Resp Rate: 26 (08/02/21 2235)  BP: 105/86 (08/02/21 2235)  MAP (Monitor): 89 (08/02/21 2235)  MAP (Calculated): 92 (08/02/21 2235)  BP 1 Location: Right upper arm (08/02/21 2148)  BP Patient Position: Lying (08/02/21 2148)  MEWS Score: 2 (08/02/21 2235)         Oxygen Therapy  O2 Sat (%): 100 % (08/02/21 2235)  Pulse via Oximetry: 73 beats per minute (08/02/21 2235)  O2 Device: Ventilator (08/02/21 2305)  FIO2 (%): 50 % (FIO2 lowered to 50%) (08/02/21 2305)      Last pain assessment:         Wounds: No     Urinary catheter: joe  Is there a joe order: Yes    LDAs:       Peripheral IV 08/02/21 Right Antecubital (Active)   Site Assessment Clean, dry, & intact 08/02/21 2152   Phlebitis Assessment 0 08/02/21 2152   Infiltration Assessment 0 08/02/21 2152       Peripheral IV 08/02/21 Left Antecubital (Active)   Site Assessment Clean, dry, & intact 08/02/21 2224   Phlebitis Assessment 0 08/02/21 2224   Infiltration Assessment 0 08/02/21 2224     Nasogastric Tube 08/03/21 (Active)     Airway - Endotracheal Tube Oral (Active)   Insertion Depth (cm) 24 cm 08/02/21 2226   Line Troy Teeth 08/02/21 2226   Site Assessment Clean, dry, & intact 08/02/21 2226       Opportunity for questions and clarification was provided.     Obey Sharma RN

## 2021-08-03 NOTE — CONSULTS
Palliative Medicine Consult  Memo: [phone] ([id number])    Patient Name: Tata Washington  YOB: 1945    Date of Initial Consult: 8/3/2021  Reason for Consult: Care Decisions  Requesting Provider: Dillon Madden MD  Primary Care Physician: Melecio Alpers, MD     SUMMARY:   Tata Washington is a 76 y.o. with a past history of aortic stenosis s/p porcine AVR in 2007, CAD, CAD s/o CABGx4, COPD, HFrEF 15-20%, and pacemaker placement in 2020, who was admitted on 8/2/2021 from home with a diagnosis of OSH V-tach arrest.  ROSC was obtained by EMS after shock x7, 450mg amio and intubation. Current medical issues leading to Palliative Medicine involvement include: goals of care in the setting of suspected anoxic injury s/p cardiac arrest    Social:  Mr Samanta Harrison is an active man- recently was in [place] visiting a good friend from childhood who lives there. He had some chronic health issues, but family shared that he was in moderately good health and this was an unexpected event    He has a blended family- he and his wife  35 years ago on 8/2- both had 4 children at the time. Since then they have both lost one of their children. He is originally from [facility], and recently took a trip back there in April to visit his siblings, as his brother has inoperable cancer     PALLIATIVE DIAGNOSES:   1. Goals of care  2. Cardiac arrest  3. Acute hypoxic respiratory failure  4. Obtunded  5. SUSHIL     PLAN:   1. Prior to meeting patient I did an extensive review of the chart and spoke with his nurse Aileen  2. Mr Parviz Jon is unresponsive, on the vent. Sedation was initially off, but restarted for high peak pressures from shivering  3. Hypothermia protocol was started, so prognosis cannot be determined until after re-warming process has been completed. Plans for EEG today  4. I met with patients daughter who was in the waiting room- her step mom (patients wife of 35 years) just left to go home and sleep.   She shared with me that they are clear that they do not want heroic measures to keep their dad in this state, but they are OK with waiting until the rewarming is completed as this will give time for the rest of her family to get to [place]. 5. She anticipates that they will need to have a family meeting- she and her mom are clear on their dads wishes, but she worries that the other siblings might have a much harder time understanding what is happening  6. She has my card and will call if they feel they need to meet- will be available for support  7. Initial consult note routed to primary continuity provider and/or primary health care team members  8. Communicated plan of care with: Palliative IDT, [facility] Team     GOALS OF CARE / TREATMENT PREFERENCES:     GOALS OF CARE:  Patient/Health Care Proxy Stated Goals: Other (comment) (no heroics, waiting out hypothermia protocol)    TREATMENT PREFERENCES:   Code Status: DNR    Advance Care Planning:  [x] [facility] Interdisciplinary Team has updated the ACP Navigator with Health Care Decision Maker and Patient Capacity      Primary Decision Maker: Marbin Escobedo - [phone]  Advance Care Planning 4/22/2021   Patient's Healthcare Decision Maker is: Legal Next of Kin   Primary Decision Maker Name -   Primary Decision Maker Phone Number -   Primary Decision Maker Relationship to Patient -   Confirm Advance Directive -   Patient Would Like to Complete Advance Directive -       Medical Interventions: Limited additional interventions     Other Instructions: Other:    As far as possible, the palliative care team has discussed with patient / health care proxy about goals of care / treatment preferences for patient.      HISTORY:     History obtained from: chart, daughter    CHIEF COMPLAINT: none    HPI/SUBJECTIVE:    The patient is:   [] Verbal and participatory  [x] Non-participatory due to:   condition    Clinical Pain Assessment (nonverbal scale for severity on nonverbal patients):   Clinical Pain Assessment  Severity: 0          Duration: for how long has pt been experiencing pain (e.g., 2 days, 1 month, years)  Frequency: how often pain is an issue (e.g., several times per day, once every few days, constant)     FUNCTIONAL ASSESSMENT:     Palliative Performance Scale (PPS):  PPS: 10       PSYCHOSOCIAL/SPIRITUAL SCREENING:     Palliative IDT has assessed this patient for cultural preferences / practices and a referral made as appropriate to needs (Cultural Services, Patient Advocacy, Ethics, etc.)    Any spiritual / Hinduism concerns:  [] Yes /  [x] No    Caregiver Burnout:  [] Yes /  [x] No /  [] No Caregiver Present      Anticipatory grief assessment:   [x] Normal  / [] Maladaptive       ESAS Anxiety: Anxiety: 0    ESAS Depression:          REVIEW OF SYSTEMS:     Positive and pertinent negative findings in ROS are noted above in HPI. The following systems were [x] reviewed / [] unable to be reviewed as noted in HPI  Other findings are noted below. Systems: constitutional, ears/nose/mouth/throat, respiratory, gastrointestinal, genitourinary, musculoskeletal, integumentary, neurologic, psychiatric, endocrine. Positive findings noted below. Modified ESAS Completed by: provider   Fatigue: 10       Pain: 0   Anxiety: 0 Nausea: 0     Dyspnea: 0                    PHYSICAL EXAM:     From RN flowsheet:  Wt Readings from Last 3 Encounters:   08/02/21 198 lb 13.7 oz (90.2 kg)   04/29/21 195 lb 6.4 oz (88.6 kg)   04/14/21 207 lb (93.9 kg)     Blood pressure 128/68, pulse 60, temperature (!) 95.8 °F (35.4 °C), resp. rate 18, height 5' 8\" (1.727 m), weight 198 lb 13.7 oz (90.2 kg), SpO2 100 %.     Pain Scale 1: Behavioral Pain Scale (BPS)                    Last bowel movement, if known:     Constitutional: unresponsive, on the vent  Skin: warm, dry         HISTORY:     Active Problems:    Cardiac arrest ([facility] [address] (8/2/2021)      Past Medical History: Diagnosis Date    Anemia     Aortal valvular stenosis     CAD (coronary artery disease)     COPD     Hypercholesterolemia     Hypertension     [facility]ipheral [facility] ([facility])       Past Surgical History:   Procedure Laterality Date    HX AORTIC VALVE REPLACEMENT  10/3/07    mosaic ultra porcine medtronic [id number]    [place] CABG, ARTERY-VEIN, FOUR      [place] INS NEW/RPLCMT PRM PM W/TRANSV ELTRD ATRIAL&VENT N/A 10/16/2020    INSERT PPM DUAL performed by Adry Christiansen MD at [address] CATH LAB      No family history on file. History reviewed, no pertinent family history.   Social History     Tobacco Use    Smoking status: Former Smoker     Quit date: 2007     Years since quittin.8    Smokeless tobacco: Never Used   Substance Use Topics    Alcohol use: No     No Known Allergies   Current Facility-Administered Medications   Medication Dose Route Frequency    glucose chewable tablet 16 g  4 Tablet Oral PRN    dextrose (D50W) injection syrg 12.5-25 g  25-50 mL IntraVENous PRN    glucagon (GLUCAGEN) injection 1 mg  1 mg IntraMUSCular PRN    insulin lispro (HUMALOG) injection   SubCUTAneous Q6H    pantoprazole (PROTONIX) 40 mg in 0.9% sodium chloride 10 mL injection  40 mg IntraVENous DAILY    alcohol 62% (NOZIN) nasal  1 Ampule  1 Ampule Topical Q12H    chlorhexidine (ORAL CARE KIT) 0.12 % mouthwash 15 mL  15 mL Oral Q12H    sodium chloride (NS) flush 5-40 mL  5-40 mL IntraVENous Q8H    sodium chloride (NS) flush 5-40 mL  5-40 mL IntraVENous PRN    acetaminophen (TYLENOL) suppository 650 mg  650 mg Rectal Q4H PRN    heparin (porcine) injection 5,000 Units  5,000 Units SubCUTAneous Q8H    NOREPINephrine (LEVOPHED) 8 mg in 5% dextrose 250mL (32 mcg/mL) infusion  0.5-30 mcg/min IntraVENous TITRATE    albumin human 5% (BUMINATE) 5 % solution        metroNIDAZOLE (FLAGYL) IVPB premix 500 mg  500 mg IntraVENous Q8H    balsam peru-castor oiL (VENELEX) ointment   Topical BID    propofol (DIPRIVAN) 10 mg/mL infusion  0-50 mcg/kg/min IntraVENous TITRATE    amiodarone (CORDARONE) 375 mg/250 mL D5W infusion  0.5 mg/min IntraVENous CONTINUOUS    fentaNYL (PF) 1,500 mcg/30 mL (50 mcg/mL) infusion  0-200 mcg/hr IntraVENous TITRATE    sodium chloride (NS) flush 5-40 mL  5-40 mL IntraVENous Q8H    sodium chloride (NS) flush 5-40 mL  5-40 mL IntraVENous PRN    polyethylene glycol (MIRALAX) packet 17 g  17 g Oral DAILY PRN          LAB AND IMAGING FINDINGS:     Lab Results   Component Value Date/Time    WBC 25.9 (H) 08/03/2021 03:39 AM    HGB 10.9 (L) 08/03/2021 03:39 AM    PLATELET 885 95/10/2528 03:39 AM     Lab Results   Component Value Date/Time    Sodium 133 (L) 08/03/2021 03:39 AM    Potassium 3.2 (L) 08/03/2021 03:39 AM    Chloride 96 (L) 08/03/2021 03:39 AM    CO2 25 08/03/2021 03:39 AM    BUN 34 (H) 08/03/2021 03:39 AM    Creatinine 1.87 (H) 08/03/2021 03:39 AM    Calcium 7.3 (L) 08/03/2021 03:39 AM    Magnesium 1.7 08/03/2021 03:39 AM    Phosphorus 5.0 (H) 08/03/2021 03:39 AM      Lab Results   Component Value Date/Time    Alk. phosphatase 71 08/03/2021 03:39 AM    Protein, total 5.7 (L) 08/03/2021 03:39 AM    Albumin 2.1 (L) 08/03/2021 03:39 AM    Globulin 3.6 08/03/2021 03:39 AM     No results found for: INR, PTMR, PTP, PT1, PT2, APTT, INREXT, INREXT   Lab Results   Component Value Date/Time    Iron 9 (L) 04/13/2021 11:44 AM    TIBC 382 04/13/2021 11:44 AM    Iron % saturation 2 (L) 04/13/2021 11:44 AM      Lab Results   Component Value Date/Time    pH 7.45 08/03/2021 04:05 AM    PCO2 36 08/03/2021 04:05 AM    PO2 112 (H) 08/03/2021 04:05 AM     No components found for: GLPOC   No results found for: CPK, CKMB             Total time:   Counseling / coordination time, spent as noted above:   > 50% counseling / coordination?:     Prolonged service was provided for  []30 min   []75 min in face to face time in the presence of the patient, spent as noted above.   Time Start:   Time End: Note: this can only be billed with 65100 (initial) or 29647 (follow up). If multiple start / stop times, list each separately.

## 2021-08-04 NOTE — PROGRESS NOTES
Palliative Medicine Consult  Memo: [phone] ([phone])    Patient Name: Santos Man  YOB: 1945    Date of Initial Consult: 8/3/2021  Reason for Consult: Care Decisions  Requesting Provider: Fabi Macias MD  Primary Care Physician: Marcelle Bourgeois MD     SUMMARY:   Santos Man is a 76 y.o. with a past history of aortic stenosis s/p porcine AVR in 2007, CAD, CAD s/o CABGx4, COPD, HFrEF 15-20%, and pacemaker placement in 2020, who was admitted on 8/2/2021 from home with a diagnosis of OSH V-tach arrest.  ROSC was obtained by EMS after shock x7, 450mg amio and intubation. Current medical issues leading to Palliative Medicine involvement include: goals of care in the setting of suspected anoxic injury s/p cardiac arrest    Social:  Mr Rayna Verduzco is an active man- recently was in [place] visiting a good friend from childhood who lives there. He had some chronic health issues, but family shared that he was in moderately good health and this was an unexpected event    He has a blended family- he and his wife  35 years ago on 8/2- both had 4 children at the time. Since then they have both lost one of their children. He is originally from [facility], and recently took a trip back there in April to visit his siblings, as his brother has inoperable cancer     PALLIATIVE DIAGNOSES:   1. Goals of care  2. Cardiac arrest  3. Acute hypoxic respiratory failure  4. Obtunded  5. SUSHIL     PLAN:   1. No family at bedside when I assessed Mr Juani Barton, but I know that family is coming in today  2. Will be available for support- daughter has my card and will call if her siblings need to sit down and talk about everything that is happening  3. Initial consult note routed to primary continuity provider and/or primary health care team members  4.  Communicated plan of care with: Palliative IDT, [facility] Team    Addendum:  Family would like CMO orders placed- not ready to extubate right now, but will be ready soon and will let nursing know. Orders placed in anticipation of, and nursing will initiate the process when family is ready. They did not want to have a family meeting, but I will remain available for support today if they need it     GOALS OF CARE / TREATMENT PREFERENCES:     GOALS OF CARE:  Patient/Health Care Proxy Stated Goals: Other (comment) (no heroics, waiting out hypothermia protocol)    TREATMENT PREFERENCES:   Code Status: DNR    Advance Care Planning:  [x] [facility] Interdisciplinary Team has updated the ACP Navigator with Health Care Decision Maker and Patient Capacity      Primary Decision Maker: Brandon Freeman - [phone]  Advance Care Planning 4/22/2021   Patient's Healthcare Decision Maker is: Legal Next of Kin   Primary Decision Maker Name -   Primary Decision Maker Phone Number -   Primary Decision Maker Relationship to Patient -   Confirm Advance Directive -   Patient Would Like to Complete Advance Directive -       Medical Interventions: Limited additional interventions     Other Instructions: Other:    As far as possible, the palliative care team has discussed with patient / health care proxy about goals of care / treatment preferences for patient.      HISTORY:     History obtained from: chart, daughter    CHIEF COMPLAINT: none    HPI/SUBJECTIVE:    The patient is:   [] Verbal and participatory  [x] Non-participatory due to:   condition    Clinical Pain Assessment (nonverbal scale for severity on nonverbal patients):   Clinical Pain Assessment  Severity: 0          Duration: for how long has pt been experiencing pain (e.g., 2 days, 1 month, years)  Frequency: how often pain is an issue (e.g., several times per day, once every few days, constant)     FUNCTIONAL ASSESSMENT:     Palliative Performance Scale (PPS):  PPS: 10       PSYCHOSOCIAL/SPIRITUAL SCREENING:     Palliative IDT has assessed this patient for cultural preferences / practices and a referral made as appropriate to needs (Cultural Services, Patient Advocacy, Ethics, etc.)    Any spiritual / Jainism concerns:  [] Yes /  [x] No    Caregiver Burnout:  [] Yes /  [x] No /  [] No Caregiver Present      Anticipatory grief assessment:   [x] Normal  / [] Maladaptive       ESAS Anxiety: Anxiety: 0    ESAS Depression:          REVIEW OF SYSTEMS:     Positive and pertinent negative findings in ROS are noted above in HPI. The following systems were [x] reviewed / [] unable to be reviewed as noted in HPI  Other findings are noted below. Systems: constitutional, ears/nose/mouth/throat, respiratory, gastrointestinal, genitourinary, musculoskeletal, integumentary, neurologic, psychiatric, endocrine. Positive findings noted below. Modified ESAS Completed by: provider   Fatigue: 10       Pain: 0   Anxiety: 0 Nausea: 0     Dyspnea: 0                    PHYSICAL EXAM:     From RN flowsheet:  Wt Readings from Last 3 Encounters:   08/02/21 198 lb 13.7 oz (90.2 kg)   04/29/21 195 lb 6.4 oz (88.6 kg)   04/14/21 207 lb (93.9 kg)     Blood pressure 92/61, pulse 80, temperature (!) 95.8 °F (35.4 °C), resp. rate 18, height 5' 8\" (1.727 m), weight 198 lb 13.7 oz (90.2 kg), SpO2 100 %.     Pain Scale 1: Behavioral Pain Scale (BPS)                    Last bowel movement, if known:     Constitutional: unresponsive, on the vent  Skin: warm, dry         HISTORY:     Active Problems:    Cardiac arrest ([facility] [address] (8/2/2021)      Goals of care, counseling/discussion ()      Acute respiratory failure with hypoxia (HCC) ()      Obtunded ()      SUSHIL (acute kidney injury) ([facility] [address] ()      Past Medical History:   Diagnosis Date    Anemia     Aortal valvular stenosis     CAD (coronary artery disease)     COPD     Hypercholesterolemia     Hypertension     PAD (peripheral artery disease) (HCC)       Past Surgical History:   Procedure Laterality Date    HX AORTIC VALVE REPLACEMENT  10/3/07    mosaic ultra porcine medtronic [id number]    [place] CABG, ARTERY-VEIN, FOUR      [place] INS NEW/RPLCMT PRM PM W/TRANSV ELTRD ATRIAL&VENT N/A 10/16/2020    INSERT PPM DUAL performed by Noni Mariee MD at [facility]      No family history on file. History reviewed, no pertinent family history.   Social History     Tobacco Use    Smoking status: Former Smoker     Quit date: 2007     Years since quittin.8    Smokeless tobacco: Never Used   Substance Use Topics    Alcohol use: No     No Known Allergies   Current Facility-Administered Medications   Medication Dose Route Frequency    glucose chewable tablet 16 g  4 Tablet Oral PRN    dextrose (D50W) injection syrg 12.5-25 g  25-50 mL IntraVENous PRN    glucagon (GLUCAGEN) injection 1 mg  1 mg IntraMUSCular PRN    insulin lispro (HUMALOG) injection   SubCUTAneous Q6H    pantoprazole (PROTONIX) 40 mg in 0.9% sodium chloride 10 mL injection  40 mg IntraVENous DAILY    alcohol 62% (NOZIN) nasal  1 Ampule  1 Ampule Topical Q12H    chlorhexidine (ORAL CARE KIT) 0.12 % mouthwash 15 mL  15 mL Oral Q12H    sodium chloride (NS) flush 5-40 mL  5-40 mL IntraVENous Q8H    sodium chloride (NS) flush 5-40 mL  5-40 mL IntraVENous PRN    acetaminophen (TYLENOL) suppository 650 mg  650 mg Rectal Q4H PRN    heparin (porcine) injection 5,000 Units  5,000 Units SubCUTAneous Q8H    NOREPINephrine (LEVOPHED) 8 mg in 5% dextrose 250mL (32 mcg/mL) infusion  0.5-30 mcg/min IntraVENous TITRATE    metroNIDAZOLE (FLAGYL) IVPB premix 500 mg  500 mg IntraVENous Q8H    balsam peru-castor oiL (VENELEX) ointment   Topical BID    propofol (DIPRIVAN) 10 mg/mL infusion  0-50 mcg/kg/min IntraVENous TITRATE    amiodarone (CORDARONE) 375 mg/250 mL D5W infusion  0.5 mg/min IntraVENous CONTINUOUS    fentaNYL (PF) 1,500 mcg/30 mL (50 mcg/mL) infusion  0-200 mcg/hr IntraVENous TITRATE    sodium chloride (NS) flush 5-40 mL  5-40 mL IntraVENous Q8H    sodium chloride (NS) flush 5-40 mL  5-40 mL IntraVENous PRN    polyethylene glycol (MIRALAX) packet 17 g  17 g Oral DAILY PRN          LAB AND IMAGING FINDINGS:     Lab Results   Component Value Date/Time    WBC 17.8 (H) 08/04/2021 02:32 AM    HGB 11.3 (L) 08/04/2021 02:32 AM    PLATELET 529 82/55/1138 02:32 AM     Lab Results   Component Value Date/Time    Sodium 136 08/04/2021 02:32 AM    Potassium 2.6 (LL) 08/04/2021 02:32 AM    Chloride 101 08/04/2021 02:32 AM    CO2 25 08/04/2021 02:32 AM    BUN 39 (H) 08/04/2021 02:32 AM    Creatinine 2.17 (H) 08/04/2021 02:32 AM    Calcium 8.3 (L) 08/04/2021 02:32 AM    Magnesium 2.6 (H) 08/04/2021 02:32 AM    Phosphorus 3.9 08/04/2021 02:32 AM      Lab Results   Component Value Date/Time    Alk. phosphatase 71 08/03/2021 03:39 AM    Protein, total 5.7 (L) 08/03/2021 03:39 AM    Albumin 2.1 (L) 08/03/2021 03:39 AM    Globulin 3.6 08/03/2021 03:39 AM     Lab Results   Component Value Date/Time    INR 1.1 08/04/2021 02:32 AM    Prothrombin time 11.3 (H) 08/04/2021 02:32 AM    aPTT 28.3 08/04/2021 02:32 AM      Lab Results   Component Value Date/Time    Iron 9 (L) 04/13/2021 11:44 AM    TIBC 382 04/13/2021 11:44 AM    Iron % saturation 2 (L) 04/13/2021 11:44 AM      Lab Results   Component Value Date/Time    pH 7.45 08/03/2021 04:05 AM    PCO2 36 08/03/2021 04:05 AM    PO2 112 (H) 08/03/2021 04:05 AM     No components found for: GLPOC   No results found for: CPK, CKMB             Total time:   Counseling / coordination time, spent as noted above:   > 50% counseling / coordination?:     Prolonged service was provided for  []30 min   []75 min in face to face time in the presence of the patient, spent as noted above. Time Start:   Time End:   Note: this can only be billed with 60453 (initial) or 59590 (follow up). If multiple start / stop times, list each separately.

## 2021-08-04 NOTE — PROGRESS NOTES
Bedside and Verbal shift change report given to PRITI Willingham (oncoming nurse) by Polly Marquez RN (offgoing nurse). Report included the following information SBAR, Kardex, Intake/Output, MAR, Recent Results and Cardiac Rhythm AV Paced. 2000: Assessment complete. See flowsheet. 0013: Levophed titrated off. Assessment unchanged. 0340: Patient bathed, linens changed. Assessment unchanged. 46: Notified Mary Nunez NP of K 2.6. Patient has central line and NGT (to suction with dark brown drainage.) Orders received to give 20meq KCL IV q1h x 3 doses. 0: Notified Mary Nunez NP of K replacement infusing. However, orders to hold K replacement 4 hours prior to re-warming. Orders received to hold K replacement and start rewarming in 4 hours. 1870: Patient's daughter at bedside. 0710: Bedside and Verbal shift change report given to Stella Gonzales RN (oncoming nurse) by PRITI Pizarro RN (offgoing nurse). Report included the following information SBAR, Kardex, Intake/Output, MAR, Recent Results and Cardiac Rhythm Paced.

## 2021-08-04 NOTE — PROGRESS NOTES
Transition of Care Plan:    RUR:  19%  Disposition:  Comfort care  Follow up appointments:  DME needed:  Transportation at Discharge:  [address] [place] [address] Avenue or means to access home:         Medicare Letter:  Is patient a BCPI-A Bundle: If yes, was Bundle Letter given?:     Caregiver Contact:  Discharge Caregiver contacted prior to discharge? Pt is a 75 yo male who experienced a cardiac arrest at his home, was resuscitated by EMS and has been on vent support in CCU. Pt has remained unresponsive on no sedation and after conferring with medical staff and extensively within the family, they have made the decision for compassionate extubation at some point today. CM will remain avbl to assist as needed. Care Management Interventions  PCP Verified by CM: Yes  Palliative Care Criteria Met (RRAT>21 & CHF Dx)?: Yes  Palliative Consult Recommended?: Yes  Mode of Transport at Discharge:  Other (see comment)  Transition of Care Consult (CM Consult): Discharge Planning  Discharge Durable Medical Equipment: No  Physical Therapy Consult: No  Occupational Therapy Consult: No  Speech Therapy Consult: No  Current Support Network: Lives with Spouse  Discharge Location  Discharge Placement: [address] [place] [address] [place] [facility]

## 2021-08-04 NOTE — PROGRESS NOTES
0700: recvd report and assumed care of pt. Pt daughter at bedside. 0800: multiple family members at bedside    0900: rewarming process started    1000: pt reached normal temp with zoll system. 1015: propofol off    1045:  at bedside to provide last rights    1100: IDR discussed plan for w/d care today    1145: pt would like to start w/d process now. Palliative/comfort orders placed. Family would like to take a quick break prior to extubation. 1245: pt extubated after being adequately medicated. Pt daughter remains at bedside. 1400: continue to routinely medicate pt as needed for comfort. Family has been present and at the bedside. 1545: called [facility], no one picked up. Had to leave voicemail. [facility] [address] [address] called back. I advised them of time of death. Patient is not a candidate for donation of organs or tissue.     1700: waiting for family to arrive from airport    1800: Sania Hyde [facility] called, pt not candidate

## 2021-08-04 NOTE — DEATH NOTE
Death Declaration         [facility] CRITICAL CARE    Pt Name  Lolita Martin   Admit date:  8/2/2021   Date and time of death:  08/04/2021 @ 36   Room Number  2518/01    Medical Record Number  [id number] @ [facility]   Age  76 y.o. Date of Birth 1945   PCP Siomara Spann MD   Attending physician Omi Zhao MD      Code Status  DNR    Patient seen and examined     Mental status   Unresponsive    Pupils Dilated and Fixed    Respiration Nil    Pulse  Absent     Heart Sounds  Absent        Family   bedside   [facility]  Notified by Nursing staff     Death certificate and discharge summary completion remain  Dr. Omi Zhao MD's responsibility.                                  8/4/2021

## 2021-08-04 NOTE — PROGRESS NOTES
Per MD Nicko Aldrich, ok to contact EP lab for assistance in turning pt pacemaker off. Pt now on comfort care.  EP lab stated they will call rep to assist.

## 2021-08-04 NOTE — PROGRESS NOTES
Neurology Note    Patient ID:  Shantel Almeida  [id number]  76 y.o.  1945      Date of Consultation:  August 4, 2021    Assessment and Plan:    The patient is a pleasant 27-year-old gentleman who was admitted after undergoing cardiac arrest.  He was placed on hypothermia protocol. His neurological examination does reveal intact brainstem reflexes and no spontaneous movement       Cardiac arrest with concern for anoxic brain injury:      EEG revealed no evidence of nonconvulsive seizures, but significant slowing. There has been no change in her neurological examination at this time. After discussion with the family, they are considering comfort care measures. If there are additional questions or concerns that arise from a neurological standpoint, please do not hesitate to reach out and I will come by and speak to family or reassess patient. Subjective: no verbal output. History of Present Illness:   Shantel Almeida is a 76 y.o. male with a history of aortic valve replacement, coronary artery disease, bypass surgery, COPD, pacemaker implantation who presented to [facility] on 8/3/2021 after having a out of hospital V. tach arrest.  The patient did receive amiodarone, epinephrine and shocked x7 with return of circulation. Hypothermia is continuing. I spoke with nursing and family at the bedside this a.m. and they are considering comfort care later today.       Past Medical History:   Diagnosis Date    Anemia     Aortal valvular stenosis     CAD (coronary artery disease)     COPD     Hypercholesterolemia     Hypertension     [facility]peripheral [facility])         Past Surgical History:   Procedure Laterality Date    HX AORTIC VALVE REPLACEMENT  10/3/07    mosaic ultra porcine medtronic [id number]    [place] CABG, ARTERY-VEIN, FOUR      [place] INS NEW/RPLCMT PRM PM W/TRANSV ELTRD ATRIAL&VENT N/A 10/16/2020    INSERT PPM DUAL performed by Pau Kemp MD at [facility] CARDIAC CATH LAB        No family history on file.      Social History     Tobacco Use    Smoking status: Former Smoker     Quit date: 2007     Years since quittin.8    Smokeless tobacco: Never Used   Substance Use Topics    Alcohol use: No        No Known Allergies       Current Facility-Administered Medications   Medication Dose Route Frequency    glucose chewable tablet 16 g  4 Tablet Oral PRN    dextrose (D50W) injection syrg 12.5-25 g  25-50 mL IntraVENous PRN    glucagon (GLUCAGEN) injection 1 mg  1 mg IntraMUSCular PRN    insulin lispro (HUMALOG) injection   SubCUTAneous Q6H    pantoprazole (PROTONIX) 40 mg in 0.9% sodium chloride 10 mL injection  40 mg IntraVENous DAILY    alcohol 62% (NOZIN) nasal  1 Ampule  1 Ampule Topical Q12H    chlorhexidine (ORAL CARE KIT) 0.12 % mouthwash 15 mL  15 mL Oral Q12H    sodium chloride (NS) flush 5-40 mL  5-40 mL IntraVENous Q8H    sodium chloride (NS) flush 5-40 mL  5-40 mL IntraVENous PRN    acetaminophen (TYLENOL) suppository 650 mg  650 mg Rectal Q4H PRN    heparin (porcine) injection 5,000 Units  5,000 Units SubCUTAneous Q8H    NOREPINephrine (LEVOPHED) 8 mg in 5% dextrose 250mL (32 mcg/mL) infusion  0.5-30 mcg/min IntraVENous TITRATE    metroNIDAZOLE (FLAGYL) IVPB premix 500 mg  500 mg IntraVENous Q8H    balsam peru-castor oiL (VENELEX) ointment   Topical BID    propofol (DIPRIVAN) 10 mg/mL infusion  0-50 mcg/kg/min IntraVENous TITRATE    amiodarone (CORDARONE) 375 mg/250 mL D5W infusion  0.5 mg/min IntraVENous CONTINUOUS    fentaNYL (PF) 1,500 mcg/30 mL (50 mcg/mL) infusion  0-200 mcg/hr IntraVENous TITRATE    sodium chloride (NS) flush 5-40 mL  5-40 mL IntraVENous Q8H    sodium chloride (NS) flush 5-40 mL  5-40 mL IntraVENous PRN    polyethylene glycol (MIRALAX) packet 17 g  17 g Oral DAILY PRN       Review of Systems:    [x]Unable to obtain  ROS due to  []mental status change  []sedated   [x]intubated    Objective: Visit Vitals  BP 92/61   Pulse 80   Temp (!) 95.8 °F (35.4 °C)   Resp 18   Ht 5' 8\" (1.727 m)   Wt 198 lb 13.7 oz (90.2 kg)   SpO2 100%   BMI 30.24 kg/m²       Physical examination:    Neurological examination    Neck: no carotid bruits  Lungs: clear to auscultation  Heart:  no murmurs, regular rate  Lower extremity: no edema    Language: coma    Cranial nerves:   Perrrla  Corneal reflex intact  Oculocephalic reflex intact      Motor: Tone normal  No evidence of fasciculations  No spontaneous limb movement      Reflexes:  Diminished throughout      Cerebellar testing:  no abnormal movements      Labs:     Lab Results   Component Value Date/Time    Hemoglobin A1c 5.4 08/03/2021 01:00 AM    Sodium 136 08/04/2021 02:32 AM    Potassium 2.6 (LL) 08/04/2021 02:32 AM    Chloride 101 08/04/2021 02:32 AM    Glucose 105 (H) 08/04/2021 02:32 AM    BUN 39 (H) 08/04/2021 02:32 AM    Creatinine 2.17 (H) 08/04/2021 02:32 AM    Calcium 8.3 (L) 08/04/2021 02:32 AM    WBC 17.8 (H) 08/04/2021 02:32 AM    HCT 33.3 (L) 08/04/2021 02:32 AM    HGB 11.3 (L) 08/04/2021 02:32 AM    PLATELET 194 09/41/0922 02:32 AM       Imaging:    No results found for this or any previous visit. Results from [place] encounter on 08/02/21    CT ABD PELV W CONT    Narrative  EXAM: CT ABD PELV W CONT    INDICATION: profuse diarrhea, lacate of 14, evaluate for colitis    COMPARISON: CTA thorax of earlier today. CONTRAST: 100 mL of Isovue-370. TECHNIQUE:  Following the uneventful intravenous administration of contrast, thin axial  images were obtained through the abdomen and pelvis. Coronal and sagittal  reconstructions were generated. Oral contrast was not administered. CT dose  reduction was achieved through use of a standardized protocol tailored for this  examination and automatic exposure control for dose modulation. FINDINGS:  LOWER THORAX: Posterior atelectasis.  Coronary artery calcifications and  postsurgical changes of CABG.  LIVER: Small right lobe cyst. Reflux of contrast into hepatic veins. No focal  lesion otherwise. BILIARY TREE: Gallbladder contains intraluminal stones and radiodense material  but otherwise is unremarkable. CBD is not dilated. SPLEEN: Calcified granuloma. Otherwise unremarkable. PANCREAS: No mass or ductal dilatation. ADRENALS: Unremarkable. KIDNEYS: No mass, calculus, or hydronephrosis. STOMACH: Enteric tube traverses into the gastric lumen. Otherwise unremarkable. SMALL BOWEL: No dilatation or wall thickening. COLON: Mild inflammatory stranding around the hepatic flexure. Colon shows  diffuse air-fluid levels. No dilation or wall thickening. Noninflamed appearing  sigmoid colon diverticula. APPENDIX: Unremarkable. PERITONEUM: No ascites or pneumoperitoneum. RETROPERITONEUM: Atherosclerotic calcification without aneurysm or dissection. Celiac axis, SMA, and ISIDRO opacify normally with calcific and soft plaque but no  clear stenosis or aneurysm. No enlarged lymphadenopathy. REPRODUCTIVE ORGANS: Prostate and seminal vesicles appear unremarkable. URINARY BLADDER: Collapsed around Velazquez catheter balloon and opacified with  contrast. No evident mass. BONES: Degenerative spine change. No fracture or aggressive lesion. ABDOMINAL WALL: No mass or hernia. ADDITIONAL COMMENTS: N/A    Impression  Possible mild segmental colitis of the hepatic flexure with  differential inclusive of infectious and inflammatory etiologies and less likely  ischemic. There are associated air-fluid levels in colon compatible with history  of diarrhea. No acute findings otherwise with incidentals as above.     head CT from August 2, 2021. There was no acute abnormality. There was mild atrophy and chronic small vessel ischemic changes.            Active Problems:    Cardiac arrest ([facility] [address] (8/2/2021)      Goals of care, counseling/discussion ()      Acute respiratory failure with hypoxia (HCC) ()      Obtunded ()      SUSHIL (acute kidney injury) ([facility] [address] ()        I spent   35  minutes providing care to this  acutely ill inpatient with > 50% of the time counseling and assisting in the coordination of care of the patient on the patient's hospital floor/unit. I spoke with family and nursing at the bedside today.                    Signed By:  Wendy Romero DO FAAN    August 4, 2021

## 2021-08-04 NOTE — PROGRESS NOTES
[facility]    ICU TEAM Progress Note    Name: Carla Peck   : 1945   MRN: [id number]   Date: 2021      Subjective:      - rewarming this a.m. Family has decided to transition to comfort care. Progress Note: 2021      75 y/o M with PMH of aortic stenosis s/p porcine AVR in , CAD, CAD s/p CABG x4, COPD, HFrEF 15-20%, pacemaker placement 10/2020 presents to [facility] after OSH V-tach arrest. ROSC achieved by EMS after shock x7, 450mg of amio, and Epi. Intubated by EMS with a 6.5 ETT. As per wife, pt has lost 70 pounds over the past few months. In the past 2 weeks he has developed a cough with sputum. He has not been vaccinated for COVID. Active Problem List:     Problem List  Date Reviewed: 10/15/2020        Codes Class    Goals of care, counseling/discussion ICD-10-CM: Z71.89  ICD-9-CM: V65.49         Acute respiratory failure with hypoxia (HCC) ICD-10-CM: J96.01  ICD-9-CM: 518.81         Obtunded ICD-10-CM: R40.1  ICD-9-CM: 780.09         USSHIL (acute kidney injury) ([facility] [address] ICD-10-CM: N17.9  ICD-9-CM: 770. 9         Cardiac arrest ([facility] [address] ICD-10-CM: I46.9  ICD-9-CM: 427.5         Iron deficiency anemia ICD-10-CM: D50.9  ICD-9-CM: 280.9         Bradycardia ICD-10-CM: R00.1  ICD-9-CM: 427.89         CHF (congestive heart failure), NYHA class IV (HCC) ICD-10-CM: I50.9  ICD-9-CM: 428.0         Rapid atrial fibrillation (HCC) ICD-10-CM: I48.91  ICD-9-CM: 427.31         Atrial fibrillation with RVR (HCC) ICD-10-CM: I48.91  ICD-9-CM: 427.31         PAD (peripheral artery disease) (HCC) ICD-10-CM: I73.9  ICD-9-CM: 443.9         Coronary artery disease due to lipid rich plaque ICD-10-CM: I25.10, I25.83  ICD-9-CM: 414.00, 414.3         S/P CABG (coronary artery bypass graft) ICD-10-CM: Z95.1  ICD-9-CM: V45.81         S/P AVR (aortic valve replacement) ICD-10-CM: Z95.2  ICD-9-CM: V43.3         Encounter for long-term (current) use of other medications ICD-10-CM: G59.017  ICD-9-CM: V58.69         Pure hypercholesterolemia ICD-10-CM: E78.00  ICD-9-CM: 272.0         Essential hypertension, benign ICD-10-CM: I10  ICD-9-CM: 401.1         Coronary atherosclerosis of unspecified type of vessel, native or graft ICD-10-CM: I25.10  ICD-9-CM: 414.00               Past Medical History:      has a past medical history of Anemia, Aortal valvular stenosis, CAD (coronary artery disease), COPD, Hypercholesterolemia, Hypertension, and PAD (peripheral artery disease) ([facility] [address] Past Surgical History:      has a past surgical history that includes pr cabg, artery-vein, four; hx aortic valve replacement (10/3/07); and pr ins new/rplcmt prm pm w/transv eltrd atrial&vent (N/A, 10/16/2020). Home Medications:     Prior to Admission medications    Medication Sig Start Date End Date Taking? Authorizing Provider   FeroSuL 325 mg (65 mg iron) tablet TAKE 1 TABLET BY MOUTH EVERY DAY 7/23/21   Provider, Historical   methocarbamoL (ROBAXIN) 500 mg tablet TAKE 1 TABLET BY MOUTH THREE TIMES DAILY AS NEEDED 7/5/21   Provider, Historical   carvediloL (Coreg) 3.125 mg tablet Take 3.125 mg by mouth two (2) times daily (with meals). Provider, Historical   furosemide (LASIX) 40 mg tablet Take  by mouth daily. Provider, Historical   losartan (COZAAR) 25 mg tablet Take  by mouth daily. Provider, Historical   magnesium 250 mg tab Take  by mouth. Provider, Historical   multivitamin (ONE A DAY) tablet Take 1 Tab by mouth daily. Provider, Historical   ibuprofen (MOTRIN) 800 mg tablet Take 800 mg by mouth every eight (8) hours as needed. Provider, Historical   rivaroxaban (XARELTO) 20 mg tab tablet Take 1 Tab by mouth daily (with dinner). 10/11/20   Rudi Evangelista MD   atorvastatin (LIPITOR) 40 mg tablet Take 1 Tab by mouth nightly. 10/11/20   Rudi Evangelista MD   spironolactone (ALDACTONE) 25 mg tablet Take 1 Tab by mouth daily.  10/12/20   Rudi Evangelista MD   pantoprazole (PROTONIX) 40 mg tablet Take 40 mg by mouth daily. Other, MD Abhi       Allergies/Social/Family History:     No Known Allergies   Social History     Tobacco Use    Smoking status: Former Smoker     Quit date: 2007     Years since quittin.8    Smokeless tobacco: Never Used   Substance Use Topics    Alcohol use: No      No family history on file. Review of Systems:     Pertinent items are noted in HPI. Objective:   Vital Signs:  Visit Vitals  BP 92/61   Pulse 80   Temp (!) 95.8 °F (35.4 °C)   Resp 18   Ht 5' 8\" (1.727 m)   Wt 90.2 kg (198 lb 13.7 oz)   SpO2 100%   BMI 30.24 kg/m²      O2 Device: Ventilator, Endotracheal tube Temp (24hrs), Av.7 °F (35.4 °C), Min:95 °F (35 °C), Max:96.2 °F (35.7 °C)           Intake/Output:     Intake/Output Summary (Last 24 hours) at 2021 0900  Last data filed at 2021 0545  Gross per 24 hour   Intake 986.25 ml   Output 1875 ml   Net -888.75 ml       Physical Exam:    General:  sedated  Eye:  conjunctivae/corneas clear. PERRL,   Neurologic:  Eyes open when off sedation. Does not follow commands  Has intact brain stem reflexes  Lymphatic:  Cervical, supraclavicular, and axillary nodes normal.   Neck:  normal and no erythema or exudates noted. Lungs:  clear to auscultation bilaterally  Heart:  regular rate and rhythm, S1, S2 normal, no murmur, click, rub or gallop  Abdomen:  soft, non-tender.  Bowel sounds normal. No masses,  no organomegaly  Cardiovascular:  Regular rate and rhythm, S1S2 present, without murmur or extra heart sounds, pedal pulses normal and no edema  Skin:  Normal.      LABS AND  DATA: Personally reviewed  Recent Labs     21   WBC 17.8* 25.9*   HGB 11.3* 10.9*   HCT 33.3* 33.3*    193     Recent Labs     21    133*   K 2.6* 3.2*    96*   CO2 25 25   BUN 39* 34*   CREA 2.17* 1.87*   * 265*   CA 8.3* 7.3*   MG 2.6* 1.7   PHOS 3.9 5.0*     Recent Labs 08/03/21  0339 08/02/21  2155   AP 71 92   TP 5.7* 5.7*   ALB 2.1* 1.8*   GLOB 3.6 3.9     Recent Labs     08/04/21  0232   INR 1.1   PTP 11.3*   APTT 28.3      No results for input(s): PHI, PCO2I, PO2I, FIO2I in the last 72 hours. Recent Labs     08/02/21  2155   TROIQ 0.43*         Ventilator Settings:  Mode Rate Tidal Volume Pressure FiO2 PEEP   Assist control, Volume control   500 ml  10 cm H2O 30 % 5 cm H20     Peak airway pressure: 25 cm H2O    Minute ventilation: 9.29 l/min        MEDS: Reviewed    Chest X-Ray: personally reviewed and report checked    Echo 10/10/20  · LV: Estimated LVEF is 15 - 20%. Normal wall thickness. Moderately dilated left ventricle. Severely reduced systolic function. · LA: Mildly dilated left atrium. · RV: Moderately reduced systolic function. · AV: Prosthetic aortic valve. Aortic valve leaflet calcification present with reduced excursion. Moderate aortic valve stenosis is present. Mild aortic valve regurgitation is present. · MV: Moderate mitral annular calcification. Moderate to severe mitral valve regurgitation is present. · TV: Mild to moderate tricuspid valve regurgitation is present. · PV: Mild pulmonic valve regurgitation is present. · LA: No atrial septal defect present.     Assessment and Plan:   NOK: Harrie Jeans (spouse)    Acute hypoxic respiratory failure, ventilator dependent  Vent Goals:   Chlorhexidine   Optimize PEEP/Ventilation/Oxygenation  Goal Tidal Volume 6 cc/kg based on IBW  Aim for lung protective ventilation  Head of bed > 30 degrees  Aggressive bronchopulmonary hygiene  DVT Prophylaxis (if no, list reason): Heparin   SPO2 Goal: > 92%  -rapid covid negative    AMS 2/2 cardiac arrest  -continue cooling  Bedside EEG today  Appreciate neurology evaluation    V-Tach with cardiac arrest 2/2 to hypokalemia and HFrEF +/- severe sepsis  -cont amiodarone gtt  -Goal K>4 and Mg>2  -BMP Q6   -hold off on IVF as pt already has s/o fluid overload  -TTE in am  -cardiology consult to interrogate pacemaker  -CTA chest negative for PE  -    GI : NPO  PPI    Renal : hypokalemia  replace electrolytes    ID : r/o c-diff colitis  Place patient on isolation  Send stool for c-diff  Start iv flagly  D/c zosyn and vancomycin    SUSHIL 2/2 cardiac arrest  -strict I/Os    Velazquez Catheter Present: Yes  GI Prophylaxis: Protonix (pantoprazole)   Nutrition: No   Bowel Movement: Yes    T/L/D  Tubes: ETT  Lines: Peripheral IV and Central Line  Drains: Velazquez Catheter    MDR's : yes  Code status : DNR. After the cooling process is completed patient's family does not want anything aggressive. Plan to rewarm today. They have indicated that they are likely to transition to comfort care after they have had time for the extended family to visit       DISPOSITION  Stay in ICU    CRITICAL CARE CONSULTANT NOTE  I had a face to face encounter with the patient, reviewed and interpreted patient data including clinical events, labs, images, vital signs, I/O's, and examined patient. I have discussed the case and the plan and management of the patient's care with the consulting services, the bedside nurses and the respiratory therapist.      NOTE OF PERSONAL INVOLVEMENT IN CARE   This patient has a high probability of imminent, clinically significant deterioration, which requires the highest level of preparedness to intervene urgently. I participated in the decision-making and personally managed or directed the management of the following life and organ supporting interventions that required my frequent assessment to treat or prevent imminent deterioration. I personally spent 30 minutes of critical care time. This is time spent at this critically ill patient's bedside actively involved in patient care as well as the coordination of care and discussions with the patient's family. This does not include any procedural time which has been billed separately.   Maame Arango MD [facility]  [phone]    8/4/2021

## 2021-08-04 NOTE — PROGRESS NOTES
Interdisciplinary team rounds were held 8/4/2021  with the following team members:Care Management, Diabetes Treatment Specialist, Nursing, Nutrition, Pharmacy, Physician, Physical therapy,  and Clinical Coordinator. Plan of care discussed. Goal: See MD orders and progress notes for further  interventions and desired outcomes.

## 2021-08-06 NOTE — DISCHARGE SUMMARY
Admit date: 2021   Admitting Provider: Arianna Diaz MD    Discharge date: 2021  Discharging Provider: Sara Palacios MD      * Admission Diagnoses: Cardiac arrest [facility]) [I46.9]    * Discharge Diagnoses: Aortic stenosis  CAD  COPD  HFrEF  V tach arrest  Anoxic brain injury  Acute kidney injury  Acute respiratory failure with hypoxia      * Procedures: Intubation, EEG, central line placement  * No surgery found *      Consults: Cardiology, Pulmonary/Intensive care and Palliative Care      Discharge Exam:  No exam performed today, patient . * Discharge Condition:   * Disposition:     Discharge Medications:  N/A    Hospital summary  77 y/o M with PMH of aortic stenosis s/p porcine AVR in , CAD, CAD s/p CABG x4, COPD, HFrEF 15-20%, pacemaker placement 10/2020 [place] [address] after OSH V-tach arrest. ROSC achieved by EMS after shock x7, 450mg of amio, and Epi. Intubated by EMS with a 6.5 ETT. He do not show signs of neurologic recovery and underwent therapeutic hypothermia. His family then chose to transition to comfort care given his poor prognosis.   He was extubated to palliative care and  soon afterwards.      Signed:  Sara Palacios MD  2021  12:01 PM

## 2021-08-08 LAB
BACTERIA SPEC CULT: ABNORMAL
BACTERIA SPEC CULT: ABNORMAL
SERVICE CMNT-IMP: ABNORMAL

## 2023-02-10 NOTE — PROGRESS NOTES
CM acknowledged consult for cost of Eliquis. CM placed FYI to attending requesting script. 18am - reviewed with attending. Rx e-filed to [facility] on [address]    1029am - Cm called [facility] for Rx status. Cost of Eliquis will be $356 due to deductible not yet met. After that deductible should be met and medication will have a co-pay. Amount unknown. Spoke with [facility]. Cm will continue to follow.     Gurvinder Ernandez RN CM  Ext [phone] Patient tolerated procedure well. Patient tolerated procedure well.

## 2024-11-14 NOTE — ED NOTES
Patient standing up at bedside in no acute distress. Speaking in complete sentences. Patient wanting to know when he can be discharged. Team 1 paged for downgrade in care due to patient not being on drip or BIPAP anymore. No risk alerts present

## (undated) DEVICE — KIT INTRO 7FR L14CM DIA0.038IN PEEL AWAY DI-LOCK DIL CLOSE

## (undated) DEVICE — DRESSING FOAM 4X6 DISP POSTOP MEPILEX BORD AG

## (undated) DEVICE — Device: Brand: PADPRO

## (undated) DEVICE — LIMB HOLDER, WRIST/ANKLE: Brand: DEROYAL

## (undated) DEVICE — BULB SYRINGE, IRRIGATION WITH PROTECTIVE CAP, 60 CC, INDIVIDUALLY WRAPPED: Brand: DOVER

## (undated) DEVICE — PLASMABLADE PS210-030S 3.0S LOCK: Brand: PLASMABLADE™

## (undated) DEVICE — 3M™ IOBAN™ 2 ANTIMICROBIAL INCISE DRAPE 6650EZ: Brand: IOBAN™ 2

## (undated) DEVICE — CTRL URINE TOX S2E X100 --

## (undated) DEVICE — PACEMAKER PACK: Brand: MEDLINE INDUSTRIES, INC.

## (undated) DEVICE — GOWN,SIRUS,NONRNF,SETINSLV,2XL,18/CS: Brand: MEDLINE

## (undated) DEVICE — SLING ORTHOPEDIC PCH UNIV 19.5X9 IN 2-39 IN ARM W/ FOAM STRP

## (undated) DEVICE — KIT INTRO 9FR L14CM DIA0.038IN PEEL AWAY DI-LOCK DIL CLOSE

## (undated) DEVICE — DERMABOND SKIN ADH 0.7ML -- DERMABOND ADVANCED 12/BX

## (undated) DEVICE — SUTURE DEV SZ 3-0 V-LOC 90 L12IN TO L18IN CV-23 VLT VLOCM0844